# Patient Record
Sex: FEMALE | Race: WHITE | Employment: OTHER | URBAN - METROPOLITAN AREA
[De-identification: names, ages, dates, MRNs, and addresses within clinical notes are randomized per-mention and may not be internally consistent; named-entity substitution may affect disease eponyms.]

---

## 2020-04-29 ENCOUNTER — HOSPITAL ENCOUNTER (OUTPATIENT)
Age: 85
Discharge: HOME | End: 2020-04-29
Payer: MEDICARE

## 2020-04-29 PROCEDURE — U0002 COVID-19 LAB TEST NON-CDC: HCPCS

## 2020-05-02 LAB — SARS-COV-2, NAA: DETECTED

## 2020-06-19 ENCOUNTER — HOSPITAL ENCOUNTER (EMERGENCY)
Age: 85
Discharge: HOME | End: 2020-06-20
Attending: EMERGENCY MEDICINE | Admitting: SURGERY
Payer: MEDICARE

## 2020-06-19 ENCOUNTER — APPOINTMENT (OUTPATIENT)
Dept: CT IMAGING | Age: 85
End: 2020-06-19
Payer: MEDICARE

## 2020-06-19 PROCEDURE — 83874 ASSAY OF MYOGLOBIN: CPT

## 2020-06-19 PROCEDURE — 84484 ASSAY OF TROPONIN QUANT: CPT

## 2020-06-19 PROCEDURE — 93005 ELECTROCARDIOGRAM TRACING: CPT

## 2020-06-19 PROCEDURE — 80048 BASIC METABOLIC PNL TOTAL CA: CPT

## 2020-06-19 PROCEDURE — 87086 URINE CULTURE/COLONY COUNT: CPT

## 2020-06-19 PROCEDURE — 36415 COLL VENOUS BLD VENIPUNCTURE: CPT

## 2020-06-19 PROCEDURE — 12011 RPR F/E/E/N/L/M 2.5 CM/<: CPT

## 2020-06-19 PROCEDURE — 87077 CULTURE AEROBIC IDENTIFY: CPT

## 2020-06-19 PROCEDURE — 81001 URINALYSIS AUTO W/SCOPE: CPT

## 2020-06-19 PROCEDURE — 99285 EMERGENCY DEPT VISIT HI MDM: CPT

## 2020-06-19 PROCEDURE — 70450 CT HEAD/BRAIN W/O DYE: CPT

## 2020-06-19 PROCEDURE — 87186 SC STD MICRODIL/AGAR DIL: CPT

## 2020-06-19 PROCEDURE — 85025 COMPLETE CBC W/AUTO DIFF WBC: CPT

## 2020-06-19 RX ORDER — SENNA PLUS 8.6 MG/1
1 TABLET ORAL DAILY
COMMUNITY

## 2020-06-19 RX ORDER — POLYETHYLENE GLYCOL 3350 17 G/17G
17 POWDER, FOR SOLUTION ORAL DAILY
COMMUNITY

## 2020-06-19 RX ORDER — ACETAMINOPHEN 325 MG/1
650 TABLET ORAL EVERY 6 HOURS PRN
COMMUNITY

## 2020-06-19 RX ORDER — BRIMONIDINE TARTRATE, TIMOLOL MALEATE 2; 5 MG/ML; MG/ML
1 SOLUTION/ DROPS OPHTHALMIC EVERY 12 HOURS
COMMUNITY

## 2020-06-19 RX ORDER — TRAZODONE HYDROCHLORIDE 100 MG/1
100 TABLET ORAL NIGHTLY
COMMUNITY

## 2020-06-19 RX ORDER — LISINOPRIL 20 MG/1
20 TABLET ORAL DAILY
COMMUNITY

## 2020-06-19 RX ORDER — AMLODIPINE BESYLATE 5 MG/1
5 TABLET ORAL DAILY
COMMUNITY

## 2020-06-19 RX ORDER — ALBUTEROL SULFATE 2.5 MG/3ML
2.5 SOLUTION RESPIRATORY (INHALATION) 3 TIMES DAILY
COMMUNITY

## 2020-06-19 RX ORDER — ALPRAZOLAM 0.25 MG/1
0.25 TABLET ORAL 3 TIMES DAILY PRN
COMMUNITY

## 2020-06-19 RX ORDER — KETOTIFEN FUMARATE 0.35 MG/ML
1 SOLUTION/ DROPS OPHTHALMIC 2 TIMES DAILY
COMMUNITY

## 2020-06-19 RX ORDER — TRIMETHOPRIM 100 MG/1
100 TABLET ORAL DAILY
COMMUNITY

## 2020-06-19 ASSESSMENT — PAIN SCALES - GENERAL: PAINLEVEL_OUTOF10: 3

## 2020-06-19 ASSESSMENT — PAIN DESCRIPTION - PAIN TYPE: TYPE: ACUTE PAIN

## 2020-06-19 ASSESSMENT — PAIN DESCRIPTION - LOCATION: LOCATION: FACE

## 2020-06-20 ENCOUNTER — APPOINTMENT (OUTPATIENT)
Dept: GENERAL RADIOLOGY | Age: 85
End: 2020-06-20
Payer: MEDICARE

## 2020-06-20 ENCOUNTER — APPOINTMENT (OUTPATIENT)
Dept: CT IMAGING | Age: 85
End: 2020-06-20
Payer: MEDICARE

## 2020-06-20 VITALS
BODY MASS INDEX: 19.88 KG/M2 | HEART RATE: 86 BPM | WEIGHT: 105.3 LBS | HEIGHT: 61 IN | OXYGEN SATURATION: 100 % | TEMPERATURE: 97.7 F | RESPIRATION RATE: 15 BRPM | SYSTOLIC BLOOD PRESSURE: 113 MMHG | DIASTOLIC BLOOD PRESSURE: 62 MMHG

## 2020-06-20 PROBLEM — I60.9 SAH (SUBARACHNOID HEMORRHAGE) (HCC): Status: ACTIVE | Noted: 2020-06-20

## 2020-06-20 LAB
-: ABNORMAL
ABO/RH: NORMAL
ABSOLUTE EOS #: 0.1 K/UL (ref 0–0.4)
ABSOLUTE IMMATURE GRANULOCYTE: ABNORMAL K/UL (ref 0–0.3)
ABSOLUTE LYMPH #: 2.4 K/UL (ref 1–4.8)
ABSOLUTE MONO #: 0.5 K/UL (ref 0.1–1.2)
ALLEN TEST: ABNORMAL
AMORPHOUS: ABNORMAL
ANION GAP SERPL CALCULATED.3IONS-SCNC: 10 MMOL/L (ref 9–17)
ANION GAP SERPL CALCULATED.3IONS-SCNC: 17 MMOL/L (ref 9–17)
ANTIBODY SCREEN: NEGATIVE
ARM BAND NUMBER: NORMAL
BACTERIA: ABNORMAL
BASOPHILS # BLD: 1 % (ref 0–2)
BASOPHILS ABSOLUTE: 0.1 K/UL (ref 0–0.2)
BILIRUBIN URINE: NEGATIVE
BLOOD BANK SPECIMEN: ABNORMAL
BUN BLDV-MCNC: 16 MG/DL (ref 8–23)
BUN BLDV-MCNC: 20 MG/DL (ref 8–23)
BUN/CREAT BLD: ABNORMAL (ref 9–20)
CALCIUM SERPL-MCNC: 9.3 MG/DL (ref 8.6–10.4)
CARBOXYHEMOGLOBIN: 0.4 % (ref 0–5)
CASTS UA: ABNORMAL /LPF
CHLORIDE BLD-SCNC: 106 MMOL/L (ref 98–107)
CHLORIDE BLD-SCNC: 95 MMOL/L (ref 98–107)
CHP ED QC CHECK: NORMAL
CHP ED QC CHECK: YES
CO2: 21 MMOL/L (ref 20–31)
CO2: 25 MMOL/L (ref 20–31)
COLOR: YELLOW
COMMENT UA: ABNORMAL
CREAT SERPL-MCNC: 1.07 MG/DL (ref 0.5–0.9)
CREAT SERPL-MCNC: 1.23 MG/DL (ref 0.5–0.9)
CRYSTALS, UA: ABNORMAL /HPF
DIFFERENTIAL TYPE: ABNORMAL
EOSINOPHILS RELATIVE PERCENT: 2 % (ref 1–4)
EPITHELIAL CELLS UA: ABNORMAL /HPF (ref 0–5)
ETHANOL PERCENT: <0.01 %
ETHANOL: <10 MG/DL
EXPIRATION DATE: NORMAL
FIO2: ABNORMAL
GFR AFRICAN AMERICAN: 49 ML/MIN
GFR AFRICAN AMERICAN: 58 ML/MIN
GFR NON-AFRICAN AMERICAN: 41 ML/MIN
GFR NON-AFRICAN AMERICAN: 48 ML/MIN
GFR SERPL CREATININE-BSD FRML MDRD: ABNORMAL ML/MIN/{1.73_M2}
GLUCOSE BLD-MCNC: 117 MG/DL (ref 70–99)
GLUCOSE BLD-MCNC: 119 MG/DL (ref 65–105)
GLUCOSE BLD-MCNC: 22 MG/DL
GLUCOSE BLD-MCNC: 22 MG/DL (ref 65–105)
GLUCOSE BLD-MCNC: 222 MG/DL
GLUCOSE BLD-MCNC: 222 MG/DL (ref 65–105)
GLUCOSE BLD-MCNC: 427 MG/DL (ref 70–99)
GLUCOSE URINE: NEGATIVE
HCG QUALITATIVE: NEGATIVE
HCO3 VENOUS: 22.8 MMOL/L (ref 24–30)
HCT VFR BLD CALC: 31.4 % (ref 36–46)
HCT VFR BLD CALC: 33.4 % (ref 36.3–47.1)
HEMOGLOBIN: 10.6 G/DL (ref 11.9–15.1)
HEMOGLOBIN: 10.7 G/DL (ref 12–16)
IMMATURE GRANULOCYTES: ABNORMAL %
INR BLD: 1
KETONES, URINE: NEGATIVE
LEUKOCYTE ESTERASE, URINE: ABNORMAL
LYMPHOCYTES # BLD: 44 % (ref 24–44)
MCH RBC QN AUTO: 31.2 PG (ref 25.2–33.5)
MCH RBC QN AUTO: 32.4 PG (ref 26–34)
MCHC RBC AUTO-ENTMCNC: 31.7 G/DL (ref 28.4–34.8)
MCHC RBC AUTO-ENTMCNC: 34.1 G/DL (ref 31–37)
MCV RBC AUTO: 95.2 FL (ref 80–100)
MCV RBC AUTO: 98.2 FL (ref 82.6–102.9)
METHEMOGLOBIN: ABNORMAL % (ref 0–1.5)
MODE: ABNORMAL
MONOCYTES # BLD: 9 % (ref 2–11)
MUCUS: ABNORMAL
MYOGLOBIN: 140 NG/ML (ref 25–58)
NEGATIVE BASE EXCESS, VEN: 1.4 MMOL/L (ref 0–2)
NITRITE, URINE: NEGATIVE
NOTIFICATION TIME: ABNORMAL
NOTIFICATION: ABNORMAL
NRBC AUTOMATED: 0 PER 100 WBC
NRBC AUTOMATED: ABNORMAL PER 100 WBC
O2 DEVICE/FLOW/%: ABNORMAL
O2 SAT, VEN: 84.5 % (ref 60–85)
OTHER OBSERVATIONS UA: ABNORMAL
OXYHEMOGLOBIN: ABNORMAL % (ref 95–98)
PARTIAL THROMBOPLASTIN TIME: 22 SEC (ref 20.5–30.5)
PATIENT TEMP: 37
PCO2, VEN, TEMP ADJ: ABNORMAL MMHG (ref 39–55)
PCO2, VEN: 38.7 (ref 39–55)
PDW BLD-RTO: 13.5 % (ref 11.8–14.4)
PDW BLD-RTO: 14.7 % (ref 12.5–15.4)
PEEP/CPAP: ABNORMAL
PH UA: 5.5 (ref 5–8)
PH VENOUS: 7.39 (ref 7.32–7.42)
PH, VEN, TEMP ADJ: ABNORMAL (ref 7.32–7.42)
PLATELET # BLD: 267 K/UL (ref 138–453)
PLATELET # BLD: 281 K/UL (ref 140–450)
PLATELET ESTIMATE: ABNORMAL
PMV BLD AUTO: 7.3 FL (ref 6–12)
PMV BLD AUTO: 9 FL (ref 8.1–13.5)
PO2, VEN, TEMP ADJ: ABNORMAL MMHG (ref 30–50)
PO2, VEN: 51.9 (ref 30–50)
POSITIVE BASE EXCESS, VEN: ABNORMAL MMOL/L (ref 0–2)
POTASSIUM SERPL-SCNC: 4 MMOL/L (ref 3.7–5.3)
POTASSIUM SERPL-SCNC: 4.7 MMOL/L (ref 3.7–5.3)
PROTEIN UA: NEGATIVE
PROTHROMBIN TIME: 10 SEC (ref 9–12)
PSV: ABNORMAL
PT. POSITION: ABNORMAL
RBC # BLD: 3.3 M/UL (ref 4–5.2)
RBC # BLD: 3.4 M/UL (ref 3.95–5.11)
RBC # BLD: ABNORMAL 10*6/UL
RBC UA: ABNORMAL /HPF (ref 0–2)
RENAL EPITHELIAL, UA: ABNORMAL /HPF
RESPIRATORY RATE: ABNORMAL
SAMPLE SITE: ABNORMAL
SARS-COV-2, PCR: ABNORMAL
SARS-COV-2, RAPID: DETECTED
SARS-COV-2: ABNORMAL
SEG NEUTROPHILS: 44 % (ref 36–66)
SEGMENTED NEUTROPHILS ABSOLUTE COUNT: 2.4 K/UL (ref 1.8–7.7)
SET RATE: ABNORMAL
SODIUM BLD-SCNC: 133 MMOL/L (ref 135–144)
SODIUM BLD-SCNC: 141 MMOL/L (ref 135–144)
SOURCE: ABNORMAL
SPECIFIC GRAVITY UA: 1.01 (ref 1–1.03)
TEXT FOR RESPIRATORY: ABNORMAL
TOTAL HB: ABNORMAL G/DL (ref 12–16)
TOTAL RATE: ABNORMAL
TRICHOMONAS: ABNORMAL
TROPONIN INTERP: ABNORMAL
TROPONIN T: ABNORMAL NG/ML
TROPONIN, HIGH SENSITIVITY: 53 NG/L (ref 0–14)
TURBIDITY: ABNORMAL
URINE HGB: ABNORMAL
UROBILINOGEN, URINE: NORMAL
VT: ABNORMAL
WBC # BLD: 10.5 K/UL (ref 3.5–11.3)
WBC # BLD: 5.4 K/UL (ref 3.5–11)
WBC # BLD: ABNORMAL 10*3/UL
WBC UA: ABNORMAL /HPF (ref 0–5)
YEAST: ABNORMAL

## 2020-06-20 PROCEDURE — 71045 X-RAY EXAM CHEST 1 VIEW: CPT

## 2020-06-20 PROCEDURE — 6370000000 HC RX 637 (ALT 250 FOR IP): Performed by: STUDENT IN AN ORGANIZED HEALTH CARE EDUCATION/TRAINING PROGRAM

## 2020-06-20 PROCEDURE — 72170 X-RAY EXAM OF PELVIS: CPT

## 2020-06-20 PROCEDURE — 73562 X-RAY EXAM OF KNEE 3: CPT

## 2020-06-20 PROCEDURE — 70450 CT HEAD/BRAIN W/O DYE: CPT

## 2020-06-20 PROCEDURE — 96375 TX/PRO/DX INJ NEW DRUG ADDON: CPT

## 2020-06-20 PROCEDURE — 73502 X-RAY EXAM HIP UNI 2-3 VIEWS: CPT

## 2020-06-20 PROCEDURE — 86901 BLOOD TYPING SEROLOGIC RH(D): CPT

## 2020-06-20 PROCEDURE — G0480 DRUG TEST DEF 1-7 CLASSES: HCPCS

## 2020-06-20 PROCEDURE — 2580000003 HC RX 258: Performed by: EMERGENCY MEDICINE

## 2020-06-20 PROCEDURE — 85610 PROTHROMBIN TIME: CPT

## 2020-06-20 PROCEDURE — 82947 ASSAY GLUCOSE BLOOD QUANT: CPT

## 2020-06-20 PROCEDURE — 72128 CT CHEST SPINE W/O DYE: CPT

## 2020-06-20 PROCEDURE — 80051 ELECTROLYTE PANEL: CPT

## 2020-06-20 PROCEDURE — 96365 THER/PROPH/DIAG IV INF INIT: CPT

## 2020-06-20 PROCEDURE — 96372 THER/PROPH/DIAG INJ SC/IM: CPT

## 2020-06-20 PROCEDURE — 70486 CT MAXILLOFACIAL W/O DYE: CPT

## 2020-06-20 PROCEDURE — 85027 COMPLETE CBC AUTOMATED: CPT

## 2020-06-20 PROCEDURE — 99222 1ST HOSP IP/OBS MODERATE 55: CPT | Performed by: NEUROLOGICAL SURGERY

## 2020-06-20 PROCEDURE — U0002 COVID-19 LAB TEST NON-CDC: HCPCS

## 2020-06-20 PROCEDURE — 6370000000 HC RX 637 (ALT 250 FOR IP): Performed by: EMERGENCY MEDICINE

## 2020-06-20 PROCEDURE — 72125 CT NECK SPINE W/O DYE: CPT

## 2020-06-20 PROCEDURE — 86900 BLOOD TYPING SEROLOGIC ABO: CPT

## 2020-06-20 PROCEDURE — 6360000002 HC RX W HCPCS: Performed by: EMERGENCY MEDICINE

## 2020-06-20 PROCEDURE — 82805 BLOOD GASES W/O2 SATURATION: CPT

## 2020-06-20 PROCEDURE — 84703 CHORIONIC GONADOTROPIN ASSAY: CPT

## 2020-06-20 PROCEDURE — 2580000003 HC RX 258

## 2020-06-20 PROCEDURE — 72131 CT LUMBAR SPINE W/O DYE: CPT

## 2020-06-20 PROCEDURE — 2580000003 HC RX 258: Performed by: STUDENT IN AN ORGANIZED HEALTH CARE EDUCATION/TRAINING PROGRAM

## 2020-06-20 PROCEDURE — 84520 ASSAY OF UREA NITROGEN: CPT

## 2020-06-20 PROCEDURE — 85730 THROMBOPLASTIN TIME PARTIAL: CPT

## 2020-06-20 PROCEDURE — 86850 RBC ANTIBODY SCREEN: CPT

## 2020-06-20 PROCEDURE — 74176 CT ABD & PELVIS W/O CONTRAST: CPT

## 2020-06-20 PROCEDURE — 82565 ASSAY OF CREATININE: CPT

## 2020-06-20 RX ORDER — TRAZODONE HYDROCHLORIDE 100 MG/1
100 TABLET ORAL NIGHTLY
Status: DISCONTINUED | OUTPATIENT
Start: 2020-06-20 | End: 2020-06-20 | Stop reason: HOSPADM

## 2020-06-20 RX ORDER — LORAZEPAM 2 MG/ML
0.5 INJECTION INTRAMUSCULAR ONCE
Status: COMPLETED | OUTPATIENT
Start: 2020-06-20 | End: 2020-06-20

## 2020-06-20 RX ORDER — METOPROLOL TARTRATE 50 MG/1
25 TABLET, FILM COATED ORAL 2 TIMES DAILY
Status: DISCONTINUED | OUTPATIENT
Start: 2020-06-20 | End: 2020-06-20 | Stop reason: HOSPADM

## 2020-06-20 RX ORDER — ONDANSETRON 2 MG/ML
4 INJECTION INTRAMUSCULAR; INTRAVENOUS EVERY 6 HOURS PRN
Status: DISCONTINUED | OUTPATIENT
Start: 2020-06-20 | End: 2020-06-20 | Stop reason: HOSPADM

## 2020-06-20 RX ORDER — ZIPRASIDONE MESYLATE 20 MG/ML
10 INJECTION, POWDER, LYOPHILIZED, FOR SOLUTION INTRAMUSCULAR ONCE
Status: COMPLETED | OUTPATIENT
Start: 2020-06-20 | End: 2020-06-20

## 2020-06-20 RX ORDER — ACETAMINOPHEN 325 MG/1
650 TABLET ORAL EVERY 8 HOURS SCHEDULED
Status: DISCONTINUED | OUTPATIENT
Start: 2020-06-20 | End: 2020-06-20 | Stop reason: HOSPADM

## 2020-06-20 RX ORDER — LORAZEPAM 2 MG/ML
0.5 INJECTION INTRAMUSCULAR ONCE
Status: DISCONTINUED | OUTPATIENT
Start: 2020-06-20 | End: 2020-06-20

## 2020-06-20 RX ORDER — PROMETHAZINE HYDROCHLORIDE 25 MG/1
12.5 TABLET ORAL EVERY 6 HOURS PRN
Status: DISCONTINUED | OUTPATIENT
Start: 2020-06-20 | End: 2020-06-20 | Stop reason: HOSPADM

## 2020-06-20 RX ORDER — ALBUTEROL SULFATE 90 UG/1
2 AEROSOL, METERED RESPIRATORY (INHALATION) EVERY 6 HOURS
Status: DISCONTINUED | OUTPATIENT
Start: 2020-06-20 | End: 2020-06-20 | Stop reason: HOSPADM

## 2020-06-20 RX ORDER — FENTANYL CITRATE 50 UG/ML
50 INJECTION, SOLUTION INTRAMUSCULAR; INTRAVENOUS ONCE
Status: DISCONTINUED | OUTPATIENT
Start: 2020-06-20 | End: 2020-06-20 | Stop reason: HOSPADM

## 2020-06-20 RX ORDER — SODIUM CHLORIDE 0.9 % (FLUSH) 0.9 %
10 SYRINGE (ML) INJECTION EVERY 12 HOURS SCHEDULED
Status: DISCONTINUED | OUTPATIENT
Start: 2020-06-20 | End: 2020-06-20 | Stop reason: HOSPADM

## 2020-06-20 RX ORDER — POLYETHYLENE GLYCOL 3350 17 G/17G
17 POWDER, FOR SOLUTION ORAL DAILY PRN
Status: DISCONTINUED | OUTPATIENT
Start: 2020-06-20 | End: 2020-06-20 | Stop reason: HOSPADM

## 2020-06-20 RX ORDER — DEXTROSE MONOHYDRATE 25 G/50ML
50 INJECTION, SOLUTION INTRAVENOUS ONCE
Status: COMPLETED | OUTPATIENT
Start: 2020-06-20 | End: 2020-06-20

## 2020-06-20 RX ORDER — ALBUTEROL SULFATE 2.5 MG/3ML
2.5 SOLUTION RESPIRATORY (INHALATION) 3 TIMES DAILY
Status: DISCONTINUED | OUTPATIENT
Start: 2020-06-20 | End: 2020-06-20

## 2020-06-20 RX ORDER — LISINOPRIL 10 MG/1
20 TABLET ORAL DAILY
Status: DISCONTINUED | OUTPATIENT
Start: 2020-06-20 | End: 2020-06-20 | Stop reason: HOSPADM

## 2020-06-20 RX ORDER — METHOCARBAMOL 500 MG/1
750 TABLET, FILM COATED ORAL 3 TIMES DAILY
Status: DISCONTINUED | OUTPATIENT
Start: 2020-06-20 | End: 2020-06-20 | Stop reason: HOSPADM

## 2020-06-20 RX ORDER — SODIUM CHLORIDE 0.9 % (FLUSH) 0.9 %
10 SYRINGE (ML) INJECTION PRN
Status: DISCONTINUED | OUTPATIENT
Start: 2020-06-20 | End: 2020-06-20 | Stop reason: HOSPADM

## 2020-06-20 RX ORDER — LORAZEPAM 2 MG/ML
0.25 INJECTION INTRAMUSCULAR ONCE
Status: COMPLETED | OUTPATIENT
Start: 2020-06-20 | End: 2020-06-20

## 2020-06-20 RX ORDER — SODIUM CHLORIDE 9 MG/ML
INJECTION, SOLUTION INTRAVENOUS CONTINUOUS
Status: DISCONTINUED | OUTPATIENT
Start: 2020-06-20 | End: 2020-06-20 | Stop reason: HOSPADM

## 2020-06-20 RX ORDER — AMLODIPINE BESYLATE 10 MG/1
5 TABLET ORAL DAILY
Status: DISCONTINUED | OUTPATIENT
Start: 2020-06-20 | End: 2020-06-20 | Stop reason: HOSPADM

## 2020-06-20 RX ORDER — LORAZEPAM 2 MG/ML
INJECTION INTRAMUSCULAR
Status: DISCONTINUED
Start: 2020-06-20 | End: 2020-06-20 | Stop reason: HOSPADM

## 2020-06-20 RX ORDER — DEXTROSE MONOHYDRATE 25 G/50ML
INJECTION, SOLUTION INTRAVENOUS
Status: COMPLETED
Start: 2020-06-20 | End: 2020-06-20

## 2020-06-20 RX ORDER — GINSENG 100 MG
CAPSULE ORAL ONCE
Status: COMPLETED | OUTPATIENT
Start: 2020-06-20 | End: 2020-06-20

## 2020-06-20 RX ADMIN — DEXTROSE MONOHYDRATE 50 G: 25 INJECTION, SOLUTION INTRAVENOUS at 12:35

## 2020-06-20 RX ADMIN — AMLODIPINE BESYLATE 5 MG: 10 TABLET ORAL at 09:12

## 2020-06-20 RX ADMIN — LORAZEPAM 0.25 MG: 2 INJECTION INTRAMUSCULAR; INTRAVENOUS at 17:51

## 2020-06-20 RX ADMIN — LORAZEPAM 0.5 MG: 2 INJECTION INTRAMUSCULAR at 18:00

## 2020-06-20 RX ADMIN — BACITRACIN: 500 OINTMENT TOPICAL at 01:46

## 2020-06-20 RX ADMIN — INSULIN LISPRO 10 UNITS: 100 INJECTION, SOLUTION INTRAVENOUS; SUBCUTANEOUS at 10:55

## 2020-06-20 RX ADMIN — SODIUM CHLORIDE: 9 INJECTION, SOLUTION INTRAVENOUS at 09:17

## 2020-06-20 RX ADMIN — METOPROLOL TARTRATE 25 MG: 50 TABLET, FILM COATED ORAL at 09:12

## 2020-06-20 RX ADMIN — ZIPRASIDONE MESYLATE 10 MG: 20 INJECTION, POWDER, LYOPHILIZED, FOR SOLUTION INTRAMUSCULAR at 18:22

## 2020-06-20 RX ADMIN — LISINOPRIL 20 MG: 10 TABLET ORAL at 09:13

## 2020-06-20 RX ADMIN — METHOCARBAMOL TABLETS 750 MG: 500 TABLET, COATED ORAL at 10:54

## 2020-06-20 RX ADMIN — SERTRALINE 50 MG: 50 TABLET, FILM COATED ORAL at 09:12

## 2020-06-20 RX ADMIN — CEFTRIAXONE SODIUM 1 G: 1 INJECTION, POWDER, FOR SOLUTION INTRAMUSCULAR; INTRAVENOUS at 01:14

## 2020-06-20 RX ADMIN — WATER 1.2 ML: 1 INJECTION INTRAMUSCULAR; INTRAVENOUS; SUBCUTANEOUS at 18:23

## 2020-06-20 RX ADMIN — ACETAMINOPHEN 650 MG: 325 TABLET ORAL at 09:16

## 2020-06-20 ASSESSMENT — ENCOUNTER SYMPTOMS
EYE REDNESS: 0
CHEST TIGHTNESS: 0
ABDOMINAL PAIN: 0
EYE DISCHARGE: 0
FACIAL SWELLING: 0
SHORTNESS OF BREATH: 0
ABDOMINAL DISTENTION: 0

## 2020-06-20 ASSESSMENT — PAIN SCALES - GENERAL
PAINLEVEL_OUTOF10: 8
PAINLEVEL_OUTOF10: 5
PAINLEVEL_OUTOF10: 3

## 2020-06-20 ASSESSMENT — PAIN DESCRIPTION - PAIN TYPE: TYPE: ACUTE PAIN

## 2020-06-20 ASSESSMENT — PAIN DESCRIPTION - PROGRESSION: CLINICAL_PROGRESSION: GRADUALLY IMPROVING

## 2020-06-20 NOTE — ED NOTES
Report called to [facility], given to nurse Lisa Ruff RN via telephone     Dhiraj Goodwin RN  06/20/20 3721

## 2020-06-20 NOTE — CONSULTS
Department of Neurosurgery                                            Nurse Practitioner Consult Note      Reason for Consult:  fall  Requesting Physician:  Dr. Charlene Bazzi  Neurosurgeon:   [] Dr. Fabian Ortiz  [] Dr. Bernadette Puentes  [] Dr. Lisa Barton  [] Dr. Basim Son      History Obtained From:  patient, electronic medical record    CHIEF COMPLAINT:         fall    HISTORY OF PRESENT ILLNESS:       The patient is a 80 y.o. female who presents with fall at nursing home. States that she tripped and went down on her left knee and face. Currently c/o pain to face, left knee, low back. Denies any loss of consciousness. Not on AC/AP meds. Taken to [facility] ER. CT head revealed acute SAH. C-spine negative. Evaluated by trauma team on arrival.    PAST MEDICAL HISTORY :       Past Medical History:        Diagnosis Date    Alzheimer disease ([facility] [address]     Constipation     Depression     Glaucoma     Hypertension        Past Surgical History:    History reviewed. No pertinent surgical history. Social History:   Social History     Socioeconomic History    Marital status:       Spouse name: Not on file    Number of children: Not on file    Years of education: Not on file    Highest education level: Not on file   Occupational History    Not on file   Social Needs    Financial resource strain: Not on file    Food insecurity     Worry: Not on file     Inability: Not on file    Transportation needs     Medical: Not on file     Non-medical: Not on file   Tobacco Use    Smoking status: Not on file   Substance and Sexual Activity    Alcohol use: Not on file    Drug use: Not on file    Sexual activity: Not on file   Lifestyle    Physical activity     Days per week: Not on file     Minutes per session: Not on file    Stress: Not on file   Relationships    Social connections     Talks on phone: Not on file     Gets together: Not on file     Attends Hindu service: Not on file     Active member of club or organization: Not on intact. The mandibular condyles are normally situated. Degenerate changes bilateral TMJs. Greatest on the left. The nasal bones and maxillary nasal processes are intact. ORBITS: The globes appear intact. The extraocular muscles, optic nerve sheath complexes and lacrimal glands appear unremarkable. No retrobulbar hematoma or mass is seen. The orbital walls and rims are intact. SINUSES/MASTOIDS: The paranasal sinuses and mastoid air cells are well aerated. No acute fracture is seen. SOFT TISSUES: No appreciable facial soft tissue swelling is seen. Mild-to-moderate right frontal subarachnoid hemorrhage. Moderate chronic small ischemic disease. No acute traumatic injury of the facial bones. Critical results were called by Dr. Belgica Shipley to [facility] on 6/20/2020 at 00:53. Ct Cervical Spine Wo Contrast    Result Date: 6/20/2020  EXAMINATION: CT OF THE CERVICAL SPINE WITHOUT CONTRAST 6/19/2020 11:58 pm TECHNIQUE: CT of the cervical spine was performed without the administration of intravenous contrast. Multiplanar reformatted images are provided for review. Dose modulation, iterative reconstruction, and/or weight based adjustment of the mA/kV was utilized to reduce the radiation dose to as low as reasonably achievable. COMPARISON: None. HISTORY: ORDERING SYSTEM PROVIDED HISTORY: trauma TECHNOLOGIST PROVIDED HISTORY: trauma Reason for Exam: fall Acuity: Acute Type of Exam: Initial FINDINGS: BONES/ALIGNMENT: There is no acute fracture or traumatic malalignment. DEGENERATIVE CHANGES: Moderate multilevel degenerate changes most pronounced C4 through C5. SOFT TISSUES: There is no prevertebral soft tissue swelling. Minimal apical scarring versus nodularity     No acute fracture or traumatic malalignment of the cervical spine.      Ct Thoracic Spine Wo Contrast    Result Date: 6/20/2020  EXAMINATION: CT OF THE THORACIC SPINE WITHOUT CONTRAST; CT OF THE LUMBAR SPINE WITHOUT CONTRAST  6/20/2020 2:56 am:

## 2020-06-20 NOTE — FLOWSHEET NOTE
asked to call patient's son: Pham Donnelly .  spoke to Neftali Quintero. Stated he was aware his mother had been transferred to [place]. Fortino Quintero given contact information. Neftali Quintero expressed gratitude. Chaplains will remain available for spiritual and emotional support and may be paged for a specific request and or visit at any time.

## 2020-06-20 NOTE — ED NOTES
Called [facility] to set up transport. Dispatch states crew is en route to facility.  Will be here in 30 minutes or less     Olaf Bai RN  06/20/20 3278

## 2020-06-20 NOTE — ED NOTES
Patient resting on cart, nondistressed  GCS=15  Denies complaints at this time    Will continue to monitor  VSS  Continues to wait for room assignment     Call light within reach     Amanda Hector, [facility]  06/20/20 9160

## 2020-06-20 NOTE — H&P
______________________________________________________    [] Other protective devices used / worn ___________________________    HISTORY:     Edison Nuñez is a 80 y.o. female that presented to the Emergency Department following mechanical fall from standing height. Patient states that she tripped over her slipper at nursing home. Adal Gabi forward hitting her head. No loss of consciousness. Not on any anticoagulation. Suffered left frontal hematoma. Transferred to [facility] emergency department where she was found to have acute subarachnoid hemorrhage. Also suffered laceration over her chin that was repaired at [facility]. CT scan of C-spine negative for acute fracture at [facility]. Transferred for further evaluation by neurosurgery team.    No complaints of pain upon arrival.    Loss of Consciousness [x]No   []Yes Duration(min)       [] Unknown     Total Fluids Given Prior To Arrival  cc    MEDICATIONS:   []  None     []  Information not available due to exam limitations documented above  Prior to Admission medications    Medication Sig Start Date End Date Taking?  Authorizing Provider   ketotifen (ALAWAY) 0.025 % ophthalmic solution 1 drop 2 times daily   Yes Historical Provider, MD   brimonidine-timolol (COMBIGAN) 0.2-0.5 % ophthalmic solution Place 1 drop into both eyes every 12 hours   Yes Historical Provider, MD   metoprolol tartrate (LOPRESSOR) 25 MG tablet Take 25 mg by mouth 2 times daily   Yes Historical Provider, MD   polyethyl glycol-propyl glycol 0.4-0.3 % (SYSTANE) 0.4-0.3 % ophthalmic solution Place 2 drops into both eyes every 4 hours While awake   Yes Historical Provider, MD   acetaminophen (TYLENOL) 325 MG tablet Take 650 mg by mouth every 6 hours as needed for Pain   Yes Historical Provider, MD   albuterol (PROVENTIL) (2.5 MG/3ML) 0.083% nebulizer solution Take 2.5 mg by nebulization three times daily   Yes Historical Provider, MD   ALPRAZolam (XANAX) 0.25 MG tablet fall Acuity: Acute Type of Exam: Initial FINDINGS: CT HEAD: BRAIN/VENTRICLES: Mild-to-moderate right frontal subarachnoid hemorrhage. Moderate patchy periventricular subcortical white matter hypoattenuation suggestive of chronic small vessel ischemic disease. .  The gray-white differentiation is maintained without evidence of an acute infarct. There is no evidence of hydrocephalus. ORBITS: The visualized portion of the orbits demonstrate no acute abnormality. SINUSES: Mild ethmoid sinus mucosal thickening. Mastoids are clear. SOFT TISSUES/SKULL:  No acute abnormality of the visualized skull or soft tissues. CT FACIAL BONES: FACIAL BONES: The maxilla, pterygoid plates and zygomatic arches are intact. The mandible is intact. The mandibular condyles are normally situated. Degenerate changes bilateral TMJs. Greatest on the left. The nasal bones and maxillary nasal processes are intact. ORBITS: The globes appear intact. The extraocular muscles, optic nerve sheath complexes and lacrimal glands appear unremarkable. No retrobulbar hematoma or mass is seen. The orbital walls and rims are intact. SINUSES/MASTOIDS: The paranasal sinuses and mastoid air cells are well aerated. No acute fracture is seen. SOFT TISSUES: No appreciable facial soft tissue swelling is seen. Mild-to-moderate right frontal subarachnoid hemorrhage. Moderate chronic small ischemic disease. No acute traumatic injury of the facial bones. Critical results were called by Dr. Santiago Lopez to [facility] on 6/20/2020 at 00:53.      Ct Facial Bones Wo Contrast    Result Date: 6/20/2020  EXAMINATION: CT OF THE HEAD WITHOUT CONTRAST; CT OF THE FACE WITHOUT CONTRAST  6/19/2020 11:57 pm TECHNIQUE: CT of the head was performed without the administration of intravenous contrast. Dose modulation, iterative reconstruction, and/or weight based adjustment of the mA/kV was utilized to reduce the radiation dose to as low as reasonably achievable.; CT of the acute traumatic injury of the facial bones. Critical results were called by Dr. Theresa Keith to [facility] on 6/20/2020 at 00:53. Ct Cervical Spine Wo Contrast    Result Date: 6/20/2020  EXAMINATION: CT OF THE CERVICAL SPINE WITHOUT CONTRAST 6/19/2020 11:58 pm TECHNIQUE: CT of the cervical spine was performed without the administration of intravenous contrast. Multiplanar reformatted images are provided for review. Dose modulation, iterative reconstruction, and/or weight based adjustment of the mA/kV was utilized to reduce the radiation dose to as low as reasonably achievable. COMPARISON: None. HISTORY: ORDERING SYSTEM PROVIDED HISTORY: trauma TECHNOLOGIST PROVIDED HISTORY: trauma Reason for Exam: fall Acuity: Acute Type of Exam: Initial FINDINGS: BONES/ALIGNMENT: There is no acute fracture or traumatic malalignment. DEGENERATIVE CHANGES: Moderate multilevel degenerate changes most pronounced C4 through C5. SOFT TISSUES: There is no prevertebral soft tissue swelling. Minimal apical scarring versus nodularity     No acute fracture or traumatic malalignment of the cervical spine. Xr Chest Portable    Result Date: 6/20/2020  EXAMINATION: ONE XRAY VIEW OF THE CHEST 6/19/2020 11:56 pm COMPARISON: None. HISTORY: ORDERING SYSTEM PROVIDED HISTORY: trauma TECHNOLOGIST PROVIDED HISTORY: trauma Reason for Exam: fall Acuity: Acute Type of Exam: Initial FINDINGS: Chronic pulmonary changes. No consolidation, effusion, or pneumothorax. The heart is not enlarged. Degenerative changes of the skeleton. Chronic changes without focal airspace consolidation.        LABS    Labs Reviewed   CBC WITH AUTO DIFFERENTIAL - Abnormal; Notable for the following components:       Result Value    RBC 3.30 (*)     Hemoglobin 10.7 (*)     Hematocrit 31.4 (*)     All other components within normal limits   BASIC METABOLIC PANEL - Abnormal; Notable for the following components:    Glucose 117 (*)     CREATININE 1.23 (*)     GFR Non- 41 (*)     GFR  49 (*)     All other components within normal limits   TROP/MYOGLOBIN - Abnormal; Notable for the following components:    Troponin, High Sensitivity 53 (*)     Myoglobin 140 (*)     All other components within normal limits   URINE RT REFLEX TO CULTURE - Abnormal; Notable for the following components:    Turbidity UA SLIGHTLY CLOUDY (*)     Urine Hgb SMALL (*)     Leukocyte Esterase, Urine SMALL (*)     All other components within normal limits   MICROSCOPIC URINALYSIS - Abnormal; Notable for the following components:    Bacteria, UA MANY (*)     Other Observations UA Culture ordered based on defined criteria.  (*)     All other components within normal limits   CULTURE, URINE         Saud Morrow DO  6/20/20, 2:53 AM

## 2020-06-20 NOTE — ED PROVIDER NOTES
[address] [facility] ED  eMERGENCY dEPARTMENT eNCOUnter      Pt Name: Yoanna Gonzalez  MRN: [id number]  [place] 5/14/1926  Date of evaluation: 6/19/2020  Provider: Misty Olmedo       CHIEF COMPLAINT       Chief Complaint   Patient presents with    Fall     Pt fell at nursing home just prior to arrival. Pt staying at [facility] unit. Pt does not remember the fall or why she fell. She is unsure of what she hit her head or her face on     Facial Laceration     Pt has 2 cm laceration noted to the chin. Moderate amount of bleeding noted. Clean dressing placed over wound    Knee Pain     left knee         HISTORY OF PRESENT ILLNESS  (Location/Symptom, Timing/Onset, Context/Setting, Quality, Duration, Modifying Factors, Severity.)   Yoanna Gonzalez is a 80 y.o. female who presents to the emergency department patient fell while walking out of her bathroom. It was unknown if it was witnessed or not. This could be possible syncope. The patient denies any pain other than some pain in her left knee. She doesn't remember falling. She is now alert. She does stay at a unit for memory loss. Nursing Notes werereviewed. REVIEW OF SYSTEMS    (2-9 systems for level 4, 10 or more for level 5)     Review of Systems   Constitutional: Negative for chills and fatigue. HENT: Negative for facial swelling. Eyes: Negative for discharge and redness. Respiratory: Negative for chest tightness and shortness of breath. Cardiovascular: Negative for chest pain and palpitations. Gastrointestinal: Negative for abdominal distention and abdominal pain. Genitourinary: Negative for dysuria and hematuria. Musculoskeletal: Negative for gait problem, joint swelling and neck pain. Skin: Positive for wound. Negative for pallor and rash. Trauma, bruising   Neurological: Negative for speech difficulty and headaches. All other systems reviewed and are negative.      Except as noted above the remainder of the review of systems was reviewed and negative. PAST MEDICAL HISTORY   History reviewed. No pertinent past medical history. SURGICAL HISTORY     History reviewed. No pertinent surgical history. CURRENT MEDICATIONS       Previous Medications    ACETAMINOPHEN (TYLENOL) 325 MG TABLET    Take 650 mg by mouth every 6 hours as needed for Pain    ALBUTEROL (PROVENTIL) (2.5 MG/3ML) 0.083% NEBULIZER SOLUTION    Take 2.5 mg by nebulization three times daily    ALPRAZOLAM (XANAX) 0.25 MG TABLET    Take 0.25 mg by mouth 3 times daily as needed for Sleep. AMLODIPINE (NORVASC) 5 MG TABLET    Take 5 mg by mouth daily    BRIMONIDINE-TIMOLOL (COMBIGAN) 0.2-0.5 % OPHTHALMIC SOLUTION    Place 1 drop into both eyes every 12 hours    DOCUSATE (COLACE) 50 MG/5ML LIQUID    Take 50 mg by mouth daily    KETOTIFEN (ALAWAY) 0.025 % OPHTHALMIC SOLUTION    1 drop 2 times daily    LISINOPRIL (PRINIVIL;ZESTRIL) 20 MG TABLET    Take 20 mg by mouth daily    METOPROLOL TARTRATE (LOPRESSOR) 25 MG TABLET    Take 25 mg by mouth 2 times daily    POLYETHYL GLYCOL-PROPYL GLYCOL 0.4-0.3 % (SYSTANE) 0.4-0.3 % OPHTHALMIC SOLUTION    Place 2 drops into both eyes every 4 hours While awake    POLYETHYLENE GLYCOL (GLYCOLAX) 17 GM/SCOOP POWDER    Take 17 g by mouth daily    SENNA (SENOKOT) 8.6 MG TABLET    Take 1 tablet by mouth daily    SERTRALINE (ZOLOFT) 50 MG TABLET    Take 50 mg by mouth daily    TRAVOPROST, BENZALKONIUM, (TRAVATAN) 0.004 % OPHTHALMIC SOLUTION    1 drop nightly    TRAZODONE (DESYREL) 100 MG TABLET    Take 100 mg by mouth nightly    TRIMETHOPRIM (TRIMPEX) 100 MG TABLET    Take 100 mg by mouth daily       ALLERGIES     Sulfa antibiotics    FAMILY HISTORY     History reviewed. No pertinent family history. SOCIAL HISTORY       Social History     Socioeconomic History    Marital status:       Spouse name: None    Number of children: None    Years of education: None    Highest education level: None Occupational History    None   Social Needs    Financial resource strain: None    Food insecurity     Worry: None     Inability: None    Transportation needs     Medical: None     Non-medical: None   Tobacco Use    Smoking status: None   Substance and Sexual Activity    Alcohol use: None    Drug use: None    Sexual activity: None   Lifestyle    Physical activity     Days per week: None     Minutes per session: None    Stress: None   Relationships    Social connections     Talks on phone: None     Gets together: None     Attends Baptism service: None     Active member of club or organization: None     Attends meetings of clubs or organizations: None     Relationship status: None    Intimate partner violence     Fear of current or ex partner: None     Emotionally abused: None     Physically abused: None     Forced sexual activity: None   Other Topics Concern    None   Social History Narrative    None         PHYSICAL EXAM    (up to 7 for level 4, 8 or more for level 5)      ED Triage Vitals [06/19/20 2330]   BP Temp Temp Source Pulse Resp SpO2 Height Weight   (!) 194/101 97.7 °F (36.5 °C) Oral 94 16 99 % -- 105 lb 4.8 oz (47.8 kg)       Physical Exam  Vitals signs and nursing note reviewed. HENT:      Head: Normocephalic. Laceration present. Nose: Signs of injury and nasal tenderness present. Right Nostril: No septal hematoma. Left Nostril: No septal hematoma. Eyes:      Conjunctiva/sclera: Conjunctivae normal.   Neck:      Musculoskeletal: Normal range of motion and neck supple. Cardiovascular:      Rate and Rhythm: Normal rate and regular rhythm. Pulmonary:      Effort: Pulmonary effort is normal.      Breath sounds: Normal breath sounds. Abdominal:      General: There is no distension. Palpations: Abdomen is soft. Tenderness: There is no abdominal tenderness. Musculoskeletal: Normal range of motion. Left knee: She exhibits swelling.  She exhibits no disease. No acute traumatic injury of the facial bones. Critical results were called by Dr. Yury Vila to [facility] on   6/20/2020 at 00:53. ED BEDSIDE ULTRASOUND:   Performed by ED Physician - none    LABS:  Labs Reviewed   CBC WITH AUTO DIFFERENTIAL - Abnormal; Notable for the following components:       Result Value    RBC 3.30 (*)     Hemoglobin 10.7 (*)     Hematocrit 31.4 (*)     All other components within normal limits   BASIC METABOLIC PANEL - Abnormal; Notable for the following components:    Glucose 117 (*)     CREATININE 1.23 (*)     GFR Non- 41 (*)     GFR  49 (*)     All other components within normal limits   TROP/MYOGLOBIN - Abnormal; Notable for the following components:    Troponin, High Sensitivity 53 (*)     Myoglobin 140 (*)     All other components within normal limits   URINE RT REFLEX TO CULTURE - Abnormal; Notable for the following components:    Turbidity UA SLIGHTLY CLOUDY (*)     Urine Hgb SMALL (*)     Leukocyte Esterase, Urine SMALL (*)     All other components within normal limits   MICROSCOPIC URINALYSIS - Abnormal; Notable for the following components:    Bacteria, UA MANY (*)     Other Observations UA Culture ordered based on defined criteria. (*)     All other components within normal limits   CULTURE, URINE       All other labs were within normal range or not returned as of this dictation.     EMERGENCY DEPARTMENT COURSE and DIFFERENTIALDIAGNOSIS/MDM:   Vitals:    Vitals:    06/19/20 2330 06/20/20 0047 06/20/20 0122 06/20/20 0139   BP: (!) 194/101 (!) 161/112 (!) 162/72 (!) 170/74   Pulse: 94 82 94 95   Resp: 16 14 19 14   Temp: 97.7 °F (36.5 °C)      TempSrc: Oral      SpO2: 99% 95% 97% 98%   Weight: 47.8 kg (105 lb 4.8 oz)          Lac Repair  Date/Time: 6/20/2020 1:38 AM  Performed by: Danial Mejia DO  Authorized by: Danial Mejia DO     Consent:     Consent obtained:  Verbal    Consent given by: Patient  Anesthesia (see MAR for exact dosages): Anesthesia method:  Local infiltration    Local anesthetic:  Lidocaine 1% w/o epi  Laceration details:     Location:  Face    Face location:  Chin    Length (cm):  2  Repair type:     Repair type:  Simple  Exploration:     Wound exploration: wound explored through full range of motion      Contaminated: no    Treatment:     Area cleansed with:  Saline    Amount of cleaning:  Standard    Irrigation solution:  Sterile saline    Irrigation method:  Pressure wash    Visualized foreign bodies/material removed: no    Skin repair:     Repair method:  Sutures    Suture size:  4-0    Suture material:  Prolene    Suture technique:  Simple interrupted  Approximation:     Approximation:  Close  Post-procedure details:     Dressing:  Antibiotic ointment    Patient tolerance of procedure: Tolerated well, no immediate complications          RE-EVALUATION:    Pt does have a tramatic SAH. I spoke with Dr Christina Chapa for transfer to [facility]    Patient is positive for urinary tract infection. I will give her dose or Rocephin in the ER. The patient is not on any anticoagulants. She does have a elevated troponin however she has subarachnoid hemorrhage will not give aspirin. Pt is doing well. Awaiting transport. CONSULTS:  None    PROCEDURES:  None    FINAL IMPRESSION      1. Fall, initial encounter    2. SAH (subarachnoid hemorrhage) ([facility])    3. Chin laceration, initial encounter    4. Contusion of face, initial encounter          DISPOSITION/PLAN   DISPOSITION Decision To Admit 06/20/2020 01:38:15 AM      CONDITION ON DISPOSITION:  Stable     PATIENT REFERRED TO:  No follow-up provider specified. DISCHARGE MEDICATIONS:  [unfilled]    (Please note that portions of thisnote were completed with a voice recognition program.  Efforts were made to edit the dictations but occasionally words are mis-transcribed.)    Maxwell Villafana D.O., F.A.C.E.P.   Attending

## 2020-06-20 NOTE — ED NOTES
Spoke with Sandrine Fisher about patient returning to [place]. Per SW, the facility did not know the patient was covid positive, patient tested positive back in April. Pt may have to go to another facility due to being covid positive. The patient will stay in ED until she can be fitted for orthotic. [facility] transportation arranged for after 1900.      Jessica Wyatt RN  06/20/20 0695

## 2020-06-20 NOTE — ED NOTES
[facility].  Dr. Nola Mauricio on phone with ED attending at [id number] [place] Nissa RN  06/20/20 3104

## 2020-06-20 NOTE — ED NOTES
Dr. Daly Pluck in room for suturing.  6 sutures placed to chin, pt tolerates without any difficulty     Chandler Coronado RN  06/20/20 5905

## 2020-06-20 NOTE — ED PROVIDER NOTES
Day[facility]  ED  Emergency Department Encounter  EmergencyMedicine Resident     This patient was seen during the COVID-19 crisis. There were limited resources and those resources we did have had to be conserved for the sickest of patients. Pt Bong Castillo  MRN: [id number]  Birthdate 5/14/1926  Date of evaluation: 6/20/20  PCP:  Bola Batista MD    CHIEF COMPLAINT       Chief Complaint   Patient presents with    Fall     Pt fell at nursing home just prior to arrival. Pt staying at [facility] unit. Pt does not remember the fall or why she fell. She is unsure of what she hit her head or her face on     Facial Laceration     Pt has 2 cm laceration noted to the chin. Moderate amount of bleeding noted. Clean dressing placed over wound    Knee Pain     left knee       HISTORY OF PRESENT ILLNESS  (Location/Symptom, Timing/Onset, Context/Setting, Quality, Duration, Modifying Factors, Severity.)      Ronda Chen is a 80 y.o. female who presents with fall, patient was found to have left frontal subarachnoid. Patient transferred here. Patient does have DNR CC paperwork. Trauma team evaluated added on CT chest abdomen pelvis thoracic and lumbar as well as repeat CT head. Patient does have a history of positive COVID reported in April. Will discuss with trauma team regarding admission to [address] [place], [place] [address]ie [facility] unit as she has not had a negative test since then versus admission to them    [facility] 60 / SURGICAL / SOCIAL / FAMILY HISTORY      has a past medical history of Alzheimer disease ([facility] [address] Constipation, Depression, Glaucoma, and Hypertension. has no past surgical history on file. Social History     Socioeconomic History    Marital status:       Spouse name: Not on file    Number of children: Not on file    Years of education: Not on file    Highest education level: Not on file   Occupational History    Not on file   Social Needs    Financial Reason for Exam: fall Acuity: Acute Type of Exam: Initial FINDINGS: Prepatellar swelling. Joint effusion. No definitive fracture. Degenerative changes. No acute osseous abnormality but osteopenia somewhat limits evaluation. If concern is high for fracture, consider CT. Ct Head Wo Contrast    Result Date: 6/20/2020  EXAMINATION: CT OF THE HEAD WITHOUT CONTRAST  6/20/2020 2:56 am TECHNIQUE: CT of the head was performed without the administration of intravenous contrast. Dose modulation, iterative reconstruction, and/or weight based adjustment of the mA/kV was utilized to reduce the radiation dose to as low as reasonably achievable. COMPARISON: None. HISTORY: ORDERING SYSTEM PROVIDED HISTORY: fall, head contusion TECHNOLOGIST PROVIDED HISTORY: fall, head contusion Reason for Exam: fall Acuity: Acute Type of Exam: Initial FINDINGS: BRAIN/VENTRICLES: Right frontal scattered acute subarachnoid hemorrhage is present. No abnormal extra-axial fluid collection. The gray-white differentiation is maintained without evidence of an acute infarct. There is no evidence of hydrocephalus. Moderate diffuse decrease in cerebral volume is noted with corresponding prominence of the sulci and ventricles. Ill-defined hypoattenuation is noted within cerebral white matter consistent with moderate microvascular ischemic change. ORBITS: The visualized portion of the orbits demonstrate no acute abnormality. SINUSES: The visualized paranasal sinuses and mastoid air cells demonstrate no acute abnormality. SOFT TISSUES/SKULL:  No acute abnormality of the visualized skull or soft tissues. 1. Stable right frontal scattered acute subarachnoid hemorrhage. 2. Moderate cerebral white matter chronic microvascular ischemic disease. 3. Moderate diffuse cerebral atrophy.      Ct Head Wo Contrast    Result Date: 6/20/2020  EXAMINATION: CT OF THE HEAD WITHOUT CONTRAST; CT OF THE FACE WITHOUT CONTRAST  6/19/2020 11:57 pm TECHNIQUE: CT of the visualized skull or soft tissues. CT FACIAL BONES: FACIAL BONES: The maxilla, pterygoid plates and zygomatic arches are intact. The mandible is intact. The mandibular condyles are normally situated. Degenerate changes bilateral TMJs. Greatest on the left. The nasal bones and maxillary nasal processes are intact. ORBITS: The globes appear intact. The extraocular muscles, optic nerve sheath complexes and lacrimal glands appear unremarkable. No retrobulbar hematoma or mass is seen. The orbital walls and rims are intact. SINUSES/MASTOIDS: The paranasal sinuses and mastoid air cells are well aerated. No acute fracture is seen. SOFT TISSUES: No appreciable facial soft tissue swelling is seen. Mild-to-moderate right frontal subarachnoid hemorrhage. Moderate chronic small ischemic disease. No acute traumatic injury of the facial bones. Critical results were called by Dr. Jose C Chen to [facility] on 6/20/2020 at 00:53. Ct Cervical Spine Wo Contrast    Result Date: 6/20/2020  EXAMINATION: CT OF THE CERVICAL SPINE WITHOUT CONTRAST 6/19/2020 11:58 pm TECHNIQUE: CT of the cervical spine was performed without the administration of intravenous contrast. Multiplanar reformatted images are provided for review. Dose modulation, iterative reconstruction, and/or weight based adjustment of the mA/kV was utilized to reduce the radiation dose to as low as reasonably achievable. COMPARISON: None. HISTORY: ORDERING SYSTEM PROVIDED HISTORY: trauma TECHNOLOGIST PROVIDED HISTORY: trauma Reason for Exam: fall Acuity: Acute Type of Exam: Initial FINDINGS: BONES/ALIGNMENT: There is no acute fracture or traumatic malalignment. DEGENERATIVE CHANGES: Moderate multilevel degenerate changes most pronounced C4 through C5. SOFT TISSUES: There is no prevertebral soft tissue swelling. Minimal apical scarring versus nodularity     No acute fracture or traumatic malalignment of the cervical spine.      Ct Thoracic Spine Wo negative. Neurosurgery working on getting patient a brace    [KW]      ED Course User Index  [KW] Moises Arellano DO     Patient Signed out to Dr. Brian Sunshine awaiting a bed on the floor. PROCEDURES:  None    CONSULTS:  IP CONSULT TO NEUROSURGERY    CRITICAL CARE:  None    FINAL IMPRESSION      1. Fall, initial encounter    2. SAH (subarachnoid hemorrhage) (HCC)    3. Chin laceration, initial encounter    4.  Contusion of face, initial encounter          DISPOSITION / PLAN     DISPOSITION Admitted    PATIENT REFERRED TO:  Jerad Funes MD  [facility] [address] [place] [id number] [phone]            DISCHARGE MEDICATIONS:  New Prescriptions    No medications on file       Moises Arellano DO  Emergency Medicine Resident    (Please note that portions of this note were completed with a voicerecognition program.  Efforts were made to edit the dictations but occasionally words are mis-transcribed.)       Moises Arellano DO  06/20/20 5836

## 2020-06-20 NOTE — ED NOTES
[facility] to Lorenzo Carr, [facility] [place]  06/20/20 1226

## 2020-06-20 NOTE — ED PROVIDER NOTES
[facility] ED     Emergency Department     Faculty Attestation        I performed a history and physical examination of the patient and discussed management with the resident. I reviewed the residents note and agree with the documented findings and plan of care. Any areas of disagreement are noted on the chart. I was personally present for the key portions of any procedures. I have documented in the chart those procedures where I was not present during the key portions. I have reviewed the emergency nurses triage note. I agree with the chief complaint, past medical history, past surgical history, allergies, medications, social and family history as documented unless otherwise noted below. For mid-level providers such as nurse practitioners as well as physicians assistants:    I have personally seen and evaluated the patient. I find the patient's history and physical exam are consistent with NP/PA documentation. I agree with the care provided, treatment rendered, disposition, & follow-up plan. Additional findings are as noted. Vital Signs: BP (!) 185/72   Pulse 103   Temp 97.7 °F (36.5 °C) (Oral)   Resp 15   Wt 105 lb 4.8 oz (47.8 kg)   SpO2 96%   PCP:  Gloria Mathias MD    Pertinent Comments:   Patient transferred from outlying facility with mechanical fall CT shows subarachnoid hemorrhage. She is awake talking maintaining her airway.   Trauma at bedside patient's arrival        Critical Care  None          Kevin Peres MD  Attending Emergency Medicine Physician              Frantz Taylor MD  06/20/20 0798

## 2020-06-20 NOTE — ED PROVIDER NOTES
Faculty Sign-Out Attestation  Handoff taken on the following patient from prior Attending Physician: Felicita Machuca    I was available and discussed any additional care issues that arose and coordinated the management plans with the resident(s) caring for the patient during my duty period. Any areas of disagreement with residents documentation of care or procedures are noted on the chart. I was personally present for the key portions of any/all procedures during my duty period. I have documented in the chart those procedures where I was not present during the diez portions. Fall, was to be admitted, but not to be discharged with TLSO brace to be fitted at facility. Cleared by trauma and NS for discharge    Devin Bourgeois MD  Attending Physician     Devin Bourgeois MD  06/20/20 4912    Shortly before transport arrived patient became agitated. History of dementia history of sundowning, do see that she has Xanax as needed. Did give 0.25 and then 0.5 mg of Ativan IM. Patient was still combative requiring multiple staff members. Given this did not feel was appropriate to transport wanted to observe and again discussed with POA by phone. Given other injuries patient was given a small amount of fentanyl for pain control. At this time patient is resting comfortably, after resident spoke with power of , do not feel that additional work-up is warranted at this time and will arrange transport back to care facility.      Devin Bourgeois MD  06/20/20 0199

## 2020-06-20 NOTE — DISCHARGE SUMMARY
deformity  Neck: collared, no cervical tenderness  Back: no abrasion, step offs, or thoraco-lumbar tenderness  Pulmonary/Chest: CTAB, good air entry bilaterally  Cardiovascular: normal rate, regular rhythm  Abdomen: soft, non-tender, non-distended   Pelvic:pelvis stable, normal external genitalia, no perineal bruising or swelling  Extremities: tenderness over right hip, no deformity. Abrasion left knee with mild tenderness. no cyanosis, edema or gross deformity  Neurologic: moving all extremities, 5/5 strength throughout     LABS:     Recent Labs     06/19/20  2354 06/20/20  0249   WBC 5.4 10.5   HGB 10.7* 10.6*   HCT 31.4* 33.4*    267    133*   K 4.7 4.0    95*   CO2 25 21   BUN 20 16   CREATININE 1.23* 1.07*       DIAGNOSTIC TESTS:    Xr Pelvis (1-2 Views)    Result Date: 6/20/2020  EXAMINATION: ONE XRAY VIEW OF THE PELVIS 6/19/2020 11:57 pm COMPARISON: None. HISTORY: ORDERING SYSTEM PROVIDED HISTORY: trauma TECHNOLOGIST PROVIDED HISTORY: trauma Reason for Exam: fall Acuity: Acute Type of Exam: Initial FINDINGS: Demineralized osseous structures. The patient is rotated. Abnormal appearance of the right inferior pubic ramus and superior pubic ramus. No comparison exams are available. This may reflect sequela of acute or remote trauma. Degenerative changes of bilateral hips. Abnormal appearance of the right hemipelvis of uncertain acuity. Consider CT to exclude acute fracture. Xr Hip Right (2-3 Views)    Result Date: 6/20/2020  EXAMINATION: TWO XRAY VIEWS OF THE RIGHT HIP 6/20/2020 3:30 am COMPARISON: None. HISTORY: ORDERING SYSTEM PROVIDED HISTORY: pain, fall TECHNOLOGIST PROVIDED HISTORY: pain, fall Reason for Exam: trauma/ fall/ pain Acuity: Acute Type of Exam: Initial FINDINGS: Mild bilateral hip joint space narrowing is seen with mild osteophytosis present. No evidence of acute fracture, dislocation or subluxation is identified.  Inferior and superior right pubic ramus remote fracture trauma is evident. Bone mineralization is within normal limits. Sacroiliac joints are unremarkable. Surrounding soft tissues are normal in appearance. 1. No evidence of acute right hip trauma. 2. Inferior and superior right pubic ramus remote fracture. 3. Mild bilateral hip joint degenerative osteoarthritis. Xr Knee Left (3 Views)    Result Date: 6/20/2020  EXAMINATION: THREE XRAY VIEWS OF THE LEFT KNEE 6/20/2020 3:30 am COMPARISON: None. HISTORY: ORDERING SYSTEM PROVIDED HISTORY: trauma, pain TECHNOLOGIST PROVIDED HISTORY: trauma, pain Reason for Exam: trauma/ fall/ pain Acuity: Acute Type of Exam: Initial FINDINGS: Demineralized osseous structures. Degenerative changes of the knee. Mild prepatellar swelling. A radiodensity associated with the suprapatellar soft tissues of uncertain origin. Please correlate with any penetrating trauma. No acute osseous abnormality. Xr Knee Left (3 Views)    Result Date: 6/20/2020  EXAMINATION: THREE XRAY VIEWS OF THE LEFT KNEE 6/20/2020 12:49 am COMPARISON: None. HISTORY: ORDERING SYSTEM PROVIDED HISTORY: Pain TECHNOLOGIST PROVIDED HISTORY: Pain Reason for Exam: fall Acuity: Acute Type of Exam: Initial FINDINGS: Prepatellar swelling. Joint effusion. No definitive fracture. Degenerative changes. No acute osseous abnormality but osteopenia somewhat limits evaluation. If concern is high for fracture, consider CT. Ct Head Wo Contrast    Result Date: 6/20/2020  EXAMINATION: CT OF THE HEAD WITHOUT CONTRAST  6/20/2020 2:56 am TECHNIQUE: CT of the head was performed without the administration of intravenous contrast. Dose modulation, iterative reconstruction, and/or weight based adjustment of the mA/kV was utilized to reduce the radiation dose to as low as reasonably achievable. COMPARISON: None.  HISTORY: ORDERING SYSTEM PROVIDED HISTORY: fall, head contusion TECHNOLOGIST PROVIDED HISTORY: fall, head contusion Reason for Exam: fall Acuity: Acute Type of Exam: Initial FINDINGS: BRAIN/VENTRICLES: Right frontal scattered acute subarachnoid hemorrhage is present. No abnormal extra-axial fluid collection. The gray-white differentiation is maintained without evidence of an acute infarct. There is no evidence of hydrocephalus. Moderate diffuse decrease in cerebral volume is noted with corresponding prominence of the sulci and ventricles. Ill-defined hypoattenuation is noted within cerebral white matter consistent with moderate microvascular ischemic change. ORBITS: The visualized portion of the orbits demonstrate no acute abnormality. SINUSES: The visualized paranasal sinuses and mastoid air cells demonstrate no acute abnormality. SOFT TISSUES/SKULL:  No acute abnormality of the visualized skull or soft tissues. 1. Stable right frontal scattered acute subarachnoid hemorrhage. 2. Moderate cerebral white matter chronic microvascular ischemic disease. 3. Moderate diffuse cerebral atrophy. Ct Head Wo Contrast    Result Date: 6/20/2020  EXAMINATION: CT OF THE HEAD WITHOUT CONTRAST; CT OF THE FACE WITHOUT CONTRAST  6/19/2020 11:57 pm TECHNIQUE: CT of the head was performed without the administration of intravenous contrast. Dose modulation, iterative reconstruction, and/or weight based adjustment of the mA/kV was utilized to reduce the radiation dose to as low as reasonably achievable.; CT of the face was performed without the administration of intravenous contrast. Multiplanar reformatted images are provided for review. Dose modulation, iterative reconstruction, and/or weight based adjustment of the mA/kV was utilized to reduce the radiation dose to as low as reasonably achievable.  COMPARISON: 02/04/2008 HISTORY: ORDERING SYSTEM PROVIDED HISTORY: trauma TECHNOLOGIST PROVIDED HISTORY: trauma Reason for Exam: fall Acuity: Acute Type of Exam: Initial; ORDERING SYSTEM PROVIDED HISTORY: Trauma TECHNOLOGIST PROVIDED HISTORY: Trauma Reason for Exam: fall Acuity: Acute Type of Exam: Initial FINDINGS: CT HEAD: BRAIN/VENTRICLES: Mild-to-moderate right frontal subarachnoid hemorrhage. Moderate patchy periventricular subcortical white matter hypoattenuation suggestive of chronic small vessel ischemic disease. .  The gray-white differentiation is maintained without evidence of an acute infarct. There is no evidence of hydrocephalus. ORBITS: The visualized portion of the orbits demonstrate no acute abnormality. SINUSES: Mild ethmoid sinus mucosal thickening. Mastoids are clear. SOFT TISSUES/SKULL:  No acute abnormality of the visualized skull or soft tissues. CT FACIAL BONES: FACIAL BONES: The maxilla, pterygoid plates and zygomatic arches are intact. The mandible is intact. The mandibular condyles are normally situated. Degenerate changes bilateral TMJs. Greatest on the left. The nasal bones and maxillary nasal processes are intact. ORBITS: The globes appear intact. The extraocular muscles, optic nerve sheath complexes and lacrimal glands appear unremarkable. No retrobulbar hematoma or mass is seen. The orbital walls and rims are intact. SINUSES/MASTOIDS: The paranasal sinuses and mastoid air cells are well aerated. No acute fracture is seen. SOFT TISSUES: No appreciable facial soft tissue swelling is seen. Mild-to-moderate right frontal subarachnoid hemorrhage. Moderate chronic small ischemic disease. No acute traumatic injury of the facial bones. Critical results were called by Dr. Maribel Kamara to [facility] on 6/20/2020 at 00:53.      Ct Facial Bones Wo Contrast    Result Date: 6/20/2020  EXAMINATION: CT OF THE HEAD WITHOUT CONTRAST; CT OF THE FACE WITHOUT CONTRAST  6/19/2020 11:57 pm TECHNIQUE: CT of the head was performed without the administration of intravenous contrast. Dose modulation, iterative reconstruction, and/or weight based adjustment of the mA/kV was utilized to reduce the radiation dose to as low as reasonably achievable.; CT of the face was traumatic injury of the facial bones. Critical results were called by Dr. Marii De La Rosa to [facility] on 6/20/2020 at 00:53. Ct Cervical Spine Wo Contrast    Result Date: 6/20/2020  EXAMINATION: CT OF THE CERVICAL SPINE WITHOUT CONTRAST 6/19/2020 11:58 pm TECHNIQUE: CT of the cervical spine was performed without the administration of intravenous contrast. Multiplanar reformatted images are provided for review. Dose modulation, iterative reconstruction, and/or weight based adjustment of the mA/kV was utilized to reduce the radiation dose to as low as reasonably achievable. COMPARISON: None. HISTORY: ORDERING SYSTEM PROVIDED HISTORY: trauma TECHNOLOGIST PROVIDED HISTORY: trauma Reason for Exam: fall Acuity: Acute Type of Exam: Initial FINDINGS: BONES/ALIGNMENT: There is no acute fracture or traumatic malalignment. DEGENERATIVE CHANGES: Moderate multilevel degenerate changes most pronounced C4 through C5. SOFT TISSUES: There is no prevertebral soft tissue swelling. Minimal apical scarring versus nodularity     No acute fracture or traumatic malalignment of the cervical spine. Ct Thoracic Spine Wo Contrast    Result Date: 6/20/2020  EXAMINATION: CT OF THE THORACIC SPINE WITHOUT CONTRAST; CT OF THE LUMBAR SPINE WITHOUT CONTRAST  6/20/2020 2:56 am: TECHNIQUE: CT of the thoracic spine was performed without the administration of intravenous contrast. Multiplanar reformatted images are provided for review. Dose modulation, iterative reconstruction, and/or weight based adjustment of the mA/kV was utilized to reduce the radiation dose to as low as reasonably achievable.; CT of the lumbar spine was performed without the administration of intravenous contrast. Multiplanar reformatted images are provided for review. Dose modulation, iterative reconstruction, and/or weight based adjustment of the mA/kV was utilized to reduce the radiation dose to as low as reasonably achievable. COMPARISON: None.  HISTORY: ORDERING reconstruction, and/or weight based adjustment of the mA/kV was utilized to reduce the radiation dose to as low as reasonably achievable. COMPARISON: Chest portable June 20, 2020 at 0057 hours HISTORY: ORDERING SYSTEM PROVIDED HISTORY: fall, pain TECHNOLOGIST PROVIDED HISTORY: fall, pain Reason for Exam: fall Acuity: Acute Type of Exam: Initial FINDINGS: Chest: Mediastinum: The heart is normal in size and configuration. No evidence of pericardial effusion is seen. No evidence of mediastinal or hilar lymphadenopathy or mass lesion is identified. Lungs/pleura: The lungs are well aerated without evidence of consolidation. The pleural surfaces are unremarkable without evidence of pleural thickening or pleural effusion identified. Soft Tissues/Bones: The bones, skeletal muscle bundles, fascial planes and subcutaneous soft tissues are unremarkable in appearance. Abdomen/Pelvis: Organs: GI/Bowel: Numerous diverticular seen involving the sigmoid colon without evidence of adjacent inflammatory change within the mesenteric fat. The stomach is unremarkable without wall thickening or distention. Bowel loops are otherwise unremarkable in appearance without evidence of obstruction, distension or mucosal thickening. Pelvis: Peritoneum/Retroperitoneum: Bones/Soft Tissues: Right-sided spigelian hernia is seen at the level of the right iliac crest with defect within the abdominal wall measuring up to 15 mm in transverse dimension. Inferior right pubic ramus remote healed fracture is present. L4/L5 and L5/S1 severe bilateral facet degenerative hypertrophy is present. Mild depression of the superior endplate at L5 is seen with cortical discontinuity consistent with acute compression fracture which results in approximately 25% height loss. The bones, skeletal muscle bundles, fascial planes and subcutaneous soft tissues are otherwise unremarkable in appearance.      1. L5 vertebral body acute compression fracture with concavity of

## 2020-06-20 NOTE — ED NOTES
ER Resident speaking with patients son via telephone at this time  Patient to stay in ER until further details are available    Pt remains stable.  nondistressed       Yvonne Julien RN  06/20/20 3002

## 2020-06-20 NOTE — ED PROVIDER NOTES
[address] ED  Emergency Department  Emergency Medicine Resident Sign-out     Care of Laura Jama was assumed from Dr. Brandon Francois and is being seen for Fall (Pt fell at nursing home just prior to arrival. Pt staying at [facility] unit. Pt does not remember the fall or why she fell. She is unsure of what she hit her head or her face on ); Facial Laceration (Pt has 2 cm laceration noted to the chin. Moderate amount of bleeding noted. Clean dressing placed over wound); and Knee Pain (left knee)  . The patient's initial evaluation and plan have been discussed with the prior provider who initially evaluated the patient.      EMERGENCY DEPARTMENT COURSE / MEDICAL DECISION MAKING:       MEDICATIONS GIVEN:  Orders Placed This Encounter   Medications    cefTRIAXone (ROCEPHIN) 1 g IVPB in 50 mL D5W minibag    bacitracin ointment    DISCONTD: albuterol (PROVENTIL) nebulizer solution 2.5 mg    amLODIPine (NORVASC) tablet 5 mg    lisinopril (PRINIVIL;ZESTRIL) tablet 20 mg    metoprolol tartrate (LOPRESSOR) tablet 25 mg    sertraline (ZOLOFT) tablet 50 mg    traZODone (DESYREL) tablet 100 mg    methocarbamol (ROBAXIN) tablet 750 mg    sodium chloride flush 0.9 % injection 10 mL    sodium chloride flush 0.9 % injection 10 mL    acetaminophen (TYLENOL) tablet 650 mg    OR Linked Order Group     promethazine (PHENERGAN) tablet 12.5 mg     ondansetron (ZOFRAN) injection 4 mg    polyethylene glycol (GLYCOLAX) packet 17 g    0.9 % sodium chloride infusion    albuterol sulfate  (90 Base) MCG/ACT inhaler 2 puff    insulin lispro (HUMALOG) injection vial 10 Units    dextrose 50 % solution     NATALIE TOURE: cabinet override    dextrose 50 % IV solution       LABS / RADIOLOGY:     Labs Reviewed   CBC WITH AUTO DIFFERENTIAL - Abnormal; Notable for the following components:       Result Value    RBC 3.30 (*)     Hemoglobin 10.7 (*)     Hematocrit 31.4 (*)     All other maxillary nasal processes are intact. ORBITS: The globes appear intact. The extraocular muscles, optic nerve sheath complexes and lacrimal glands appear unremarkable. No retrobulbar hematoma or mass is seen. The orbital walls and rims are intact. SINUSES/MASTOIDS: The paranasal sinuses and mastoid air cells are well aerated. No acute fracture is seen. SOFT TISSUES: No appreciable facial soft tissue swelling is seen. Mild-to-moderate right frontal subarachnoid hemorrhage. Moderate chronic small ischemic disease. No acute traumatic injury of the facial bones. Critical results were called by Dr. Brent Mchugh to [facility] on 6/20/2020 at 00:53. Ct Facial Bones Wo Contrast    Result Date: 6/20/2020  EXAMINATION: CT OF THE HEAD WITHOUT CONTRAST; CT OF THE FACE WITHOUT CONTRAST  6/19/2020 11:57 pm TECHNIQUE: CT of the head was performed without the administration of intravenous contrast. Dose modulation, iterative reconstruction, and/or weight based adjustment of the mA/kV was utilized to reduce the radiation dose to as low as reasonably achievable.; CT of the face was performed without the administration of intravenous contrast. Multiplanar reformatted images are provided for review. Dose modulation, iterative reconstruction, and/or weight based adjustment of the mA/kV was utilized to reduce the radiation dose to as low as reasonably achievable. COMPARISON: 02/04/2008 HISTORY: ORDERING SYSTEM PROVIDED HISTORY: trauma TECHNOLOGIST PROVIDED HISTORY: trauma Reason for Exam: fall Acuity: Acute Type of Exam: Initial; ORDERING SYSTEM PROVIDED HISTORY: Trauma TECHNOLOGIST PROVIDED HISTORY: Trauma Reason for Exam: fall Acuity: Acute Type of Exam: Initial FINDINGS: CT HEAD: BRAIN/VENTRICLES: Mild-to-moderate right frontal subarachnoid hemorrhage. Moderate patchy periventricular subcortical white matter hypoattenuation suggestive of chronic small vessel ischemic disease. .  The gray-white differentiation is spine. SOFT TISSUES: No paraspinal mass is seen. 1. L5 vertebral body acute compression fracture with concavity of the superior endplate resulting approximately 25% height loss. 2. Remote vertebral body compression fractures at levels T7 and L1, as discussed above. Xr Chest Portable    Result Date: 6/20/2020  EXAMINATION: ONE XRAY VIEW OF THE CHEST 6/19/2020 11:56 pm COMPARISON: None. HISTORY: ORDERING SYSTEM PROVIDED HISTORY: trauma TECHNOLOGIST PROVIDED HISTORY: trauma Reason for Exam: fall Acuity: Acute Type of Exam: Initial FINDINGS: Chronic pulmonary changes. No consolidation, effusion, or pneumothorax. The heart is not enlarged. Degenerative changes of the skeleton. Chronic changes without focal airspace consolidation. Ct Chest Abdomen Pelvis Wo Contrast    Result Date: 6/20/2020  EXAMINATION: CT OF THE CHEST, ABDOMEN, AND PELVIS WITHOUT CONTRAST 6/20/2020 2:55 am TECHNIQUE: CT of the chest, abdomen and pelvis was performed without the administration of intravenous contrast. Multiplanar reformatted images are provided for review. Dose modulation, iterative reconstruction, and/or weight based adjustment of the mA/kV was utilized to reduce the radiation dose to as low as reasonably achievable. COMPARISON: Chest portable June 20, 2020 at 0057 hours HISTORY: ORDERING SYSTEM PROVIDED HISTORY: fall, pain TECHNOLOGIST PROVIDED HISTORY: fall, pain Reason for Exam: fall Acuity: Acute Type of Exam: Initial FINDINGS: Chest: Mediastinum: The heart is normal in size and configuration. No evidence of pericardial effusion is seen. No evidence of mediastinal or hilar lymphadenopathy or mass lesion is identified. Lungs/pleura: The lungs are well aerated without evidence of consolidation. The pleural surfaces are unremarkable without evidence of pleural thickening or pleural effusion identified. Soft Tissues/Bones:  The bones, skeletal muscle bundles, fascial planes and subcutaneous soft tissues are

## 2020-06-20 NOTE — PROGRESS NOTES
right pubic ramus remote fracture. 3. Mild bilateral hip joint degenerative osteoarthritis. CT THORACIC SPINE WO CONTRAST   Final Result   1. L5 vertebral body acute compression fracture with concavity of the   superior endplate resulting approximately 25% height loss. 2. Remote vertebral body compression fractures at levels T7 and L1, as   discussed above. CT LUMBAR SPINE WO CONTRAST   Final Result   1. L5 vertebral body acute compression fracture with concavity of the   superior endplate resulting approximately 25% height loss. 2. Remote vertebral body compression fractures at levels T7 and L1, as   discussed above. CT CHEST ABDOMEN PELVIS WO CONTRAST   Final Result   1. L5 vertebral body acute compression fracture with concavity of the   superior endplate resulting approximately 25% height loss. 2. Otherwise, no further evidence of acute trauma involving the chest,   abdomen or pelvis. 3. Inferior right pubic ramus healed remote fracture. 4. Sigmoid colon diverticulosis without evidence of diverticulitis. 5. Right-sided spigelian hernia without evidence of bowel involvement. 6. L4/L5 and L5/S1 severe bilateral facet degenerative arthrosis. 7. Unremarkable noncontrast CT chest examination. CT HEAD WO CONTRAST   Final Result   1. Stable right frontal scattered acute subarachnoid hemorrhage. 2. Moderate cerebral white matter chronic microvascular ischemic disease. 3. Moderate diffuse cerebral atrophy. XR CHEST PORTABLE   Final Result   Chronic changes without focal airspace consolidation. XR PELVIS (1-2 VIEWS)   Final Result   Abnormal appearance of the right hemipelvis of uncertain acuity. Consider CT   to exclude acute fracture. XR KNEE LEFT (3 VIEWS)   Final Result   No acute osseous abnormality but osteopenia somewhat limits evaluation. If   concern is high for fracture, consider CT.          CT Head WO Contrast   Final Result Mild-to-moderate right frontal subarachnoid hemorrhage. Moderate chronic small ischemic disease. No acute traumatic injury of the facial bones. Critical results were called by Dr. Pramod Muhammad to [facility] on   6/20/2020 at 00:53. CT Cervical Spine WO Contrast   Final Result   No acute fracture or traumatic malalignment of the cervical spine. CT Facial Bones WO Contrast   Final Result   Mild-to-moderate right frontal subarachnoid hemorrhage. Moderate chronic small ischemic disease. No acute traumatic injury of the facial bones. Critical results were called by Dr. Pramod Muhammad to [facility] on   6/20/2020 at 00:53. PHYSICAL EXAM:   GCS:  4 - Opens eyes on own   6 - Follows simple motor commands  5 - Alert and oriented    Pupil size:  Deffered d/t COVID   Pupil reaction: Deffered d/t COVID   Wiggles fingers: Left Yes Right Yes  Hand grasp:  Deffered d/t COVID   Wiggles toes: Left Yes    Right Yes  Plantar flexion: Deffered d/t COVID     Spine:     Deffered d/t COVID, Pt states she did get to sit on edge of bed    Musculoskeletal    Deffered d/t COVID       CONSULTS: NS    PROCEDURES:     INJURIES:        Patient Active Problem List   Diagnosis    SAH (subarachnoid hemorrhage) ([facility])       Assessment/Plan:     L knee Xray reviewed; no acute fracture, Prepatellar swelling  No further concerns  Await neurosurgery attending exam and recommendations.

## 2020-06-20 NOTE — PROGRESS NOTES
Speech Language Pathology  Missed Treatment Note  Date: 20  Patient Name: Lesia Devries       Room:   MRN: [id number]   Account #: [de-identified]    : 1926  (80 y.o.)  Gender: female        Following the Recommendations of the Center for Disease Control (CDC) and the [place] [address] for Coronavirus Disease that:       Only essential personnel should enter the room of a patient of known or suspected Coronavirus. The number of staff who enter the room should be minimized.   It is recommended that activities be \"bundled\" to limit number of times the room is entered.   Facilities should care for these patients with dedicated health care personnel (STAFF) to minimize risk of transmission and exposure to other patients and STAFF. Should patient fail a nursing swallow screen and need a formal swallow evaluation completed by Speech Therapy, ST recommendations are as follows:     Due to excessive coughing as a symptom of this disease, inability to rule out silent aspiration at bedside and prolonged intubation, ST would be unable to make diet recommendation based on the result of bedside swallow evaluation. The patient would need a videoflouroscopic swallow evaluation to make diet recommendations about aspiration precautions. Due to restrictions on patient movement within the facility to restrict disease transmission to staff and others in the hospital, this patient would be unable leave their room to have videoflouroscopic swallow assessment. ST would assess patient's swallow when they cleared as negative for Coronavirus Isolation and are able to travel to radiology to have a swallow study completed.          Mita Brito M.S. [facility]

## 2020-06-20 NOTE — ED NOTES
[facility] present for transport  Patient stable  Nondistressed       [facility]oraP[facility]  06/20/20 0088

## 2020-06-20 NOTE — ED NOTES
Pt placed in droplet precautions. Pts brief changed, pt skin cleansed, barrier wipes used. Pt had liquid BM on bed peterson.        Chen Ji RN  06/20/20 [id number]

## 2020-06-20 NOTE — ED PROVIDER NOTES
[facility] ED  Emergency Department  Faculty Sign-Out Addendum     Care of Stephanie Nelson was assumed from previous attending and is being seen for Fall (Pt fell at nursing home just prior to arrival. Pt staying at [facility] unit. Pt does not remember the fall or why she fell. She is unsure of what she hit her head or her face on ); Facial Laceration (Pt has 2 cm laceration noted to the chin. Moderate amount of bleeding noted. Clean dressing placed over wound); and Knee Pain (left knee)  . The patient's initial evaluation and plan have been discussed with the prior provider who initially evaluated the patient. EMERGENCY DEPARTMENT COURSE / MEDICAL DECISION MAKING:       MEDICATIONS GIVEN:  Orders Placed This Encounter   Medications    cefTRIAXone (ROCEPHIN) 1 g IVPB in 50 mL D5W minibag    bacitracin ointment       LABS / RADIOLOGY:     Labs Reviewed   CBC WITH AUTO DIFFERENTIAL - Abnormal; Notable for the following components:       Result Value    RBC 3.30 (*)     Hemoglobin 10.7 (*)     Hematocrit 31.4 (*)     All other components within normal limits   BASIC METABOLIC PANEL - Abnormal; Notable for the following components:    Glucose 117 (*)     CREATININE 1.23 (*)     GFR Non- 41 (*)     GFR  49 (*)     All other components within normal limits   TROP/MYOGLOBIN - Abnormal; Notable for the following components:    Troponin, High Sensitivity 53 (*)     Myoglobin 140 (*)     All other components within normal limits   URINE RT REFLEX TO CULTURE - Abnormal; Notable for the following components:    Turbidity UA SLIGHTLY CLOUDY (*)     Urine Hgb SMALL (*)     Leukocyte Esterase, Urine SMALL (*)     All other components within normal limits   MICROSCOPIC URINALYSIS - Abnormal; Notable for the following components:    Bacteria, UA MANY (*)     Other Observations UA Culture ordered based on defined criteria.  (*)     All other components osteoarthritis. Xr Knee Left (3 Views)    Result Date: 6/20/2020  EXAMINATION: THREE XRAY VIEWS OF THE LEFT KNEE 6/20/2020 3:30 am COMPARISON: None. HISTORY: ORDERING SYSTEM PROVIDED HISTORY: trauma, pain TECHNOLOGIST PROVIDED HISTORY: trauma, pain Reason for Exam: trauma/ fall/ pain Acuity: Acute Type of Exam: Initial FINDINGS: Demineralized osseous structures. Degenerative changes of the knee. Mild prepatellar swelling. A radiodensity associated with the suprapatellar soft tissues of uncertain origin. Please correlate with any penetrating trauma. No acute osseous abnormality. Xr Knee Left (3 Views)    Result Date: 6/20/2020  EXAMINATION: THREE XRAY VIEWS OF THE LEFT KNEE 6/20/2020 12:49 am COMPARISON: None. HISTORY: ORDERING SYSTEM PROVIDED HISTORY: Pain TECHNOLOGIST PROVIDED HISTORY: Pain Reason for Exam: fall Acuity: Acute Type of Exam: Initial FINDINGS: Prepatellar swelling. Joint effusion. No definitive fracture. Degenerative changes. No acute osseous abnormality but osteopenia somewhat limits evaluation. If concern is high for fracture, consider CT. Ct Head Wo Contrast    Result Date: 6/20/2020  EXAMINATION: CT OF THE HEAD WITHOUT CONTRAST  6/20/2020 2:56 am TECHNIQUE: CT of the head was performed without the administration of intravenous contrast. Dose modulation, iterative reconstruction, and/or weight based adjustment of the mA/kV was utilized to reduce the radiation dose to as low as reasonably achievable. COMPARISON: None. HISTORY: ORDERING SYSTEM PROVIDED HISTORY: fall, head contusion TECHNOLOGIST PROVIDED HISTORY: fall, head contusion Reason for Exam: fall Acuity: Acute Type of Exam: Initial FINDINGS: BRAIN/VENTRICLES: Right frontal scattered acute subarachnoid hemorrhage is present. No abnormal extra-axial fluid collection. The gray-white differentiation is maintained without evidence of an acute infarct. There is no evidence of hydrocephalus.  Moderate diffuse decrease of an acute infarct. There is no evidence of hydrocephalus. ORBITS: The visualized portion of the orbits demonstrate no acute abnormality. SINUSES: Mild ethmoid sinus mucosal thickening. Mastoids are clear. SOFT TISSUES/SKULL:  No acute abnormality of the visualized skull or soft tissues. CT FACIAL BONES: FACIAL BONES: The maxilla, pterygoid plates and zygomatic arches are intact. The mandible is intact. The mandibular condyles are normally situated. Degenerate changes bilateral TMJs. Greatest on the left. The nasal bones and maxillary nasal processes are intact. ORBITS: The globes appear intact. The extraocular muscles, optic nerve sheath complexes and lacrimal glands appear unremarkable. No retrobulbar hematoma or mass is seen. The orbital walls and rims are intact. SINUSES/MASTOIDS: The paranasal sinuses and mastoid air cells are well aerated. No acute fracture is seen. SOFT TISSUES: No appreciable facial soft tissue swelling is seen. Mild-to-moderate right frontal subarachnoid hemorrhage. Moderate chronic small ischemic disease. No acute traumatic injury of the facial bones. Critical results were called by Dr. Jose C Chen to [facility] on 6/20/2020 at 00:53. Ct Facial Bones Wo Contrast    Result Date: 6/20/2020  EXAMINATION: CT OF THE HEAD WITHOUT CONTRAST; CT OF THE FACE WITHOUT CONTRAST  6/19/2020 11:57 pm TECHNIQUE: CT of the head was performed without the administration of intravenous contrast. Dose modulation, iterative reconstruction, and/or weight based adjustment of the mA/kV was utilized to reduce the radiation dose to as low as reasonably achievable.; CT of the face was performed without the administration of intravenous contrast. Multiplanar reformatted images are provided for review. Dose modulation, iterative reconstruction, and/or weight based adjustment of the mA/kV was utilized to reduce the radiation dose to as low as reasonably achievable.  COMPARISON: 02/04/2008 HISTORY: is normal alignment of the spine. T7 vertebral body increased can cavity of the superior endplate is noted with approximately 30% height loss noted. L1 vertebral body 90% height loss is identified. Increased can cavity of the superior L5 endplate is seen with cortical discontinuity visualized consistent with acute compression fracture resulting approximately 25% height loss. No osseous destructive lesion is seen. DEGENERATIVE CHANGES: No gross spinal canal stenosis or bony neural foraminal narrowing of the thoracic spine. SOFT TISSUES: No paraspinal mass is seen. 1. L5 vertebral body acute compression fracture with concavity of the superior endplate resulting approximately 25% height loss. 2. Remote vertebral body compression fractures at levels T7 and L1, as discussed above. Ct Lumbar Spine Wo Contrast    Result Date: 6/20/2020  EXAMINATION: CT OF THE THORACIC SPINE WITHOUT CONTRAST; CT OF THE LUMBAR SPINE WITHOUT CONTRAST  6/20/2020 2:56 am: TECHNIQUE: CT of the thoracic spine was performed without the administration of intravenous contrast. Multiplanar reformatted images are provided for review. Dose modulation, iterative reconstruction, and/or weight based adjustment of the mA/kV was utilized to reduce the radiation dose to as low as reasonably achievable.; CT of the lumbar spine was performed without the administration of intravenous contrast. Multiplanar reformatted images are provided for review. Dose modulation, iterative reconstruction, and/or weight based adjustment of the mA/kV was utilized to reduce the radiation dose to as low as reasonably achievable. COMPARISON: None.  HISTORY: ORDERING SYSTEM PROVIDED HISTORY: trauma, fall TECHNOLOGIST PROVIDED HISTORY: trauma, fall Reason for Exam: fall Acuity: Acute Type of Exam: Initial; ORDERING SYSTEM PROVIDED HISTORY: TRAUMA TECHNOLOGIST PROVIDED HISTORY: fall, trauma Reason for Exam: fall Acuity: Acute Type of Exam: Initial FINDINGS: BONES/ALIGNMENT: There is normal alignment of the spine. T7 vertebral body increased can cavity of the superior endplate is noted with approximately 30% height loss noted. L1 vertebral body 90% height loss is identified. Increased can cavity of the superior L5 endplate is seen with cortical discontinuity visualized consistent with acute compression fracture resulting approximately 25% height loss. No osseous destructive lesion is seen. DEGENERATIVE CHANGES: No gross spinal canal stenosis or bony neural foraminal narrowing of the thoracic spine. SOFT TISSUES: No paraspinal mass is seen. 1. L5 vertebral body acute compression fracture with concavity of the superior endplate resulting approximately 25% height loss. 2. Remote vertebral body compression fractures at levels T7 and L1, as discussed above. Xr Chest Portable    Result Date: 6/20/2020  EXAMINATION: ONE XRAY VIEW OF THE CHEST 6/19/2020 11:56 pm COMPARISON: None. HISTORY: ORDERING SYSTEM PROVIDED HISTORY: trauma TECHNOLOGIST PROVIDED HISTORY: trauma Reason for Exam: fall Acuity: Acute Type of Exam: Initial FINDINGS: Chronic pulmonary changes. No consolidation, effusion, or pneumothorax. The heart is not enlarged. Degenerative changes of the skeleton. Chronic changes without focal airspace consolidation. Ct Chest Abdomen Pelvis Wo Contrast    Result Date: 6/20/2020  EXAMINATION: CT OF THE CHEST, ABDOMEN, AND PELVIS WITHOUT CONTRAST 6/20/2020 2:55 am TECHNIQUE: CT of the chest, abdomen and pelvis was performed without the administration of intravenous contrast. Multiplanar reformatted images are provided for review. Dose modulation, iterative reconstruction, and/or weight based adjustment of the mA/kV was utilized to reduce the radiation dose to as low as reasonably achievable.  COMPARISON: Chest portable June 20, 2020 at 0057 hours HISTORY: ORDERING SYSTEM PROVIDED HISTORY: fall, pain TECHNOLOGIST PROVIDED HISTORY: fall, pain Reason for Exam: fall Acuity: Acute Type of Exam: Initial FINDINGS: Chest: Mediastinum: The heart is normal in size and configuration. No evidence of pericardial effusion is seen. No evidence of mediastinal or hilar lymphadenopathy or mass lesion is identified. Lungs/pleura: The lungs are well aerated without evidence of consolidation. The pleural surfaces are unremarkable without evidence of pleural thickening or pleural effusion identified. Soft Tissues/Bones: The bones, skeletal muscle bundles, fascial planes and subcutaneous soft tissues are unremarkable in appearance. Abdomen/Pelvis: Organs: GI/Bowel: Numerous diverticular seen involving the sigmoid colon without evidence of adjacent inflammatory change within the mesenteric fat. The stomach is unremarkable without wall thickening or distention. Bowel loops are otherwise unremarkable in appearance without evidence of obstruction, distension or mucosal thickening. Pelvis: Peritoneum/Retroperitoneum: Bones/Soft Tissues: Right-sided spigelian hernia is seen at the level of the right iliac crest with defect within the abdominal wall measuring up to 15 mm in transverse dimension. Inferior right pubic ramus remote healed fracture is present. L4/L5 and L5/S1 severe bilateral facet degenerative hypertrophy is present. Mild depression of the superior endplate at L5 is seen with cortical discontinuity consistent with acute compression fracture which results in approximately 25% height loss. The bones, skeletal muscle bundles, fascial planes and subcutaneous soft tissues are otherwise unremarkable in appearance. 1. L5 vertebral body acute compression fracture with concavity of the superior endplate resulting approximately 25% height loss. 2. Otherwise, no further evidence of acute trauma involving the chest, abdomen or pelvis. 3. Inferior right pubic ramus healed remote fracture. 4. Sigmoid colon diverticulosis without evidence of diverticulitis.  5. Right-sided spigelian hernia without evidence of bowel involvement. 6. L4/L5 and L5/S1 severe bilateral facet degenerative arthrosis. 7. Unremarkable noncontrast CT chest examination. RECENT VITALS:     Temp: 97.7 °F (36.5 °C),  Pulse: 103, Resp: 15, BP: (!) 185/72, SpO2: 96 %    This patient is a 80 y.o. Female with a fall. She is DNR CC at a [facility]. She has laceration of the chin. CT imaging shows subarachnoid hemorrhage. She is being admitted to palliative care by trauma service. OUTSTANDING TASKS / RECOMMENDATIONS:    1. Analgesics  2. Monitor  3. Await admission      Tereza Saab. Cj Tidwell MD, Mervin Mcclure  Attending Emergency Physician  [address] ED       Leah Foley MD  06/20/20 [id number]    EKG Interpretation #2    Interpreted by me    Rhythm: normal sinus   Rate: normal  Axis: normal  Ectopy: none  Conduction: normal  ST Segments: depression laterally, minimal elevation V2  T Waves: inversion V2  Q Waves: none    Clinical Impression: nonspecific ST changes, consider ischemia    Patient reevaluated by the trauma team.  She may or may not be willing to have a back brace for her lower spine compression fracture. Awaiting neurosurgical input. Plan now may be to discharge return to the ECF with wound care analgesics and symptom relief.        Leah Foley MD  06/20/20 [id number]

## 2020-06-21 LAB
CULTURE: ABNORMAL
Lab: ABNORMAL
SPECIMEN DESCRIPTION: ABNORMAL

## 2020-06-24 LAB
EKG ATRIAL RATE: 87 BPM
EKG ATRIAL RATE: 97 BPM
EKG P AXIS: 70 DEGREES
EKG P AXIS: 76 DEGREES
EKG P-R INTERVAL: 128 MS
EKG P-R INTERVAL: 156 MS
EKG Q-T INTERVAL: 342 MS
EKG Q-T INTERVAL: 346 MS
EKG QRS DURATION: 72 MS
EKG QRS DURATION: 72 MS
EKG QTC CALCULATION (BAZETT): 411 MS
EKG QTC CALCULATION (BAZETT): 439 MS
EKG R AXIS: 57 DEGREES
EKG R AXIS: 58 DEGREES
EKG T AXIS: 55 DEGREES
EKG T AXIS: 58 DEGREES
EKG VENTRICULAR RATE: 87 BPM
EKG VENTRICULAR RATE: 97 BPM

## 2020-07-07 ENCOUNTER — HOSPITAL ENCOUNTER (OUTPATIENT)
Dept: CT IMAGING | Age: 85
Discharge: HOME | DRG: 025 | End: 2020-07-09
Payer: MEDICARE

## 2020-07-07 PROCEDURE — 70450 CT HEAD/BRAIN W/O DYE: CPT

## 2020-07-08 ENCOUNTER — TELEPHONE (OUTPATIENT)
Dept: NEUROSURGERY | Age: 85
End: 2020-07-08

## 2020-07-08 NOTE — TELEPHONE ENCOUNTER
Attempted to contact pt - number was disconnected. Lm with her son Amy Zavala to call us please asap and also attempted to contact Peg[facility] - number also disconnected. From Dr Emiliano Conti-  This patient has significant change in CT head and need to come see me ASAP, this Thursday ok, but if she is have neurological symptoms, needs to come to ED.

## 2020-07-08 NOTE — TELEPHONE ENCOUNTER
[facility] called to schedule an appt for [facility]. - she has not been retested and is last known to be positive for COVID. Her nurse Vega Dupont states she is more confused lately - I did inform her about the CT and to take her to the ED if needed. Please advise on scheduling pt to be seen.

## 2020-07-09 ENCOUNTER — HOSPITAL ENCOUNTER (INPATIENT)
Age: 85
LOS: 7 days | Discharge: HOSPICE | DRG: 025 | End: 2020-07-16
Attending: EMERGENCY MEDICINE | Admitting: FAMILY MEDICINE
Payer: MEDICARE

## 2020-07-09 ENCOUNTER — APPOINTMENT (OUTPATIENT)
Dept: CT IMAGING | Age: 85
DRG: 025 | End: 2020-07-09
Payer: MEDICARE

## 2020-07-09 ENCOUNTER — APPOINTMENT (OUTPATIENT)
Dept: GENERAL RADIOLOGY | Age: 85
DRG: 025 | End: 2020-07-09
Payer: MEDICARE

## 2020-07-09 PROBLEM — S06.5XAA SUBDURAL HEMATOMA: Status: ACTIVE | Noted: 2020-07-09

## 2020-07-09 PROBLEM — I60.9 SUBARACHNOID HEMORRHAGE (HCC): Status: ACTIVE | Noted: 2020-07-09

## 2020-07-09 PROBLEM — S09.90XA CHI (CLOSED HEAD INJURY), INITIAL ENCOUNTER: Status: ACTIVE | Noted: 2020-07-09

## 2020-07-09 PROBLEM — H40.9 GLAUCOMA: Status: ACTIVE | Noted: 2020-07-09

## 2020-07-09 PROBLEM — I10 ESSENTIAL HYPERTENSION: Status: ACTIVE | Noted: 2020-07-09

## 2020-07-09 PROBLEM — F02.80 ALZHEIMER DISEASE (HCC): Status: ACTIVE | Noted: 2020-07-09

## 2020-07-09 PROBLEM — D53.9 MACROCYTIC ANEMIA: Status: ACTIVE | Noted: 2020-07-09

## 2020-07-09 PROBLEM — I16.0 HYPERTENSIVE URGENCY: Status: ACTIVE | Noted: 2020-07-09

## 2020-07-09 PROBLEM — G30.9 ALZHEIMER DISEASE (HCC): Status: ACTIVE | Noted: 2020-07-09

## 2020-07-09 LAB
-: NORMAL
ABSOLUTE EOS #: 0.06 K/UL (ref 0–0.44)
ABSOLUTE IMMATURE GRANULOCYTE: <0.03 K/UL (ref 0–0.3)
ABSOLUTE LYMPH #: 2.29 K/UL (ref 1.1–3.7)
ABSOLUTE MONO #: 0.4 K/UL (ref 0.1–1.2)
AMORPHOUS: NORMAL
ANION GAP SERPL CALCULATED.3IONS-SCNC: 11 MMOL/L (ref 9–17)
BACTERIA: NORMAL
BASOPHILS # BLD: 1 % (ref 0–2)
BASOPHILS ABSOLUTE: 0.04 K/UL (ref 0–0.2)
BILIRUBIN URINE: NEGATIVE
BUN BLDV-MCNC: 10 MG/DL (ref 8–23)
BUN/CREAT BLD: NORMAL (ref 9–20)
CALCIUM SERPL-MCNC: 8.8 MG/DL (ref 8.6–10.4)
CASTS UA: NORMAL /LPF (ref 0–8)
CHLORIDE BLD-SCNC: 106 MMOL/L (ref 98–107)
CO2: 23 MMOL/L (ref 20–31)
COLOR: YELLOW
COMMENT UA: ABNORMAL
CREAT SERPL-MCNC: 0.74 MG/DL (ref 0.5–0.9)
CRYSTALS, UA: NORMAL /HPF
DIFFERENTIAL TYPE: ABNORMAL
EOSINOPHILS RELATIVE PERCENT: 1 % (ref 1–4)
EPITHELIAL CELLS UA: NORMAL /HPF (ref 0–5)
GFR AFRICAN AMERICAN: NORMAL ML/MIN
GFR NON-AFRICAN AMERICAN: NORMAL ML/MIN
GFR SERPL CREATININE-BSD FRML MDRD: NORMAL ML/MIN/{1.73_M2}
GFR SERPL CREATININE-BSD FRML MDRD: NORMAL ML/MIN/{1.73_M2}
GLUCOSE BLD-MCNC: 97 MG/DL (ref 70–99)
GLUCOSE URINE: NEGATIVE
HCT VFR BLD CALC: 32.1 % (ref 36.3–47.1)
HEMOGLOBIN: 10.1 G/DL (ref 11.9–15.1)
IMMATURE GRANULOCYTES: 0 %
INR BLD: 1
KETONES, URINE: NEGATIVE
LEUKOCYTE ESTERASE, URINE: ABNORMAL
LYMPHOCYTES # BLD: 44 % (ref 24–43)
MCH RBC QN AUTO: 31.9 PG (ref 25.2–33.5)
MCHC RBC AUTO-ENTMCNC: 31.5 G/DL (ref 28.4–34.8)
MCV RBC AUTO: 101.3 FL (ref 82.6–102.9)
MONOCYTES # BLD: 8 % (ref 3–12)
MUCUS: NORMAL
NITRITE, URINE: NEGATIVE
NRBC AUTOMATED: 0 PER 100 WBC
OTHER OBSERVATIONS UA: NORMAL
PDW BLD-RTO: 14.4 % (ref 11.8–14.4)
PH UA: 5 (ref 5–8)
PLATELET # BLD: 296 K/UL (ref 138–453)
PLATELET ESTIMATE: ABNORMAL
PMV BLD AUTO: 10.1 FL (ref 8.1–13.5)
POTASSIUM SERPL-SCNC: 4.2 MMOL/L (ref 3.7–5.3)
PROTEIN UA: NEGATIVE
PROTHROMBIN TIME: 10.6 SEC (ref 9–12)
RBC # BLD: 3.17 M/UL (ref 3.95–5.11)
RBC # BLD: ABNORMAL 10*6/UL
RBC UA: NORMAL /HPF (ref 0–4)
RENAL EPITHELIAL, UA: NORMAL /HPF
SARS-COV-2, PCR: NORMAL
SARS-COV-2, RAPID: NOT DETECTED
SARS-COV-2: NORMAL
SEG NEUTROPHILS: 46 % (ref 36–65)
SEGMENTED NEUTROPHILS ABSOLUTE COUNT: 2.37 K/UL (ref 1.5–8.1)
SODIUM BLD-SCNC: 140 MMOL/L (ref 135–144)
SOURCE: NORMAL
SPECIFIC GRAVITY UA: 1.02 (ref 1–1.03)
TRICHOMONAS: NORMAL
TURBIDITY: CLEAR
URINE HGB: NEGATIVE
UROBILINOGEN, URINE: NORMAL
WBC # BLD: 5.2 K/UL (ref 3.5–11.3)
WBC # BLD: ABNORMAL 10*3/UL
WBC UA: NORMAL /HPF (ref 0–5)
YEAST: NORMAL

## 2020-07-09 PROCEDURE — 1200000000 HC SEMI PRIVATE

## 2020-07-09 PROCEDURE — 99285 EMERGENCY DEPT VISIT HI MDM: CPT

## 2020-07-09 PROCEDURE — 99223 1ST HOSP IP/OBS HIGH 75: CPT | Performed by: INTERNAL MEDICINE

## 2020-07-09 PROCEDURE — U0002 COVID-19 LAB TEST NON-CDC: HCPCS

## 2020-07-09 PROCEDURE — 71045 X-RAY EXAM CHEST 1 VIEW: CPT

## 2020-07-09 PROCEDURE — 70450 CT HEAD/BRAIN W/O DYE: CPT

## 2020-07-09 PROCEDURE — 2580000003 HC RX 258: Performed by: STUDENT IN AN ORGANIZED HEALTH CARE EDUCATION/TRAINING PROGRAM

## 2020-07-09 PROCEDURE — 73562 X-RAY EXAM OF KNEE 3: CPT

## 2020-07-09 PROCEDURE — 73060 X-RAY EXAM OF HUMERUS: CPT

## 2020-07-09 PROCEDURE — 85610 PROTHROMBIN TIME: CPT

## 2020-07-09 PROCEDURE — 85025 COMPLETE CBC W/AUTO DIFF WBC: CPT

## 2020-07-09 PROCEDURE — 81001 URINALYSIS AUTO W/SCOPE: CPT

## 2020-07-09 PROCEDURE — 80048 BASIC METABOLIC PNL TOTAL CA: CPT

## 2020-07-09 RX ORDER — PROMETHAZINE HYDROCHLORIDE 25 MG/1
12.5 TABLET ORAL EVERY 6 HOURS PRN
Status: DISCONTINUED | OUTPATIENT
Start: 2020-07-09 | End: 2020-07-16 | Stop reason: HOSPADM

## 2020-07-09 RX ORDER — LISINOPRIL 20 MG/1
20 TABLET ORAL DAILY
Status: DISCONTINUED | OUTPATIENT
Start: 2020-07-10 | End: 2020-07-15

## 2020-07-09 RX ORDER — SODIUM CHLORIDE 9 MG/ML
10 INJECTION INTRAVENOUS DAILY
Status: DISCONTINUED | OUTPATIENT
Start: 2020-07-10 | End: 2020-07-16 | Stop reason: HOSPADM

## 2020-07-09 RX ORDER — MAGNESIUM SULFATE 1 G/100ML
1 INJECTION INTRAVENOUS PRN
Status: DISCONTINUED | OUTPATIENT
Start: 2020-07-09 | End: 2020-07-16 | Stop reason: HOSPADM

## 2020-07-09 RX ORDER — AMLODIPINE BESYLATE 5 MG/1
5 TABLET ORAL DAILY
Status: DISCONTINUED | OUTPATIENT
Start: 2020-07-10 | End: 2020-07-11

## 2020-07-09 RX ORDER — LATANOPROST 50 UG/ML
1 SOLUTION/ DROPS OPHTHALMIC NIGHTLY
Status: DISCONTINUED | OUTPATIENT
Start: 2020-07-09 | End: 2020-07-16 | Stop reason: HOSPADM

## 2020-07-09 RX ORDER — KETOTIFEN FUMARATE 0.35 MG/ML
1 SOLUTION/ DROPS OPHTHALMIC 2 TIMES DAILY
Status: ON HOLD | COMMUNITY
End: 2020-07-16 | Stop reason: HOSPADM

## 2020-07-09 RX ORDER — ONDANSETRON 2 MG/ML
4 INJECTION INTRAMUSCULAR; INTRAVENOUS EVERY 6 HOURS PRN
Status: DISCONTINUED | OUTPATIENT
Start: 2020-07-09 | End: 2020-07-16 | Stop reason: HOSPADM

## 2020-07-09 RX ORDER — SODIUM CHLORIDE 0.9 % (FLUSH) 0.9 %
10 SYRINGE (ML) INJECTION EVERY 12 HOURS SCHEDULED
Status: DISCONTINUED | OUTPATIENT
Start: 2020-07-09 | End: 2020-07-16 | Stop reason: HOSPADM

## 2020-07-09 RX ORDER — ACETAMINOPHEN 325 MG/1
650 TABLET ORAL EVERY 6 HOURS PRN
Status: DISCONTINUED | OUTPATIENT
Start: 2020-07-09 | End: 2020-07-16 | Stop reason: HOSPADM

## 2020-07-09 RX ORDER — POTASSIUM CHLORIDE 7.45 MG/ML
10 INJECTION INTRAVENOUS PRN
Status: DISCONTINUED | OUTPATIENT
Start: 2020-07-09 | End: 2020-07-16 | Stop reason: HOSPADM

## 2020-07-09 RX ORDER — KETOTIFEN FUMARATE 0.35 MG/ML
1 SOLUTION/ DROPS OPHTHALMIC 2 TIMES DAILY
Status: DISCONTINUED | OUTPATIENT
Start: 2020-07-09 | End: 2020-07-16 | Stop reason: HOSPADM

## 2020-07-09 RX ORDER — TRAZODONE HYDROCHLORIDE 100 MG/1
100 TABLET ORAL NIGHTLY
Status: ON HOLD | COMMUNITY
End: 2020-07-16 | Stop reason: HOSPADM

## 2020-07-09 RX ORDER — HYDRALAZINE HYDROCHLORIDE 20 MG/ML
10 INJECTION INTRAMUSCULAR; INTRAVENOUS EVERY 4 HOURS PRN
Status: DISCONTINUED | OUTPATIENT
Start: 2020-07-09 | End: 2020-07-10

## 2020-07-09 RX ORDER — TRIMETHOPRIM 100 MG/1
100 TABLET ORAL 2 TIMES DAILY
Status: DISCONTINUED | OUTPATIENT
Start: 2020-07-09 | End: 2020-07-14

## 2020-07-09 RX ORDER — PANTOPRAZOLE SODIUM 40 MG/10ML
40 INJECTION, POWDER, LYOPHILIZED, FOR SOLUTION INTRAVENOUS DAILY
Status: DISCONTINUED | OUTPATIENT
Start: 2020-07-10 | End: 2020-07-16 | Stop reason: HOSPADM

## 2020-07-09 RX ORDER — POLYETHYLENE GLYCOL 3350 17 G/17G
17 POWDER, FOR SOLUTION ORAL DAILY
Status: ON HOLD | COMMUNITY
End: 2020-07-16 | Stop reason: HOSPADM

## 2020-07-09 RX ORDER — POLYETHYLENE GLYCOL 3350 17 G/17G
17 POWDER, FOR SOLUTION ORAL DAILY PRN
Status: DISCONTINUED | OUTPATIENT
Start: 2020-07-09 | End: 2020-07-16 | Stop reason: HOSPADM

## 2020-07-09 RX ORDER — SODIUM CHLORIDE 0.9 % (FLUSH) 0.9 %
10 SYRINGE (ML) INJECTION PRN
Status: DISCONTINUED | OUTPATIENT
Start: 2020-07-09 | End: 2020-07-16 | Stop reason: HOSPADM

## 2020-07-09 RX ORDER — LISINOPRIL 20 MG/1
20 TABLET ORAL DAILY
COMMUNITY

## 2020-07-09 RX ORDER — ACETAMINOPHEN 650 MG/1
650 SUPPOSITORY RECTAL EVERY 6 HOURS PRN
Status: DISCONTINUED | OUTPATIENT
Start: 2020-07-09 | End: 2020-07-16 | Stop reason: HOSPADM

## 2020-07-09 RX ORDER — SODIUM CHLORIDE, SODIUM LACTATE, POTASSIUM CHLORIDE, CALCIUM CHLORIDE 600; 310; 30; 20 MG/100ML; MG/100ML; MG/100ML; MG/100ML
INJECTION, SOLUTION INTRAVENOUS CONTINUOUS
Status: DISCONTINUED | OUTPATIENT
Start: 2020-07-09 | End: 2020-07-10

## 2020-07-09 RX ORDER — DOCUSATE SODIUM 100 MG/1
100 CAPSULE, LIQUID FILLED ORAL 2 TIMES DAILY
Status: DISCONTINUED | OUTPATIENT
Start: 2020-07-09 | End: 2020-07-16 | Stop reason: HOSPADM

## 2020-07-09 RX ORDER — POTASSIUM CHLORIDE 20 MEQ/1
40 TABLET, EXTENDED RELEASE ORAL PRN
Status: DISCONTINUED | OUTPATIENT
Start: 2020-07-09 | End: 2020-07-16 | Stop reason: HOSPADM

## 2020-07-09 RX ORDER — AMLODIPINE BESYLATE 5 MG/1
5 TABLET ORAL DAILY
Status: ON HOLD | COMMUNITY
End: 2020-07-16 | Stop reason: HOSPADM

## 2020-07-09 RX ORDER — TRIMETHOPRIM 100 MG/1
100 TABLET ORAL 2 TIMES DAILY
Status: ON HOLD | COMMUNITY
End: 2020-07-16 | Stop reason: HOSPADM

## 2020-07-09 RX ADMIN — SODIUM CHLORIDE, POTASSIUM CHLORIDE, SODIUM LACTATE AND CALCIUM CHLORIDE: 600; 310; 30; 20 INJECTION, SOLUTION INTRAVENOUS at 22:13

## 2020-07-09 NOTE — ED PROVIDER NOTES
[facility]  ED  Emergency Department Encounter  EmergencyMedicine Resident     Pt Name:Bd Trauma Spring Concepcion  MRN: [id number]  [place] 1/1/1880  Date of evaluation: 7/9/20  PCP:  Melvina Walsh MD    CHIEF COMPLAINT       No chief complaint on file. HISTORY OF PRESENT ILLNESS  (Location/Symptom, Timing/Onset, Context/Setting, Quality, Duration, Modifying Factors, Severity.)      Bd Trauma Padilla is a 80 y.o. female sent from nursing facility, past history of dementia, further medical history not available due to trauma alias. Unable to get further medical information due to lack of call back from referring facility, [facility] after multiple calls. Patient is a fall 10 days ago, history of subarachnoid, repeat CT head showed resolution of subarachnoid with development of right subdural fluid collection per EMS report, outside images not available to this physician. Patient is reportedly a DNR CC    Patient is complaining of no pain at the moment states that she had a mechanical fall had mild left-sided headache, does not subjectively feel confused nauseous altered or take any blood thinners. PAST MEDICAL / SURGICAL / SOCIAL / FAMILY HISTORY      has no past medical history on file. has no past surgical history on file. Social History     Socioeconomic History    Marital status:       Spouse name: Not on file    Number of children: Not on file    Years of education: Not on file    Highest education level: Not on file   Occupational History    Not on file   Social Needs    Financial resource strain: Not on file    Food insecurity     Worry: Not on file     Inability: Not on file    Transportation needs     Medical: Not on file     Non-medical: Not on file   Tobacco Use    Smoking status: Not on file   Substance and Sexual Activity    Alcohol use: Not on file    Drug use: Not on file    Sexual activity: Not on file   Lifestyle    Physical activity     Days per week: Not on file     Minutes per session: Not on file    Stress: Not on file   Relationships    Social connections     Talks on phone: Not on file     Gets together: Not on file     Attends Faith service: Not on file     Active member of club or organization: Not on file     Attends meetings of clubs or organizations: Not on file     Relationship status: Not on file    Intimate partner violence     Fear of current or ex partner: Not on file     Emotionally abused: Not on file     Physically abused: Not on file     Forced sexual activity: Not on file   Other Topics Concern    Not on file   Social History Narrative    Not on file       No family history on file. Allergies:  Patient has no allergy information on record. Home Medications:  Prior to Admission medications    Not on File       REVIEW OF SYSTEMS    (2-9 systems for level 4, 10 or more for level 5)      Review of Systems   Unable to perform ROS: Dementia   Denies nausea vomiting severe headache, confusion, focal neuro deficit weakness, numbness  Denies shortness of breath cough  Denies chest pain  Denies extremity pain  Mild headache      PHYSICAL EXAM   (up to 7 for level 4, 8 or more for level 5)      INITIAL VITALS:   BP (!) 174/88   Pulse 103   Resp 17   SpO2 97%     Physical Exam  Constitutional:       General: She is not in acute distress. Appearance: She is well-developed. She is not diaphoretic. HENT:      Head: Normocephalic. Comments: Old appearing bruise left frontal scalp     Right Ear: External ear normal.      Left Ear: External ear normal.   Eyes:      General:         Right eye: No discharge. Left eye: No discharge. Conjunctiva/sclera: Conjunctivae normal.      Pupils: Pupils are equal, round, and reactive to light. Neck:      Musculoskeletal: Normal range of motion. Cardiovascular:      Rate and Rhythm: Normal rate and regular rhythm.    Pulmonary:      Effort: No respiratory distress. Breath sounds: No stridor. Musculoskeletal:      Comments: No abrasions, lacerations, deformity   Skin:     General: Skin is warm and dry. Neurological:      Mental Status: She is alert.       Comments: Alert to person, place date not completely the situation, somewhat hard of hearing, moving all extremities 5 out of 5 strength, pupils equal reactive no gaze deficit       DIFFERENTIAL  DIAGNOSIS     PLAN (LABS / IMAGING / EKG):  Orders Placed This Encounter   Procedures    CT Head WO Contrast    XR HUMERUS LEFT (MIN 2 VIEWS)    XR KNEE LEFT (3 VIEWS)    XR CHEST PORTABLE    CBC WITH AUTO DIFFERENTIAL    Protime-INR    Basic Metabolic Panel    Urinalysis    Microscopic Urinalysis    COVID-19    COVID-19    CBC    Protime-INR    Comprehensive Metabolic Panel w/ Reflex to MG    Inpatient consult to Neurosurgery    Inpatient consult to Neurocritical care    Inpatient consult to Hospitalist    Inpatient consult to Internal Medicine    PATIENT STATUS (FROM ED OR OR/PROCEDURAL) Inpatient       MEDICATIONS ORDERED:  Orders Placed This Encounter   Medications    OR Linked Order Group     potassium chloride (KLOR-CON M) extended release tablet 40 mEq     potassium bicarb-citric acid (EFFER-K) effervescent tablet 40 mEq     potassium chloride 10 mEq/100 mL IVPB (Peripheral Line)    magnesium sulfate 1 g in dextrose 5% 100 mL IVPB    OR Linked Order Group     acetaminophen (TYLENOL) tablet 650 mg     acetaminophen (TYLENOL) suppository 650 mg    polyethylene glycol (GLYCOLAX) packet 17 g    OR Linked Order Group     promethazine (PHENERGAN) tablet 12.5 mg     ondansetron (ZOFRAN) injection 4 mg       DIAGNOSTIC RESULTS / EMERGENCY DEPARTMENT COURSE / MDM     LABS:  Results for orders placed or performed during the hospital encounter of 07/09/20   CBC WITH AUTO DIFFERENTIAL   Result Value Ref Range    WBC 5.2 3.5 - 11.3 k/uL    RBC 3.17 (L) 3.95 - 5.11 m/uL Hemoglobin 10.1 (L) 11.9 - 15.1 g/dL    Hematocrit 32.1 (L) 36.3 - 47.1 %    .3 82.6 - 102.9 fL    MCH 31.9 25.2 - 33.5 pg    MCHC 31.5 28.4 - 34.8 g/dL    RDW 14.4 11.8 - 14.4 %    Platelets 695 665 - 220 k/uL    MPV 10.1 8.1 - 13.5 fL    NRBC Automated 0.0 0.0 per 100 WBC    Differential Type NOT REPORTED     Seg Neutrophils 46 36 - 65 %    Lymphocytes 44 (H) 24 - 43 %    Monocytes 8 3 - 12 %    Eosinophils % 1 1 - 4 %    Basophils 1 0 - 2 %    Immature Granulocytes 0 0 %    Segs Absolute 2.37 1.50 - 8.10 k/uL    Absolute Lymph # 2.29 1.10 - 3.70 k/uL    Absolute Mono # 0.40 0.10 - 1.20 k/uL    Absolute Eos # 0.06 0.00 - 0.44 k/uL    Basophils Absolute 0.04 0.00 - 0.20 k/uL    Absolute Immature Granulocyte <0.03 0.00 - 0.30 k/uL    WBC Morphology NOT REPORTED     RBC Morphology NOT REPORTED     Platelet Estimate NOT REPORTED    Protime-INR   Result Value Ref Range    Protime 10.6 9.0 - 12.0 sec    INR 1.0    Basic Metabolic Panel   Result Value Ref Range    Glucose 97 70 - 99 mg/dL    BUN 10 8 - 23 mg/dL    CREATININE 0.74 0.50 - 0.90 mg/dL    Bun/Cre Ratio NOT REPORTED 9 - 20    Calcium 8.8 8.6 - 10.4 mg/dL    Sodium 140 135 - 144 mmol/L    Potassium 4.2 3.7 - 5.3 mmol/L    Chloride 106 98 - 107 mmol/L    CO2 23 20 - 31 mmol/L    Anion Gap 11 9 - 17 mmol/L    GFR Non- NOT REPORTED >60 mL/min    GFR  NOT REPORTED >60 mL/min    GFR Comment NOT REPORTED     GFR Staging NOT REPORTED    Urinalysis   Result Value Ref Range    Color, UA YELLOW YELLOW    Turbidity UA CLEAR CLEAR    Glucose, Ur NEGATIVE NEGATIVE    Bilirubin Urine NEGATIVE NEGATIVE    Ketones, Urine NEGATIVE NEGATIVE    Specific Gravity, UA 1.017 1.005 - 1.030    Urine Hgb NEGATIVE NEGATIVE    pH, UA 5.0 5.0 - 8.0    Protein, UA NEGATIVE NEGATIVE    Urobilinogen, Urine Normal Normal    Nitrite, Urine NEGATIVE NEGATIVE    Leukocyte Esterase, Urine SMALL (A) NEGATIVE    Urinalysis Comments NOT REPORTED    Microscopic Urinalysis   Result Value Ref Range    -          WBC, UA 5 TO 10 0 - 5 /HPF    RBC, UA 0 TO 2 0 - 4 /HPF    Casts UA  0 - 8 /LPF     0 TO 2 HYALINE Reference range defined for non-centrifuged specimen. Crystals, UA NOT REPORTED None /HPF    Epithelial Cells UA 0 TO 2 0 - 5 /HPF    Renal Epithelial, UA NOT REPORTED 0 /HPF    Bacteria, UA NOT REPORTED None    Mucus, UA NOT REPORTED None    Trichomonas, UA NOT REPORTED None    Amorphous, UA NOT REPORTED None    Other Observations UA NOT REPORTED NOT REQ. Yeast, UA NOT REPORTED None   COVID-19   Result Value Ref Range    SARS-CoV-2          SARS-CoV-2, Rapid Not Detected Not Detected    Source . NASOPHARYNGEAL SWAB     SARS-CoV-2, PCR               RADIOLOGY:  XR HUMERUS LEFT (MIN 2 VIEWS)   Final Result   No acute osseous abnormality of the left humerus. No acute osseous abnormality of the left knee. XR KNEE LEFT (3 VIEWS)   Final Result   No acute osseous abnormality of the left humerus. No acute osseous abnormality of the left knee. XR CHEST PORTABLE   Final Result   No acute cardiopulmonary disease. CT Head WO Contrast   Final Result   1. Right-sided subdural fluid collection of 15 mm with some attenuation of   the right lateral ventricle and leftward midline shift of 2.4 mm.      2.  Mild right frontotemporal subarachnoid hemorrhage. 3.  Senescent changes including chronic microvascular change. Critical results were called by Dr. Brooklyn Melendrez MD to [address] [place] [address] on   7/9/2020 at 10:34.            EMERGENCY DEPARTMENT COURSE:  80-year-old woman with history of dementia, fall 10 days ago with interval evolution of subarachnoid, subdural found with small amount of left midline shift, patient is mentating well compared to reported baseline, no obvious focal neuro deficit not nauseous, no vomiting pupils equal and reactive, no obvious blood thinners, trauma and neurosurgery consulted,    Patient had coronavirus in April and a positive test 2 weeks ago a rapid test was done as neurosurgery wanted to offer patient bur holes    Patient admitted to [facility] after consulting multiple other services for admission regardless of negative cover test, neurosurgery to manage, trauma to follow-up and sign off    PROCEDURES:  None    CONSULTS:  IP CONSULT TO NEUROSURGERY  IP CONSULT TO NEUROCRITICAL CARE  IP CONSULT TO HOSPITALIST  IP CONSULT TO INTERNAL MEDICINE  IP CONSULT TO INFECTIOUS DISEASES    CRITICAL CARE:  None    FINAL IMPRESSION      1. SDH (subdural hematoma) ([facility] [address]          DISPOSITION / PLAN     DISPOSITION Admitted 07/09/2020 04:22:47 PM      PATIENT REFERRED TO:  Robert Araya, [address] [place] [address]  [phone]            DISCHARGE MEDICATIONS:  New Prescriptions    No medications on file       Anali You MD  Emergency Medicine Resident    (Please note that portions of thisnote were completed with a voice recognition program.  Efforts were made to edit the dictations but occasionally words are mis-transcribed.)       Anali You MD  Resident  07/09/20 7009

## 2020-07-09 NOTE — H&P
TRAUMA HISTORY AND PHYSICAL EXAMINATION  (V 2.0)    PATIENT NAME: Junior Trauma Padilla  YOB: 1880  MEDICAL RECORD NO. [id number]   DATE: 7/9/2020  PRIMARY CARE PHYSICIAN: No primary care provider on file. ACTIVATION   []Trauma Alert     [x] Trauma Priority     []Trauma Consult. IMPRESSION:     There is no problem list on file for this patient. · Acute on chronic SAH, [facility]    MEDICAL DECISION MAKING AND PLAN:     1. SAH: acute on chronic   -repeat CT head wo contrast  2. COVID +: keep in isolation  3. Pain: multi modal as needed  4. Bowel regimen  5. DVT ppx: hold, pending NS  6. Consults: NS, pending recs  7. I&Os: monitor  8. FU labs  9. Diet: NPO      CONSULT SERVICES    [x] Neurosurgery     [] Orthopedic Surgery    [] Cardiothoracic     [] Facial Trauma    [] Plastic Surgery (Burn)    [] Pediatric Surgery     [] Internal Medicine    [] Pulmonary Medicine    [] Other:        HISTORY:     SOURCE OF INFORMATION  Patient information was obtained from EMS, EMR, Pt  History/Exam limitations: Pt in isolation    INJURY SUMMARY    Acute on chronic SAH, R>L, with L 6mm shift as depicted on CT 7/7/2020    GENERAL DATA  Age 80 y.o.  female   Patient presented to the Emergency Department by ambulance  Injury Date: 7/9/2020   Approximate Injury Time: 2 weeks ago      Transport mode:   [x]Ambulance      [] Helicopter     []Car       [] Other  Referring Hospital: [address] [place] [address],   Specific Details of Location (e.g., bedroom, kitchen, garage): Fall SH 2 wks  Type of Residence (if occurred in home setting) (e.g., apartment, mobile home, single family home): [address]  Fall [place]    HISTORY:   Pt fell two weeks ago. FU CT 7/6 showed SAH. Pt reportedly not acting like self at nursing facility and was brought in today after results of CT returned showing SAH. Positive COVID 2 weeks ago, and in April 2020.      Loss of Consciousness [x]No   []Yes Duration(min)    Total Fluids Given Prior To Arrival unknown cc    MEDICATIONS:   []  None     []  Information not available due to exam limitations documented above  Prior to Admission medications    Not on File       ALLERGIES:   []  None    []   Information not available due to exam limitations documented above   Patient has no allergy information on record. PAST MEDICAL HISTORY: []  None   []   Information not available due to exam limitations documented above    has no past medical history on file. has no past surgical history on file. FAMILY HISTORY   []   Information not available due to exam limitations documented above    family history is not on file. SOCIAL HISTORY  []   Information not available due to exam limitations documented above     has no history on file for tobacco.   has no history on file for alcohol.   has no history on file for drug. PERTINENT SYSTEMIC REVIEW:  []   Information not available due to exam limitations documented above  Negative except for those in HPI      PHYSICAL EXAMINATION:   [address] TO ARRIVAL     []No        []Yes      Variable  Score   Variable  Score  Eye opening [x]Spontaneous 4 Verbal  []Oriented  5     []To voice  3   []Confused  4    []To pain  2   []Inapp words  3    []None  1   []Incomp words 2       []None  1   Motor   [x]Obeys  6    []Localizes pain 5    []Withdraws(pain) 4    []Flexion(pain) 3  []Extension(pain) 2    []None  1     GCS Total = 15    PHYSICAL EXAMINATION  VITAL SIGNS: There were no vitals filed for this visit. General Appearance: elderly female in NAD  Skin: old bruises on left shin  Head: NCAT,old  bruise on left temporal region  Eyes: PERRLA, open spontaneously  ENT: non traumatic  Pulmonary/Chest: non-labored breathing,   Cardiovascular: well perfused, radial and DP +1 b/l  Abdomen: soft, non-tender, non distended  Extremities: Old bruises on left shin.   Musculoskeletal: no gross deformities  Neurologic: baseline decline in MS, A&Ox3, moves all extremities independently    FOCUSED ABDOMINAL SONOGRAM FOR TRAUMA (FAST): none        RADIOLOGY  PLAIN FILMS  Ordered Findings  []CHEST []Normal   [] Preliminary findings  []PELVIS  []Normal   [] Preliminary findings  EXTREMITIES      []RUE []Normal   _____________________    []LUE  []Normal   _____________________    []RLE []Normal   _____________________    []LLE  []Normal   _____________________  []OTHER []Normal   _____________________    CT SCANS  Ordered  [x]HEAD  []Normal   [] Preliminary findings     []CHEST  []Normal   [] Preliminary findings  []ABD/PELVIS[]Normal  [] Preliminary findings[]FACE []Normal   [] Preliminary findings:   []C-SPINE  []Normal   [] Preliminary findings  []T-SPINE  []Normal   [] Preliminary findings  []L-SPINE  []Normal   [] Preliminary findings    []ANGIOGRAPHY  []Normal     [] Preliminary findings  []OTHER          []Normal     [] Preliminary findings:     LABS  Labs Reviewed - No data to display    Radiology:    Xr Humerus Left (min 2 Views)    Result Date: 7/9/2020  No acute osseous abnormality of the left humerus. No acute osseous abnormality of the left knee. Xr Knee Left (3 Views)    Result Date: 7/9/2020  No acute osseous abnormality of the left humerus. No acute osseous abnormality of the left knee. Ct Head Wo Contrast    Result Date: 7/9/2020  1. Right-sided subdural fluid collection of 15 mm with some attenuation of the right lateral ventricle and leftward midline shift of 2.4 mm. 2.  Mild right frontotemporal subarachnoid hemorrhage. 3.  Senescent changes including chronic microvascular change. Critical results were called by Dr. Rose Campuzano MD to [address] on 7/9/2020 at 10:34. Xr Chest Portable    Result Date: 7/9/2020  No acute cardiopulmonary disease.            Kari Cartagena DO  7/9/20, 9:36 AM             Trauma Attending Attestation      I have reviewed the above GCS note(s) and confirmed the key elements of the medical history and physical exam. I have seen and examined the pt. I have discussed the findings, established the care plan and recommendations with Resident, GCS RN, bedside nurse.         Abraham Cruz,   7/14/2020  4:41 PM

## 2020-07-09 NOTE — FLOWSHEET NOTE
07/09/20 1632   Encounter Summary   Services provided to: Patient   Support System Children   Place of [address]   Contact Alevism No   Continue Visiting   (7/9/20)   Complexity of Encounter Low   Length of Encounter 15 minutes   Spiritual Assessment Completed Yes   Routine   Type Follow up   Assessment Calm; Approachable;Coping   Intervention Active listening;Explored feelings, thoughts, concerns;Nurtured hope;Prayer;Sustaining presence/ Ministry of presence; Discussed belief system/Quaker practices/bryant   Outcome Acceptance;Comfort;Expressed gratitude

## 2020-07-09 NOTE — FLOWSHEET NOTE
[facility] [address]     Emergency/Trauma Note    PATIENT NAME: Joycelyn Kong    Shift date: 07/09/2020  Shift day: Thursday   Shift # 1    Room # 19/19   Name: Joycelyn Kong            Age: 80 y.o. Gender: female          Samaritan: No Protestant on file   Place of [place]: Unknown    Trauma/Incident type: Adult Trauma Priority  Admit Date & Time: 7/9/2020  9:28 AM  TRAUMA NAME: Junior Trauma Padilla    PATIENT/EVENT DESCRIPTION:  Bd Trauma [place] is a 80 y.o. female who arrived ED as adult trauma priority. Patient was a transfer from [facility], [facility]. Per report, patient took a fall. Patient to be admitted to 19/19. SPIRITUAL ASSESSMENT/INTERVENTION:  No spiritual assessment was carried because  did not speak to patient. Family was not present at the time.  called patient's nurse at [facility] and she said she notified family.  maintained presence and offered support to staff. PATIENT BELONGINGS:  This  did not handle patient's belongings. ANY BELONGINGS OF SIGNIFICANT VALUE NOTED:  Unknown    REGISTRATION STAFF NOTIFIED? Yes    WHAT IS YOUR SPIRITUAL CARE PLAN FOR THIS PATIENT?:  Follow up visits recommended for spiritual and emotional support. Electronically signed by Fr. Jim Anne on 7/9/2020 at 10:07 AM.  Juanjo Trevino  [phone]       07/09/20 1006   Encounter Summary   Services provided to: Patient   Support System Family members   Continue Visiting   (07/09/2020)   Complexity of Encounter Moderate   Length of Encounter 45 minutes   Routine   Type Initial   Crisis   Type Trauma

## 2020-07-09 NOTE — PROGRESS NOTES
normal   Right foot absent absent normal   Left foot absent absent normal           CONSULTS: Neuro critical care for bur hole decompression of right sided hygroma     PROCEDURES: none    INJURIES:        There is no problem list on file for this patient. Assessment/Plan:     1. Xray areas of discomfort   1. Xray Left humerus   2. Xray Left knee   3. Chest Xray        No further surgery intervention indicated or planned at this time. We will sign off at this time. Please reconsult/call if additional questions, concerns, or issues develop requiring further surgery input. Thank you for this interesting consult.     Electronically signed by Raquel Vivas MD on 7/9/2020 at 1:20 PM

## 2020-07-09 NOTE — TELEPHONE ENCOUNTER
Spoke to nurse at [facility]- informed her that Dr Harley Damico was concerned about CT results and would like her to be seen in the ED.  Informed him to contact ED before taking her in so that they would be aware she is COVID positive

## 2020-07-09 NOTE — ED PROVIDER NOTES
[facility] [address] [facility]     Emergency Department     Faculty Attestation    I performed a history and physical examination of the patient and discussed management with the resident. I reviewed the residents note and agree with the documented findings and plan of care. Any areas of disagreement are noted on the chart. I was personally present for the key portions of any procedures. I have documented in the chart those procedures where I was not present during the key portions. I have reviewed the emergency nurses triage note. I agree with the chief complaint, past medical history, past surgical history, allergies, medications, social and family history as documented unless otherwise noted below. For Physician Assistant/ Nurse Practitioner cases/documentation I have personally evaluated this patient and have completed at least one if not all key elements of the E/M (history, physical exam, and MDM). Additional findings are as noted. This patient was evaluated in the Emergency Department for symptoms described in the history of present illness. He/she was evaluated in the context of the global COVID-19 pandemic, which necessitated consideration that the patient might be at risk for infection with the SARS-CoV-2 virus that causes COVID-19. Institutional protocols and algorithms that pertain to the evaluation of patients at risk for COVID-19 are in a state of rapid change based on information released by regulatory bodies including the CDC and federal and state organizations. These policies and algorithms were followed during the patient's care in the ED. Trauma priority, reported fall at care facility outpatient CT showed subarachnoid and subdural as per report. Patient also reported to be COVID positive both testing positive in April and \"2 to 3 weeks ago\" per transport team.  No fever Per transport team.  On arrival awake alert GCS 15. Normal primary survey. Trauma team at bedside.     PPE: Goggles/Faceshield, N95 mask, gloves        Critical Care     none    Prabhjot Gonsalves MD, Ishan Doty  Attending Emergency  Physician             Prabhjot Gonsalves MD  07/09/20 0702

## 2020-07-09 NOTE — ED NOTES
Home meds updated per [facility] records. Pt talking on phone in room, denies any needs at this time. Will continue to monitor.       Allison Ch RN  07/09/20 3203

## 2020-07-09 NOTE — CONSULTS
Department of Neurosurgery                                            Nurse Practitioner Consult Note      Reason for Consult:  Subdural hematoma  Requesting Physician:  Aparna/Lizbeth  Neurosurgeon:   [] Dr. Charlene Martines  [x] Dr. Tawana Muhammad  [] Dr. Mariusz Garcia  [] Dr. Patricia Chadwick      History Obtained From:  patient, electronic medical record    CHIEF COMPLAINT:         Fall    HISTORY OF PRESENT ILLNESS:       The patient is a 80 y.o. female who presents with Subdural hematoma versus hygroma. Patient's actual chart is under MRN [id number]. From chart review: on 6/20, patient was brought to the hospital after a fall. At that time she was found to have right-sided scattered subarachnoid hemorrhage. She was admitted to the trauma team and had a repeat scan that did not show any worsening of the scan. Patient was then discharged back to her facility. Repeat CT scan on 7/7 showed new right subdural hygroma and new left anterior subdural hematoma chronic. Neurosurgery Dr. Tawana Muhammad was notified of this by radiology. He spoke with the nursing staff at the facility who told him that the patient has been somnolent, occasionally agitated and trying to leave the facility. Neurosurgery attending recommended that the patient be brought to the emergency department for further evaluation and treatment. Patient complains of headache at this time. Patient denies chest pain, shortness of breath, abdominal pain, nausea, vomiting, fever, chills, dizziness, lightheadedness, vision changes, dysuria, hematuria, changes in bowel movements. PAST MEDICAL HISTORY :       Past Medical History:    No past medical history on file. Past Surgical History:    No past surgical history on file. Social History:   Social History     Socioeconomic History    Marital status:       Spouse name: Not on file    Number of children: Not on file    Years of education: Not on file    Highest education level: Not on file   Occupational History    Not on file   Social Needs    Financial resource strain: Not on file    Food insecurity     Worry: Not on file     Inability: Not on file    Transportation needs     Medical: Not on file     Non-medical: Not on file   Tobacco Use    Smoking status: Not on file   Substance and Sexual Activity    Alcohol use: Not on file    Drug use: Not on file    Sexual activity: Not on file   Lifestyle    Physical activity     Days per week: Not on file     Minutes per session: Not on file    Stress: Not on file   Relationships    Social connections     Talks on phone: Not on file     Gets together: Not on file     Attends Congregation service: Not on file     Active member of club or organization: Not on file     Attends meetings of clubs or organizations: Not on file     Relationship status: Not on file    Intimate partner violence     Fear of current or ex partner: Not on file     Emotionally abused: Not on file     Physically abused: Not on file     Forced sexual activity: Not on file   Other Topics Concern    Not on file   Social History Narrative    Not on file       Family History:   No family history on file. Allergies:  Patient has no allergy information on record. Home Medications:  Prior to Admission medications    Not on File       Current Medications:   No current facility-administered medications for this encounter. REVIEW OF SYSTEMS:       CONSTITUTIONAL: negative for fatigue and malaise   EYES: negative for double vision and photophobia    HEENT: negative for tinnitus and sore throat   RESPIRATORY: negative for cough, shortness of breath   CARDIOVASCULAR: negative for chest pain, palpitations   GASTROINTESTINAL: negative for nausea, vomiting   GENITOURINARY: negative for incontinence   MUSCULOSKELETAL: negative for neck or back pain   NEUROLOGICAL: negative for seizures. Positive for headache. PSYCHIATRIC: negative for agitated     Review of systems otherwise negative.     PHYSICAL EXAM:       There were no vitals taken for this visit. See trauma charting for vitals    CONSTITUTIONAL: no apparent distress, appears stated age   HEAD: normocephalic, atraumatic   ENT: moist mucous membranes, uvula midline   NECK: supple, symmetric, no midline tenderness to palpation   BACK: without midline tenderness, step-offs or deformities   LUNGS: clear to auscultation bilaterally   CARDIOVASCULAR: regular rate and rhythm   ABDOMEN: Soft, non-tender, non-distended with normal active bowel sounds   NEUROLOGIC:  EYE OPENING     Spontaneous - 4 [x]       To voice - 3 []       To pain - 2 []       None - 1 []    VERBAL RESPONSE     Appropriate, oriented - 5 [x]       Dazed or confused - 4 []       Syllables, expletives - 3 []       Grunts - 2 []       None - 1 []    MOTOR RESPONSE     Spontaneous, command - 6 [x]       Localizes pain - 5 []       Withdraws pain - 4 []       Abnormal flexion - 3 []       Abnormal extension - 2 []       None - 1 []            Total GCS: 15    Mental Status: Awake, alert, oriented x4.                Cranial Nerves:    cranial nerves II-XII are grossly intact    Motor Exam:    Drift:  absent  Tone:  normal    Motor exam is symmetrical 5 out of 5 all extremities bilaterally   SKIN: no rash       LABS AND IMAGING:     CBC with Differential:    Lab Results   Component Value Date    WBC 5.2 07/09/2020    RBC 3.17 07/09/2020    HGB 10.1 07/09/2020    HCT 32.1 07/09/2020     07/09/2020    .3 07/09/2020    MCH 31.9 07/09/2020    MCHC 31.5 07/09/2020    RDW 14.4 07/09/2020    LYMPHOPCT 44 07/09/2020    MONOPCT 8 07/09/2020    BASOPCT 1 07/09/2020    MONOSABS 0.40 07/09/2020    LYMPHSABS 2.29 07/09/2020    EOSABS 0.06 07/09/2020    BASOSABS 0.04 07/09/2020    DIFFTYPE NOT REPORTED 07/09/2020     BMP:    Lab Results   Component Value Date     07/09/2020    K 4.2 07/09/2020     07/09/2020    CO2 23 07/09/2020    BUN 10 07/09/2020    CREATININE 0.74 07/09/2020 CALCIUM 8.8 07/09/2020    GFRAA NOT REPORTED 07/09/2020    LABGLOM NOT REPORTED 07/09/2020    GLUCOSE 97 07/09/2020       Radiology Review:    CT Head WO Contrast   Preliminary Result   1. Right-sided subdural fluid collection of 15 mm with some attenuation of   the right lateral ventricle and leftward midline shift of 2.4 mm.      2.  Mild right frontotemporal subarachnoid hemorrhage. 3.  Senescent changes including chronic microvascular change. Critical results were called by Dr. Rose Campuzano MD to [address] on   7/9/2020 at 10:34. XR HUMERUS LEFT (MIN 2 VIEWS)    (Results Pending)   XR KNEE LEFT (3 VIEWS)    (Results Pending)   XR CHEST PORTABLE    (Results Pending)           ASSESSMENT AND PLAN:     There is no problem list on file for this patient. A/P:  This is a 89y/o female with Right sided subdural hematoma, likely subacute to chronic, with localized right SAH. Patient care will be discussed with attending, will reevaluate patient along with attending.      - Possible leyla hole procedure to treat headache and reported somnolence; would perform under MAC and local anesthetic   - Discussed procedure with son, who is agreeable to the procedure; Dr. Radha Horan will also discuss with patient's son  - CTLS recommendations: clear  - HOB: 30 degrees   - Neuro checks q2 hours  - Hold all antiplatelets and anticoagulants  - We recommend SBP < 140   - Determine the lower limit of SBP clinically based on mentation  - Admit to internal medicine, COVID unit  - COVID test.      Additional recommendations may follow    Please contact neurosurgery with any changes in patients neurologic status. Thank you for your consult.        Tobi Rodriguez MD  7/9/2020  12:52 PM

## 2020-07-09 NOTE — ED NOTES
Pt found wondering in hallway. Pt moved to room 15 for closer observation.      Yoanna Coto RN  07/09/20 0457

## 2020-07-10 ENCOUNTER — ANESTHESIA (OUTPATIENT)
Dept: OPERATING ROOM | Age: 85
DRG: 025 | End: 2020-07-10
Payer: MEDICARE

## 2020-07-10 ENCOUNTER — ANESTHESIA EVENT (OUTPATIENT)
Dept: OPERATING ROOM | Age: 85
DRG: 025 | End: 2020-07-10
Payer: MEDICARE

## 2020-07-10 ENCOUNTER — APPOINTMENT (OUTPATIENT)
Dept: CT IMAGING | Age: 85
DRG: 025 | End: 2020-07-10
Payer: MEDICARE

## 2020-07-10 VITALS — TEMPERATURE: 97 F | OXYGEN SATURATION: 100 % | SYSTOLIC BLOOD PRESSURE: 95 MMHG | DIASTOLIC BLOOD PRESSURE: 68 MMHG

## 2020-07-10 LAB
ALBUMIN SERPL-MCNC: 3.6 G/DL (ref 3.5–5.2)
ALBUMIN/GLOBULIN RATIO: 1.6 (ref 1–2.5)
ALP BLD-CCNC: 68 U/L (ref 35–104)
ALT SERPL-CCNC: 7 U/L (ref 5–33)
ANION GAP SERPL CALCULATED.3IONS-SCNC: 18 MMOL/L (ref 9–17)
AST SERPL-CCNC: 18 U/L
BILIRUB SERPL-MCNC: 0.37 MG/DL (ref 0.3–1.2)
BUN BLDV-MCNC: 11 MG/DL (ref 8–23)
BUN/CREAT BLD: ABNORMAL (ref 9–20)
CALCIUM SERPL-MCNC: 8.8 MG/DL (ref 8.6–10.4)
CHLORIDE BLD-SCNC: 105 MMOL/L (ref 98–107)
CO2: 19 MMOL/L (ref 20–31)
CREAT SERPL-MCNC: 0.68 MG/DL (ref 0.5–0.9)
EKG ATRIAL RATE: 105 BPM
EKG ATRIAL RATE: 90 BPM
EKG P AXIS: 79 DEGREES
EKG P AXIS: 82 DEGREES
EKG P-R INTERVAL: 148 MS
EKG P-R INTERVAL: 154 MS
EKG Q-T INTERVAL: 334 MS
EKG Q-T INTERVAL: 364 MS
EKG QRS DURATION: 64 MS
EKG QRS DURATION: 68 MS
EKG QTC CALCULATION (BAZETT): 441 MS
EKG QTC CALCULATION (BAZETT): 445 MS
EKG R AXIS: 58 DEGREES
EKG R AXIS: 77 DEGREES
EKG T AXIS: 44 DEGREES
EKG T AXIS: 54 DEGREES
EKG VENTRICULAR RATE: 105 BPM
EKG VENTRICULAR RATE: 90 BPM
FOLATE: 5.7 NG/ML
GFR AFRICAN AMERICAN: >60 ML/MIN
GFR NON-AFRICAN AMERICAN: >60 ML/MIN
GFR SERPL CREATININE-BSD FRML MDRD: ABNORMAL ML/MIN/{1.73_M2}
GFR SERPL CREATININE-BSD FRML MDRD: ABNORMAL ML/MIN/{1.73_M2}
GLUCOSE BLD-MCNC: 105 MG/DL (ref 70–99)
HCT VFR BLD CALC: 33 % (ref 36.3–47.1)
HEMOGLOBIN: 10.5 G/DL (ref 11.9–15.1)
INR BLD: 1
MCH RBC QN AUTO: 31.6 PG (ref 25.2–33.5)
MCHC RBC AUTO-ENTMCNC: 31.8 G/DL (ref 28.4–34.8)
MCV RBC AUTO: 99.4 FL (ref 82.6–102.9)
NRBC AUTOMATED: 0 PER 100 WBC
PARTIAL THROMBOPLASTIN TIME: 23.7 SEC (ref 20.5–30.5)
PDW BLD-RTO: 14.1 % (ref 11.8–14.4)
PLATELET # BLD: 295 K/UL (ref 138–453)
PMV BLD AUTO: 8.8 FL (ref 8.1–13.5)
POTASSIUM SERPL-SCNC: 4.4 MMOL/L (ref 3.7–5.3)
PROTHROMBIN TIME: 10.7 SEC (ref 9–12)
RBC # BLD: 3.32 M/UL (ref 3.95–5.11)
SODIUM BLD-SCNC: 142 MMOL/L (ref 135–144)
TOTAL PROTEIN: 5.9 G/DL (ref 6.4–8.3)
TSH SERPL DL<=0.05 MIU/L-ACNC: 2.03 MIU/L (ref 0.3–5)
VITAMIN B-12: 465 PG/ML (ref 232–1245)
WBC # BLD: 6 K/UL (ref 3.5–11.3)

## 2020-07-10 PROCEDURE — 2580000003 HC RX 258: Performed by: INTERNAL MEDICINE

## 2020-07-10 PROCEDURE — 85027 COMPLETE CBC AUTOMATED: CPT

## 2020-07-10 PROCEDURE — 2000000003 HC NEURO ICU R&B

## 2020-07-10 PROCEDURE — 7100000001 HC PACU RECOVERY - ADDTL 15 MIN: Performed by: NEUROLOGICAL SURGERY

## 2020-07-10 PROCEDURE — 2500000003 HC RX 250 WO HCPCS: Performed by: NEUROLOGICAL SURGERY

## 2020-07-10 PROCEDURE — 6370000000 HC RX 637 (ALT 250 FOR IP): Performed by: REGISTERED NURSE

## 2020-07-10 PROCEDURE — 6370000000 HC RX 637 (ALT 250 FOR IP): Performed by: INTERNAL MEDICINE

## 2020-07-10 PROCEDURE — 6360000002 HC RX W HCPCS: Performed by: REGISTERED NURSE

## 2020-07-10 PROCEDURE — 80053 COMPREHEN METABOLIC PANEL: CPT

## 2020-07-10 PROCEDURE — 2580000003 HC RX 258: Performed by: NURSE ANESTHETIST, CERTIFIED REGISTERED

## 2020-07-10 PROCEDURE — 93005 ELECTROCARDIOGRAM TRACING: CPT | Performed by: STUDENT IN AN ORGANIZED HEALTH CARE EDUCATION/TRAINING PROGRAM

## 2020-07-10 PROCEDURE — 3600000004 HC SURGERY LEVEL 4 BASE: Performed by: NEUROLOGICAL SURGERY

## 2020-07-10 PROCEDURE — 3700000000 HC ANESTHESIA ATTENDED CARE: Performed by: NEUROLOGICAL SURGERY

## 2020-07-10 PROCEDURE — 3600000014 HC SURGERY LEVEL 4 ADDTL 15MIN: Performed by: NEUROLOGICAL SURGERY

## 2020-07-10 PROCEDURE — 7100000000 HC PACU RECOVERY - FIRST 15 MIN: Performed by: NEUROLOGICAL SURGERY

## 2020-07-10 PROCEDURE — 6370000000 HC RX 637 (ALT 250 FOR IP): Performed by: NEUROLOGICAL SURGERY

## 2020-07-10 PROCEDURE — 61154 BURR HOLE W/EVAC&/DRG HMTMA: CPT | Performed by: NEUROLOGICAL SURGERY

## 2020-07-10 PROCEDURE — 6360000002 HC RX W HCPCS: Performed by: NEUROLOGICAL SURGERY

## 2020-07-10 PROCEDURE — 2580000003 HC RX 258: Performed by: ANESTHESIOLOGY

## 2020-07-10 PROCEDURE — 009430Z DRAINAGE OF INTRACRANIAL SUBDURAL SPACE WITH DRAINAGE DEVICE, PERCUTANEOUS APPROACH: ICD-10-PCS | Performed by: NEUROLOGICAL SURGERY

## 2020-07-10 PROCEDURE — 70450 CT HEAD/BRAIN W/O DYE: CPT

## 2020-07-10 PROCEDURE — C1729 CATH, DRAINAGE: HCPCS | Performed by: NEUROLOGICAL SURGERY

## 2020-07-10 PROCEDURE — 3700000001 HC ADD 15 MINUTES (ANESTHESIA): Performed by: NEUROLOGICAL SURGERY

## 2020-07-10 PROCEDURE — 2500000003 HC RX 250 WO HCPCS: Performed by: NURSE ANESTHETIST, CERTIFIED REGISTERED

## 2020-07-10 PROCEDURE — 2709999900 HC NON-CHARGEABLE SUPPLY: Performed by: NEUROLOGICAL SURGERY

## 2020-07-10 PROCEDURE — 2720000010 HC SURG SUPPLY STERILE: Performed by: NEUROLOGICAL SURGERY

## 2020-07-10 PROCEDURE — 99232 SBSQ HOSP IP/OBS MODERATE 35: CPT | Performed by: INTERNAL MEDICINE

## 2020-07-10 PROCEDURE — 2580000003 HC RX 258: Performed by: CLINICAL NURSE SPECIALIST

## 2020-07-10 PROCEDURE — 85730 THROMBOPLASTIN TIME PARTIAL: CPT

## 2020-07-10 PROCEDURE — 2580000003 HC RX 258: Performed by: NEUROLOGICAL SURGERY

## 2020-07-10 PROCEDURE — 6360000002 HC RX W HCPCS: Performed by: NURSE ANESTHETIST, CERTIFIED REGISTERED

## 2020-07-10 PROCEDURE — 82607 VITAMIN B-12: CPT

## 2020-07-10 PROCEDURE — 99222 1ST HOSP IP/OBS MODERATE 55: CPT | Performed by: INTERNAL MEDICINE

## 2020-07-10 PROCEDURE — 84443 ASSAY THYROID STIM HORMONE: CPT

## 2020-07-10 PROCEDURE — 93005 ELECTROCARDIOGRAM TRACING: CPT | Performed by: INTERNAL MEDICINE

## 2020-07-10 PROCEDURE — C1713 ANCHOR/SCREW BN/BN,TIS/BN: HCPCS | Performed by: NEUROLOGICAL SURGERY

## 2020-07-10 PROCEDURE — 2500000003 HC RX 250 WO HCPCS: Performed by: INTERNAL MEDICINE

## 2020-07-10 PROCEDURE — 6370000000 HC RX 637 (ALT 250 FOR IP): Performed by: STUDENT IN AN ORGANIZED HEALTH CARE EDUCATION/TRAINING PROGRAM

## 2020-07-10 PROCEDURE — 85610 PROTHROMBIN TIME: CPT

## 2020-07-10 PROCEDURE — 82746 ASSAY OF FOLIC ACID SERUM: CPT

## 2020-07-10 DEVICE — BURR HOLE COVER PL. W.OPENING W/TAB,20MM
Type: IMPLANTABLE DEVICE | Site: CRANIAL | Status: FUNCTIONAL
Brand: UNIVERSAL NEURO 2

## 2020-07-10 DEVICE — BONE SCREWS, CROSS-PIN, SELF-DRILLING: Type: IMPLANTABLE DEVICE | Site: CRANIAL | Status: FUNCTIONAL

## 2020-07-10 DEVICE — NEURO SCREWS, CROSS-PIN, SELF-DRILLING: Type: IMPLANTABLE DEVICE | Site: CRANIAL | Status: FUNCTIONAL

## 2020-07-10 RX ORDER — SODIUM CHLORIDE, SODIUM LACTATE, POTASSIUM CHLORIDE, CALCIUM CHLORIDE 600; 310; 30; 20 MG/100ML; MG/100ML; MG/100ML; MG/100ML
INJECTION, SOLUTION INTRAVENOUS CONTINUOUS PRN
Status: DISCONTINUED | OUTPATIENT
Start: 2020-07-10 | End: 2020-07-10 | Stop reason: SDUPTHER

## 2020-07-10 RX ORDER — LABETALOL HYDROCHLORIDE 5 MG/ML
10 INJECTION, SOLUTION INTRAVENOUS ONCE
Status: COMPLETED | OUTPATIENT
Start: 2020-07-10 | End: 2020-07-10

## 2020-07-10 RX ORDER — LEVETIRACETAM 5 MG/ML
500 INJECTION INTRAVASCULAR ONCE
Status: COMPLETED | OUTPATIENT
Start: 2020-07-10 | End: 2020-07-10

## 2020-07-10 RX ORDER — LABETALOL HYDROCHLORIDE 5 MG/ML
10 INJECTION, SOLUTION INTRAVENOUS EVERY 4 HOURS PRN
Status: DISCONTINUED | OUTPATIENT
Start: 2020-07-10 | End: 2020-07-11

## 2020-07-10 RX ORDER — SODIUM CHLORIDE 9 MG/ML
INJECTION INTRAVENOUS PRN
Status: DISCONTINUED | OUTPATIENT
Start: 2020-07-10 | End: 2020-07-10 | Stop reason: SDUPTHER

## 2020-07-10 RX ORDER — HYDRALAZINE HYDROCHLORIDE 25 MG/1
25 TABLET, FILM COATED ORAL EVERY 8 HOURS SCHEDULED
Status: DISCONTINUED | OUTPATIENT
Start: 2020-07-10 | End: 2020-07-13

## 2020-07-10 RX ORDER — DEXMEDETOMIDINE HYDROCHLORIDE 4 UG/ML
INJECTION, SOLUTION INTRAVENOUS CONTINUOUS PRN
Status: DISCONTINUED | OUTPATIENT
Start: 2020-07-10 | End: 2020-07-10 | Stop reason: SDUPTHER

## 2020-07-10 RX ORDER — BUTALBITAL, ACETAMINOPHEN AND CAFFEINE 50; 325; 40 MG/1; MG/1; MG/1
1 TABLET ORAL ONCE
Status: COMPLETED | OUTPATIENT
Start: 2020-07-10 | End: 2020-07-10

## 2020-07-10 RX ORDER — BUPIVACAINE HYDROCHLORIDE AND EPINEPHRINE 5; 5 MG/ML; UG/ML
INJECTION, SOLUTION PERINEURAL PRN
Status: DISCONTINUED | OUTPATIENT
Start: 2020-07-10 | End: 2020-07-10 | Stop reason: HOSPADM

## 2020-07-10 RX ORDER — SODIUM CHLORIDE 0.9 % (FLUSH) 0.9 %
10 SYRINGE (ML) INJECTION EVERY 12 HOURS SCHEDULED
Status: DISCONTINUED | OUTPATIENT
Start: 2020-07-10 | End: 2020-07-16 | Stop reason: HOSPADM

## 2020-07-10 RX ORDER — LIDOCAINE HYDROCHLORIDE AND EPINEPHRINE 10; 10 MG/ML; UG/ML
INJECTION, SOLUTION INFILTRATION; PERINEURAL PRN
Status: DISCONTINUED | OUTPATIENT
Start: 2020-07-10 | End: 2020-07-10 | Stop reason: HOSPADM

## 2020-07-10 RX ORDER — SODIUM CHLORIDE 0.9 % (FLUSH) 0.9 %
10 SYRINGE (ML) INJECTION PRN
Status: DISCONTINUED | OUTPATIENT
Start: 2020-07-10 | End: 2020-07-16 | Stop reason: HOSPADM

## 2020-07-10 RX ORDER — PHENYLEPHRINE HYDROCHLORIDE 10 MG/ML
INJECTION INTRAVENOUS PRN
Status: DISCONTINUED | OUTPATIENT
Start: 2020-07-10 | End: 2020-07-10 | Stop reason: SDUPTHER

## 2020-07-10 RX ORDER — MAGNESIUM HYDROXIDE 1200 MG/15ML
LIQUID ORAL CONTINUOUS PRN
Status: COMPLETED | OUTPATIENT
Start: 2020-07-10 | End: 2020-07-10

## 2020-07-10 RX ORDER — LABETALOL HYDROCHLORIDE 5 MG/ML
10 INJECTION, SOLUTION INTRAVENOUS ONCE
Status: DISCONTINUED | OUTPATIENT
Start: 2020-07-10 | End: 2020-07-10

## 2020-07-10 RX ORDER — PROPOFOL 10 MG/ML
INJECTION, EMULSION INTRAVENOUS CONTINUOUS PRN
Status: DISCONTINUED | OUTPATIENT
Start: 2020-07-10 | End: 2020-07-10 | Stop reason: SDUPTHER

## 2020-07-10 RX ORDER — SODIUM CHLORIDE, SODIUM LACTATE, POTASSIUM CHLORIDE, CALCIUM CHLORIDE 600; 310; 30; 20 MG/100ML; MG/100ML; MG/100ML; MG/100ML
INJECTION, SOLUTION INTRAVENOUS CONTINUOUS
Status: DISCONTINUED | OUTPATIENT
Start: 2020-07-10 | End: 2020-07-10

## 2020-07-10 RX ORDER — FENTANYL CITRATE 50 UG/ML
INJECTION, SOLUTION INTRAMUSCULAR; INTRAVENOUS PRN
Status: DISCONTINUED | OUTPATIENT
Start: 2020-07-10 | End: 2020-07-10 | Stop reason: SDUPTHER

## 2020-07-10 RX ADMIN — TRIMETHOPRIM 100 MG: 100 TABLET ORAL at 08:26

## 2020-07-10 RX ADMIN — LATANOPROST 1 DROP: 50 SOLUTION OPHTHALMIC at 21:32

## 2020-07-10 RX ADMIN — SODIUM CHLORIDE, PRESERVATIVE FREE 10 ML: 5 INJECTION INTRAVENOUS at 01:42

## 2020-07-10 RX ADMIN — LISINOPRIL 20 MG: 20 TABLET ORAL at 08:26

## 2020-07-10 RX ADMIN — Medication 1 G: at 20:18

## 2020-07-10 RX ADMIN — ACETAMINOPHEN 650 MG: 325 TABLET ORAL at 17:58

## 2020-07-10 RX ADMIN — PHENYLEPHRINE HYDROCHLORIDE 100 MCG: 10 INJECTION INTRAVENOUS at 15:46

## 2020-07-10 RX ADMIN — LATANOPROST 1 DROP: 50 SOLUTION OPHTHALMIC at 03:14

## 2020-07-10 RX ADMIN — SODIUM CHLORIDE, POTASSIUM CHLORIDE, SODIUM LACTATE AND CALCIUM CHLORIDE: 600; 310; 30; 20 INJECTION, SOLUTION INTRAVENOUS at 14:49

## 2020-07-10 RX ADMIN — AMLODIPINE BESYLATE 5 MG: 5 TABLET ORAL at 08:26

## 2020-07-10 RX ADMIN — PHENYLEPHRINE HYDROCHLORIDE 100 MCG: 10 INJECTION INTRAVENOUS at 16:33

## 2020-07-10 RX ADMIN — HYDRALAZINE HYDROCHLORIDE 25 MG: 25 TABLET, FILM COATED ORAL at 13:11

## 2020-07-10 RX ADMIN — DEXMEDETOMIDINE HYDROCHLORIDE 0.5 MCG/KG/HR: 400 INJECTION INTRAVENOUS at 15:15

## 2020-07-10 RX ADMIN — HYDRALAZINE HYDROCHLORIDE 25 MG: 25 TABLET, FILM COATED ORAL at 20:04

## 2020-07-10 RX ADMIN — DOCUSATE SODIUM 100 MG: 100 CAPSULE, LIQUID FILLED ORAL at 20:04

## 2020-07-10 RX ADMIN — KETOTIFEN FUMARATE 1 DROP: 0.35 SOLUTION/ DROPS OPHTHALMIC at 03:13

## 2020-07-10 RX ADMIN — LEVETIRACETAM 500 MG: 5 INJECTION INTRAVENOUS at 16:13

## 2020-07-10 RX ADMIN — TRIMETHOPRIM 100 MG: 100 TABLET ORAL at 20:04

## 2020-07-10 RX ADMIN — PHENYLEPHRINE HYDROCHLORIDE 100 MCG: 10 INJECTION INTRAVENOUS at 16:09

## 2020-07-10 RX ADMIN — PHENYLEPHRINE HYDROCHLORIDE 100 MCG: 10 INJECTION INTRAVENOUS at 16:02

## 2020-07-10 RX ADMIN — DOCUSATE SODIUM 100 MG: 100 CAPSULE, LIQUID FILLED ORAL at 08:26

## 2020-07-10 RX ADMIN — BUTALBITAL, ACETAMINOPHEN AND CAFFEINE 1 TABLET: 50; 325; 40 TABLET ORAL at 21:00

## 2020-07-10 RX ADMIN — FENTANYL CITRATE 12.5 MCG: 50 INJECTION INTRAMUSCULAR; INTRAVENOUS at 15:03

## 2020-07-10 RX ADMIN — ACETAMINOPHEN 650 MG: 325 TABLET ORAL at 23:15

## 2020-07-10 RX ADMIN — SODIUM CHLORIDE, POTASSIUM CHLORIDE, SODIUM LACTATE AND CALCIUM CHLORIDE: 600; 310; 30; 20 INJECTION, SOLUTION INTRAVENOUS at 14:30

## 2020-07-10 RX ADMIN — PHENYLEPHRINE HYDROCHLORIDE 100 MCG: 10 INJECTION INTRAVENOUS at 15:40

## 2020-07-10 RX ADMIN — KETOTIFEN FUMARATE 1 DROP: 0.35 SOLUTION/ DROPS OPHTHALMIC at 21:30

## 2020-07-10 RX ADMIN — SODIUM CHLORIDE 10 ML: 9 INJECTION INTRAMUSCULAR; INTRAVENOUS; SUBCUTANEOUS at 15:40

## 2020-07-10 RX ADMIN — Medication 10 MG: at 01:43

## 2020-07-10 RX ADMIN — Medication 10 MG: at 04:34

## 2020-07-10 RX ADMIN — SODIUM CHLORIDE, PRESERVATIVE FREE 10 ML: 5 INJECTION INTRAVENOUS at 01:46

## 2020-07-10 RX ADMIN — KETOTIFEN FUMARATE 1 DROP: 0.35 SOLUTION/ DROPS OPHTHALMIC at 08:26

## 2020-07-10 RX ADMIN — PROPOFOL 75 MCG/KG/MIN: 10 INJECTION, EMULSION INTRAVENOUS at 15:34

## 2020-07-10 ASSESSMENT — PULMONARY FUNCTION TESTS
PIF_VALUE: 1
PIF_VALUE: 0
PIF_VALUE: 1
PIF_VALUE: 0
PIF_VALUE: 1
PIF_VALUE: 2
PIF_VALUE: 1
PIF_VALUE: 3
PIF_VALUE: 1
PIF_VALUE: 0
PIF_VALUE: 1
PIF_VALUE: 0
PIF_VALUE: 1
PIF_VALUE: 0
PIF_VALUE: 1

## 2020-07-10 ASSESSMENT — ENCOUNTER SYMPTOMS
COLOR CHANGE: 0
ABDOMINAL DISTENTION: 0
CONSTIPATION: 1
EYE ITCHING: 0
APNEA: 0

## 2020-07-10 ASSESSMENT — PATIENT HEALTH QUESTIONNAIRE - PHQ9: SUM OF ALL RESPONSES TO PHQ QUESTIONS 1-9: 2

## 2020-07-10 ASSESSMENT — PAIN SCALES - GENERAL
PAINLEVEL_OUTOF10: 6
PAINLEVEL_OUTOF10: 3
PAINLEVEL_OUTOF10: 0
PAINLEVEL_OUTOF10: 5

## 2020-07-10 ASSESSMENT — PAIN - FUNCTIONAL ASSESSMENT: PAIN_FUNCTIONAL_ASSESSMENT: 0-10

## 2020-07-10 NOTE — ED NOTES
Report received from [facility].  Pt resting on cot, RR even and unlabored, NAD, call light in reach     MANSOOR Roach  07/09/20 2012

## 2020-07-10 NOTE — H&P
last admission. Patient did test positive for COVID-19 in April 2020. Repeat testing on June 20 revealed another positive test.  She was found to be negative in our emergency department today. There is still concern for possible infection. Patient will be admitted to the [facility] unit for second repeat test.    Past Medical History:     Alzheimer's dementia  Glaucoma  Essential hypertension    Past Surgical History:     Past Surgical History:   Procedure Laterality Date    TONSILLECTOMY          Medications Prior to Admission:     Prior to Admission medications    Medication Sig Start Date End Date Taking? Authorizing Provider   amLODIPine (NORVASC) 5 MG tablet Take 5 mg by mouth daily   Yes Historical Provider, MD   lisinopril (PRINIVIL;ZESTRIL) 20 MG tablet Take 20 mg by mouth daily   Yes Historical Provider, MD   SENNA CO by Combination route   Yes Historical Provider, MD   travoprost, benzalkonium, (TRAVATAN) 0.004 % ophthalmic solution 1 drop nightly   Yes Historical Provider, MD   traZODone (DESYREL) 100 MG tablet Take 100 mg by mouth nightly   Yes Historical Provider, MD   trimethoprim (TRIMPEX) 100 MG tablet Take 100 mg by mouth 2 times daily   Yes Historical Provider, MD   ketotifen (ALAWAY) 0.025 % ophthalmic solution 1 drop 2 times daily   Yes Historical Provider, MD   polyethylene glycol (GLYCOLAX) 17 GM/SCOOP powder Take 17 g by mouth daily   Yes Historical Provider, MD        Allergies:     Bumetanide; Doxycycline; Levaquin [levofloxacin]; Megestrol; Motrin [ibuprofen]; Pcn [penicillins]; and Sulfa antibiotics    Social History:     Tobacco:    reports that she has never smoked. She does not have any smokeless tobacco history on file. Alcohol:      reports previous alcohol use. Drug Use:  has no history on file for drug.     Family History:     Family History   Problem Relation Age of Onset    Diabetes Mother        Review of Systems:     Positive and Negative as described in HPI.    CONSTITUTIONAL:  Reports poor appetite over past 2-3 days; negative for fevers, chills, sweats, fatigue, weight loss  HEENT:  negative for vision, hearing changes, runny nose, throat pain  RESPIRATORY:  negative for shortness of breath, cough, congestion, wheezing  CARDIOVASCULAR:  negative for chest pain, palpitations  GASTROINTESTINAL:  negative for nausea, vomiting, diarrhea, constipation, change in bowel habits, abdominal pain   GENITOURINARY:  negative for difficulty of urination, burning with urination, frequency   INTEGUMENT:  negative for rash, skin lesions, easy bruising   HEMATOLOGIC/LYMPHATIC:  negative for swelling/edema   ALLERGIC/IMMUNOLOGIC:  negative for urticaria , itching  ENDOCRINE:  negative increase in drinking, increase in urination, hot or cold intolerance  MUSCULOSKELETAL:  negative joint pains, muscle aches, swelling of joints  NEUROLOGICAL: reports headache; negative for dizziness, lightheadedness, numbness, pain, tingling extremities  BEHAVIOR/PSYCH:  negative for depression, anxiety    Physical Exam:   BP (!) 162/61   Pulse 104   Temp 98.4 °F (36.9 °C) (Oral)   Resp 19   Ht 5' 1\" (1.549 m)   Wt 97 lb 9.6 oz (44.3 kg)   SpO2 98%   BMI 18.44 kg/m²   Temp (24hrs), Av.4 °F (36.9 °C), Min:98.4 °F (36.9 °C), Max:98.4 °F (36.9 °C)    No results for input(s): POCGLU in the last 72 hours.   No intake or output data in the 24 hours ending 07/10/20 0322    General Appearance: alert, well appearing, and in no acute distress, elderly  female  Mental status: oriented to person, place, and time  Head: normocephalic, atraumatic  Eye: no icterus, redness, pupils equal and reactive, extraocular eye movements intact, conjunctiva clear  Ear: normal external ear, no discharge, hearing intact  Nose: no drainage noted  Mouth: mucous membranes moist  Neck: supple, no carotid bruits, thyroid not palpable  Lungs: Bilateral equal air entry, clear to ausculation, no wheezing, rales or rhonchi, normal effort  Cardiovascular: tachycardic, regular rhythm, no murmur, gallop, rub  Abdomen: Soft, nontender, nondistended, normal bowel sounds, no hepatomegaly or splenomegaly  Neurologic: There are no new focal motor or sensory deficits, normal muscle tone and bulk, no abnormal sensation, normal speech, cranial nerves II through XII grossly intact  Skin: No gross lesions, rashes, bruising or bleeding on exposed skin area  Extremities: peripheral pulses palpable, no pedal edema or calf pain with palpation  Psych: normal affect, somewhat confused at times    Investigations:      Laboratory Testing:  Recent Results (from the past 24 hour(s))   CBC WITH AUTO DIFFERENTIAL    Collection Time: 07/09/20 10:43 AM   Result Value Ref Range    WBC 5.2 3.5 - 11.3 k/uL    RBC 3.17 (L) 3.95 - 5.11 m/uL    Hemoglobin 10.1 (L) 11.9 - 15.1 g/dL    Hematocrit 32.1 (L) 36.3 - 47.1 %    .3 82.6 - 102.9 fL    MCH 31.9 25.2 - 33.5 pg    MCHC 31.5 28.4 - 34.8 g/dL    RDW 14.4 11.8 - 14.4 %    Platelets 302 677 - 524 k/uL    MPV 10.1 8.1 - 13.5 fL    NRBC Automated 0.0 0.0 per 100 WBC    Differential Type NOT REPORTED     Seg Neutrophils 46 36 - 65 %    Lymphocytes 44 (H) 24 - 43 %    Monocytes 8 3 - 12 %    Eosinophils % 1 1 - 4 %    Basophils 1 0 - 2 %    Immature Granulocytes 0 0 %    Segs Absolute 2.37 1.50 - 8.10 k/uL    Absolute Lymph # 2.29 1.10 - 3.70 k/uL    Absolute Mono # 0.40 0.10 - 1.20 k/uL    Absolute Eos # 0.06 0.00 - 0.44 k/uL    Basophils Absolute 0.04 0.00 - 0.20 k/uL    Absolute Immature Granulocyte <0.03 0.00 - 0.30 k/uL    WBC Morphology NOT REPORTED     RBC Morphology NOT REPORTED     Platelet Estimate NOT REPORTED    Protime-INR    Collection Time: 07/09/20 10:43 AM   Result Value Ref Range    Protime 10.6 9.0 - 12.0 sec    INR 1.0    Basic Metabolic Panel    Collection Time: 07/09/20 10:43 AM   Result Value Ref Range    Glucose 97 70 - 99 mg/dL    BUN 10 8 - 23 mg/dL    CREATININE 0.74 0.50 - 0.90 osseous abnormality of the left humerus. No acute osseous abnormality of the left knee. Ct Head Wo Contrast    Result Date: 7/9/2020  1. Right-sided subdural fluid collection of 15 mm with some attenuation of the right lateral ventricle and leftward midline shift of 2.4 mm. 2.  Mild right frontotemporal subarachnoid hemorrhage. 3.  Senescent changes including chronic microvascular change. Critical results were called by Dr. Ira Frances MD to L[facility] Best on 7/9/2020 at 10:34. Xr Chest Portable    Result Date: 7/9/2020  No acute cardiopulmonary disease. Assessment :      Hospital Problems           Last Modified POA    * (Principal) Subdural hematoma ([address] 7/9/2020 Yes    Subarachnoid hemorrhage ([address] 7/9/2020 Yes    Hypertensive urgency 7/9/2020 Yes    Glaucoma 7/9/2020 Yes    Alzheimer disease ([address] 7/9/2020 Yes    Essential hypertension 7/9/2020 Yes    Macrocytic anemia 7/9/2020 Yes    CHI (closed head injury), initial encounter 7/9/2020 Yes          Plan:      Patient status inpatient in the Progressive Unit/Step down    1. Admit as Step Down status in COVID unit to [facility]  Appreciate infectious disease input - transfer out of [facility] ICU  Appreciate neurosurgery input regarding timing of surgery and management of right frontotemporal SAH - planning for leyla hole procedure to relieve right-sided subdural hematoma  COVID-19 positive on April 29 and again on June 20th. Today, she was swabbed in the ED and tested negative. Reswab in AM  Utilize supplemental oxygenation via simple cannula to maintain SpO2 > 90% - patient currently on no supplemental O2  Hypertensive urgency / uncontrolled hypertension - patient on Norvasc and Lisinopril in outpatient setting. Continue home meds.  Labetalol PRN for SBP > 140  Goal SBP per neurosurgery is < 140  HOB elevated > 30 degrees; neurochecks q 2 h  Maintain NPO status until timing of surgery is determined or for possible emergent surgery if patient decompensates  Hold all antiplatelet and anticoagulation medications  SCDs for DVT ppx  Protonix IV for GI ppx in setting of brain bleed  Continue eye drops for glaucoma  Check Vitamin B12, folate, TSH, liver function studies for macrocytic anemia    Consultations:   IP CONSULT TO NEUROSURGERY  IP CONSULT TO NEUROCRITICAL CARE  IP CONSULT TO HOSPITALIST  IP CONSULT TO INTERNAL MEDICINE  IP CONSULT TO INFECTIOUS DISEASES    Patient is admitted as inpatient status because of co-morbidities listed above, severity of signs and symptoms as outlined, requirement for current medical therapies and most importantly because of direct risk to patient if care not provided in a hospital setting.     Lenny Bonds DO  7/10/2020  3:22 AM    Copy sent to Dr. Darlin Monroe MD

## 2020-07-10 NOTE — ED NOTES
Pt states she needs to have a bowel movement. Pt placed on bed pan. Pt states she is unable to use bedpan. Pt placed in a brief.   RR even and unlabored, NAD, call light in reach     Sue Leone RN  07/09/20 2013

## 2020-07-10 NOTE — CONSULTS
[facility] -   Infectious diseases evaluation  admission date 7/9/2020    reason for consultation:   Suspected COVID    Impression :   Current:  · Past covid - this time but tested negative  · Traumatic subdural hematoma  · Traumatic right frontotemporal subarachnoid bleed    Other:  ·   Discussion / summary of stay / plan of care   ·   Recommendations   · Remove COVID isolation  · Okay for bur hole    Infection Control Recommendations   · [place] Precautions    Antimicrobial Stewardship Recommendations   · Simplification of therapy  · Targeted therapy      Coordination ofOutpatient Care:   · Estimated Length of IV antimicrobials:  · Patient will need Midline / picc Catheter Insertion:   · Patient will need SNF:  · Patient will need outpatient wound care:     History of Present Illness:   Initial history: [address] is a 15 y.o.-year-old female 80years old lady who fell at home and was brought in because of a right-sided subdural hematoma and mild right frontotemporal subarachnoid hemorrhage. She did test positive for COVID quite a few weeks ago, she was brought in to be retested and came back negative. Her chest x-ray was negative as well for any acute infiltrates, her white count 5.2 and she had no fever  No cough or phlegm. Neurosurgery on board planning for a bur hole procedure, holding all antiplatelets and anticoagulation. Pt is Ox 3 and very appropriate  Bruises over the right arm and both legs  HA seems better    Interval changes  7/9/2020       Summary of relevant labs:  Labs:  W 5.2  Micro:    Imaging:      I have personally reviewed the past medical history, past surgical history, medications, social history, and family history, and I haveupdated the database accordingly. Past Medical History:   No past medical history on file. Past Surgical  History:   No past surgical history on file.     Medications:      sodium chloride flush  10 mL Intravenous 2 times per day    [START ON 7/10/2020] amLODIPine  5 mg Oral Daily    ketotifen  1 drop Both Eyes BID    [START ON 7/10/2020] lisinopril  20 mg Oral Daily    docusate sodium  100 mg Oral BID    trimethoprim  100 mg Oral BID    latanoprost  1 drop Both Eyes Nightly    sodium chloride flush  10 mL Intravenous 2 times per day    [START ON 7/10/2020] pantoprazole  40 mg Intravenous Daily    And    [START ON 7/10/2020] sodium chloride (PF)  10 mL Intravenous Daily       Social History:     Social History     Socioeconomic History    Marital status:      Spouse name: Not on file    Number of children: Not on file    Years of education: Not on file    Highest education level: Not on file   Occupational History    Not on file   Social Needs    Financial resource strain: Not on file    Food insecurity     Worry: Not on file     Inability: Not on file    Transportation needs     Medical: Not on file     Non-medical: Not on file   Tobacco Use    Smoking status: Not on file   Substance and Sexual Activity    Alcohol use: Not on file    Drug use: Not on file    Sexual activity: Not on file   Lifestyle    Physical activity     Days per week: Not on file     Minutes per session: Not on file    Stress: Not on file   Relationships    Social connections     Talks on phone: Not on file     Gets together: Not on file     Attends Buddhism service: Not on file     Active member of club or organization: Not on file     Attends meetings of clubs or organizations: Not on file     Relationship status: Not on file    Intimate partner violence     Fear of current or ex partner: Not on file     Emotionally abused: Not on file     Physically abused: Not on file     Forced sexual activity: Not on file   Other Topics Concern    Not on file   Social History Narrative    Not on file       Family History:   No family history on file.      Allergies:   Bumetanide; Doxycycline; Levaquin [levofloxacin]; Megestrol; Motrin [ibuprofen]; Pcn [penicillins]; and Sulfa antibiotics     Review of Systems:     Review of Systems   Constitutional: Negative for activity change and appetite change. HENT: Negative for congestion. Eyes: Negative for itching. Respiratory: Negative for apnea. Cardiovascular: Negative for chest pain. Gastrointestinal: Positive for constipation. Negative for abdominal distention. Endocrine: Negative for heat intolerance. Genitourinary: Negative for dysuria. Musculoskeletal: Negative for arthralgias. Skin: Negative for color change. Allergic/Immunologic: Negative for immunocompromised state. Neurological: Negative for dizziness. Hematological: Negative for adenopathy. Psychiatric/Behavioral: Negative for agitation. Physical Examination :     Patient Vitals for the past 8 hrs:   BP Pulse Resp SpO2 Height Weight   07/09/20 2330 -- -- -- -- 5' 1\" (1.549 m) 97 lb 9.6 oz (44.3 kg)   07/09/20 2106 (!) 162/68 104 -- 98 % -- --   07/09/20 1815 (!) 187/86 117 -- 98 % -- --   07/09/20 1804 (!) 164/94 -- -- -- -- --   07/09/20 1736 (!) 164/92 -- 19 98 % -- --   07/09/20 1619 -- 103 17 97 % -- --       Physical Exam  Constitutional:       General: She is not in acute distress. Appearance: Normal appearance. She is normal weight. She is not ill-appearing. HENT:      Head: Normocephalic and atraumatic. Nose: Nose normal.      Mouth/Throat:      Mouth: Mucous membranes are moist.      Pharynx: Oropharynx is clear. Eyes:      General: No scleral icterus. Conjunctiva/sclera: Conjunctivae normal.      Pupils: Pupils are equal, round, and reactive to light. Neck:      Musculoskeletal: Neck supple. No neck rigidity. Cardiovascular:      Rate and Rhythm: Normal rate and regular rhythm. Heart sounds: Normal heart sounds. Pulmonary:      Breath sounds: Normal breath sounds. Abdominal:      General: There is no distension. Palpations:  There is no mass. Tenderness: There is no abdominal tenderness. Genitourinary:     Comments: No fuchs  Musculoskeletal:         General: No swelling or deformity. Skin:     General: Skin is dry. Coloration: Skin is not jaundiced. Neurological:      General: No focal deficit present. Mental Status: She is alert and oriented to person, place, and time. Mental status is at baseline. Psychiatric:         Mood and Affect: Mood normal.         Thought Content: Thought content normal.           Medical Decision Making:   I have independently reviewed/ordered the following labs:    CBC with Differential:   Recent Labs     07/09/20  1043   WBC 5.2   HGB 10.1*   HCT 32.1*      LYMPHOPCT 44*   MONOPCT 8     BMP:  Recent Labs     07/09/20  1043      K 4.2      CO2 23   BUN 10   CREATININE 0.74     Hepatic Function Panel: No results for input(s): PROT, LABALBU, BILIDIR, IBILI, BILITOT, ALKPHOS, ALT, AST in the last 72 hours. No results for input(s): RPR in the last 72 hours. No results for input(s): HIV in the last 72 hours. No results for input(s): BC in the last 72 hours. Lab Results   Component Value Date    CREATININE 0.74 07/09/2020    GLUCOSE 97 07/09/2020       Detailed results: Thank you for allowing us to participate in the care of this patient. Please call with questions. This note is created with the assistance of a speech recognition program.  While intending to generate adocument that actually reflects the content of the visit, the document can still have some errors including those of syntax and sound a like substitutions which may escape proof reading. It such instances, actual meaningcan be extrapolated by contextual diversion.     Carlitos Yuan MD  Office: [phone]  Perfect serve / office [phone]

## 2020-07-10 NOTE — ED PROVIDER NOTES
[facility] ED  Emergency Department  Faculty Sign-Out Addendum     Care of Bd Trauma Kanwal Nicole was assumed from previous attending and is being seen for No chief complaint on file. .  The patient's initial evaluation and plan have been discussed with the prior provider who initially evaluated the patient. Attestation    I was available and discussed any additional care issues that arose and coordinated the management plans with the resident(s) caring for the patient during my duty period. Any areas of disagreement with residents documentation of care or procedures are noted on the chart. I was personally present for the key portions of any/all procedures during my duty period. I have documented in the chart those procedures where I was not present during the key portions.     EMERGENCY DEPARTMENT COURSE / MEDICAL DECISION MAKING:       MEDICATIONS GIVEN:  Orders Placed This Encounter   Medications    OR Linked Order Group     potassium chloride (KLOR-CON M) extended release tablet 40 mEq     potassium bicarb-citric acid (EFFER-K) effervescent tablet 40 mEq     potassium chloride 10 mEq/100 mL IVPB (Peripheral Line)    magnesium sulfate 1 g in dextrose 5% 100 mL IVPB    OR Linked Order Group     acetaminophen (TYLENOL) tablet 650 mg     acetaminophen (TYLENOL) suppository 650 mg    polyethylene glycol (GLYCOLAX) packet 17 g    OR Linked Order Group     promethazine (PHENERGAN) tablet 12.5 mg     ondansetron (ZOFRAN) injection 4 mg    lactated ringers infusion       LABS / RADIOLOGY:     Labs Reviewed   CBC WITH AUTO DIFFERENTIAL - Abnormal; Notable for the following components:       Result Value    RBC 3.17 (*)     Hemoglobin 10.1 (*)     Hematocrit 32.1 (*)     Lymphocytes 44 (*)     All other components within normal limits   URINALYSIS - Abnormal; Notable for the following components:    Leukocyte Esterase, Urine SMALL (*)     All other components within normal limits   PROTIME-INR   BASIC METABOLIC PANEL   MICROSCOPIC URINALYSIS   COVID-19   COVID-19   CBC   PROTIME-INR   COMPREHENSIVE METABOLIC PANEL W/ REFLEX TO MG FOR LOW K       Xr Humerus Left (min 2 Views)    Result Date: 7/9/2020  EXAMINATION: TWO XRAY VIEWS OF THE LEFT HUMERUS; THREE XRAY VIEWS OF THE LEFT KNEE 7/9/2020 2:39 pm COMPARISON: None. HISTORY: ORDERING SYSTEM PROVIDED HISTORY: pain on TERT exam TECHNOLOGIST PROVIDED HISTORY: pain on TERT exam Reason for Exam: fall 2 weeks ago/ pain Acuity: Acute Type of Exam: Initial; ORDERING SYSTEM PROVIDED HISTORY: Pain on TERT exam TECHNOLOGIST PROVIDED HISTORY: Pain on TERT exam Reason for Exam: fall 2 weeks ago/ pain Acuity: Acute Type of Exam: Initial FINDINGS: Left humerus, two views: AP and lateral views demonstrate no acute fracture or dislocation. The shoulder joint is intact as visualized. No focal soft tissue abnormality. Left knee, three views: No acute fracture or dislocation. Mild narrowing of the lateral compartment of the tibiofemoral joint. No significant osteoarthritic spurring. No joint effusion. Probable osteochondroma arising from the proximal tibial metaphysis laterally. No acute osseous abnormality of the left humerus. No acute osseous abnormality of the left knee. Xr Knee Left (3 Views)    Result Date: 7/9/2020  EXAMINATION: TWO XRAY VIEWS OF THE LEFT HUMERUS; THREE XRAY VIEWS OF THE LEFT KNEE 7/9/2020 2:39 pm COMPARISON: None. HISTORY: ORDERING SYSTEM PROVIDED HISTORY: pain on TERT exam TECHNOLOGIST PROVIDED HISTORY: pain on TERT exam Reason for Exam: fall 2 weeks ago/ pain Acuity: Acute Type of Exam: Initial; ORDERING SYSTEM PROVIDED HISTORY: Pain on TERT exam TECHNOLOGIST PROVIDED HISTORY: Pain on TERT exam Reason for Exam: fall 2 weeks ago/ pain Acuity: Acute Type of Exam: Initial FINDINGS: Left humerus, two views: AP and lateral views demonstrate no acute fracture or dislocation. The shoulder joint is intact as visualized. and leftward midline shift of 2.4 mm. 2.  Mild right frontotemporal subarachnoid hemorrhage. 3.  Senescent changes including chronic microvascular change. Critical results were called by Dr. Eyal Fabian MD to Lai Best on 7/9/2020 at 10:34. Xr Chest Portable    Result Date: 7/9/2020  EXAMINATION: ONE XRAY VIEW OF THE CHEST 7/9/2020 2:39 pm COMPARISON: None. HISTORY: ORDERING SYSTEM PROVIDED HISTORY: Pain on palpation of sternum on TERT exam TECHNOLOGIST PROVIDED HISTORY: Pain on palpation of sternum on TERT exam Reason for Exam: Fall 2 weeks ago/ sternal pain Acuity: Acute Type of Exam: Initial FINDINGS: No lines or tubes. Normal cardiomediastinal silhouette. The lungs are clear without focal consolidation or pleural effusion. No suspicious pulmonary nodules. No pulmonary edema. No pneumothorax. No acute osseous abnormality. No acute cardiopulmonary disease. RECENT VITALS:      ,  Pulse: 104, Resp: 19, BP: (!) 162/68, SpO2: 98 %    Bd Trauma Padilla is a 80 y.o. female who presents with complaint of fall recently at [facility]. Subarachnoid and subdural seen on out side facility CT. Trauma consulted neurosurgery consulted and is planning for operative management. Admission. Neurologically stable with no evidence of deficits. DNR CC.    OUTSTANDING TASKS / RECOMMENDATIONS:    1.  Disposition made by previous attending and nothing to do      Ty العلي MD, [facility]  Attending Emergency Physician  [address] ED       Michele Bales MD  07/09/20 6253

## 2020-07-10 NOTE — ED NOTES
Pt up to bathroom in wheelchair with RN assist, pt had small BM of brown formed stool, voided clear yellow urine, pt placed back into bed, cranberry juice given per request, continue to monitor     Jonas Whitman RN  07/09/20 4188

## 2020-07-10 NOTE — ANESTHESIA POSTPROCEDURE EVALUATION
Department of Anesthesiology  Postprocedure Note    Patient: Tia Pemberton  MRN: [id number]  YOB: 1926  Date of evaluation: 7/10/2020  Time:  6:27 PM     Procedure Summary     Date:  07/10/20 Room / Location:  [address]    Anesthesia Start:  7193 Anesthesia Stop:  8760    Procedure:  Igor Span (Right Head) Diagnosis:  (ADD-ON)    Surgeon:  Kristi Bertrand DO Responsible Provider:  Sruthi Mayers MD    Anesthesia Type:  MAC ASA Status:  3          Anesthesia Type: MAC    Joshua Phase I:      Joshua Phase II:      Last vitals: Reviewed and per EMR flowsheets.        Anesthesia Post Evaluation    Patient location during evaluation: PACU  Patient participation: complete - patient participated  Level of consciousness: awake  Pain score: 0  Airway patency: patent  Nausea & Vomiting: no vomiting and no nausea  Complications: no  Cardiovascular status: hemodynamically stable  Respiratory status: acceptable  Hydration status: stable

## 2020-07-10 NOTE — ANESTHESIA PRE PROCEDURE
potassium bicarb-citric acid (EFFER-K) effervescent tablet 40 mEq  40 mEq Oral PRN Saralee Primer, APRN - CNS        Or    potassium chloride 10 mEq/100 mL IVPB (Peripheral Line)  10 mEq Intravenous PRN Saralee Primer, APRN - CNS        magnesium sulfate 1 g in dextrose 5% 100 mL IVPB  1 g Intravenous PRN Saralee Primer, APRN - CNS        acetaminophen (TYLENOL) tablet 650 mg  650 mg Oral Q6H PRN Saralee Primer, APRN - CNS        Or    acetaminophen (TYLENOL) suppository 650 mg  650 mg Rectal Q6H PRN Saralee Primer, APRN - CNS        polyethylene glycol (GLYCOLAX) packet 17 g  17 g Oral Daily PRN Saralee Primer, APRN - CNS        promethazine (PHENERGAN) tablet 12.5 mg  12.5 mg Oral Q6H PRN Saralee Primer, APRN - CNS        Or    ondansetron (ZOFRAN) injection 4 mg  4 mg Intravenous Q6H PRN Saralee Primer, APRN - CNS        amLODIPine (NORVASC) tablet 5 mg  5 mg Oral Daily Lizabeth Hodgkin Haugetonya, DO   5 mg at 07/10/20 0826    ketotifen (ZADITOR) 0.025 % ophthalmic solution 1 drop  1 drop Both Eyes BID Lizabeth Hodgkin Haugetonya, DO   1 drop at 07/10/20 0826    lisinopril (PRINIVIL;ZESTRIL) tablet 20 mg  20 mg Oral Daily Lizabeth Hodgkin Haugetonya, DO   20 mg at 07/10/20 9304    docusate sodium (COLACE) capsule 100 mg  100 mg Oral BID Lizabeth Hodgkin Haugetonya, DO   100 mg at 07/10/20 0826    trimethoprim (TRIMPEX) tablet 100 mg  100 mg Oral BID Lizabeth Hodgkin Hauger, DO   100 mg at 07/10/20 0826    latanoprost (XALATAN) 0.005 % ophthalmic solution 1 drop  1 drop Both Eyes Nightly Deidrath Hodgkin Haevens, DO   1 drop at 07/10/20 0314    sodium chloride flush 0.9 % injection 10 mL  10 mL Intravenous 2 times per day Deidra Hodgkin Haevens, DO   10 mL at 07/10/20 0142    sodium chloride flush 0.9 % injection 10 mL  10 mL Intravenous PRN Deidra Hodgkin Hauger, DO        pantoprazole (PROTONIX) injection 40 mg  40 mg Intravenous Daily Lizabeth Hodgkin Hauger, DO   Stopped at 07/10/20 1255    And    sodium chloride (PF) 0.9 %  07/10/2020    K 4.4 07/10/2020     07/10/2020    CO2 19 07/10/2020    BUN 11 07/10/2020    CREATININE 0.68 07/10/2020    GFRAA >60 07/10/2020    LABGLOM >60 07/10/2020    GLUCOSE 105 07/10/2020    PROT 5.9 07/10/2020    CALCIUM 8.8 07/10/2020    BILITOT 0.37 07/10/2020    ALKPHOS 68 07/10/2020    AST 18 07/10/2020    ALT 7 07/10/2020       POC Tests: No results for input(s): POCGLU, POCNA, POCK, POCCL, POCBUN, POCHEMO, POCHCT in the last 72 hours. Coags:   Lab Results   Component Value Date    PROTIME 10.7 07/10/2020    INR 1.0 07/10/2020    APTT 23.7 07/10/2020       HCG (If Applicable): No results found for: PREGTESTUR, PREGSERUM, HCG, HCGQUANT     ABGs: No results found for: PHART, PO2ART, DLH9YCE, OMA1JPT, BEART, X9JSNLZO     Type & Screen (If Applicable):  No results found for: LABABO, LABRH    Drug/Infectious Status (If Applicable):  No results found for: HIV, HEPCAB    COVID-19 Screening (If Applicable):   Lab Results   Component Value Date    COVID19 Not Detected 07/09/2020         Anesthesia Evaluation  Patient summary reviewed no history of anesthetic complications:   Airway: Mallampati: II  TM distance: >3 FB   Neck ROM: full  Mouth opening: > = 3 FB Dental:          Pulmonary:Negative Pulmonary ROS and normal exam                               Cardiovascular:    (+) hypertension: no interval change,       ECG reviewed  Rhythm: regular  Rate: normal                    Neuro/Psych:   (+) psychiatric history:depression/anxiety             GI/Hepatic/Renal: Neg GI/Hepatic/Renal ROS            Endo/Other: Negative Endo/Other ROS                    Abdominal:           Vascular: negative vascular ROS. Anesthesia Plan      MAC     ASA 3       Induction: intravenous. Anesthetic plan and risks discussed with patient. Use of blood products discussed with patient whom consented to blood products. Plan discussed with CRNA.                   Dyana Abbasi Lidia Iraheta MD   7/10/2020

## 2020-07-10 NOTE — PROGRESS NOTES
COVID test is negative, CXR negative for pneumonia, please move her out of the COVID isolation out of the covid floors    Iris Espinal MD. Infectious Diseases

## 2020-07-10 NOTE — PROGRESS NOTES
Occupational Therapy Not Seen Note    DATE: 7/10/2020  Name: Macie Sun  : 1926  MRN: [id number]    Patient not available for Occupational Therapy due to:     Other: Per RN, pt hypotensive this AM. To remain in bed     Next Scheduled Treatment: Re-check 2020    Electronically signed by MADDY Brown on 7/10/2020 at 2:52 PM

## 2020-07-10 NOTE — PROGRESS NOTES
Shereen Ray 19    Progress Note    7/10/2020    10:25 AM    Name:   Betina Ruiz  MRN:     [id number]     [place]:      [de-identified]   Room:   [address] [place] [address] Day:  1  Admit Date:  7/9/2020  9:28 AM    PCP:   Jordyn Locke MD  Code Status:  DNR-CC    Subjective:     C/C: Rule out COVID    Interval History Status: not changed. Patient seen and examined  Patient denies cough shortness of breath or chest pain  Patient denies fever chest pain shortness of breath  I am seeing the patient for rule out CO VID 19    Brief History:     Betina Ruiz is a 79 y/o female (actual name is Betina Ruiz, MRN [id number]) who presents to [facility] for evaluation  after repeat CT scan revealed new findings on 7/7.      Patient sustained a fall on 6/20/2020 and was brought to the hospital for further evaluation. At that time, patient was found to have a right-sided subarachnoid hemorrhage. Repeat imaging revealed no worsening of her  hemorrhage and she was discharged back to her ECF. She had a repeat scan performed on 7/7. Per the neurosurgical resident's note, this revealed a new right subdural hygroma and new left anterior subdural hematoma. Neurosurgery was  alerted of the patient's new findings on imaging. Per discussion with nursing staff at her ECF, patient has been somewhat confused, trying to leave her facility, and has been somnolent as well. She was brought to our emergency department today for repeat imaging. Today CT of the brain reveals a right-sided subdural fluid collection of 15 mm with some attenuation of the right lateral ventricle and leftward midline shift of 2.4 mm. She is does have persistent right frontal temporal subarachnoid hemorrhage. The patient has been evaluated by neurosurgery and trauma services. Plans are currently being discussed regarding treatment options. Laboratory studies were unremarkable.     Of note, patient was a DNR CC during her last admission.       Patient did test positive for COVID-19 in 2020. Repeat testing on  revealed another positive test.  She was found to be negative in our emergency department today. There is still concern for possible infection. Patient will be admitted to the [facility] unit for second repeat test.    Medications: Allergies: Allergies   Allergen Reactions    Bumetanide     Doxycycline     Levaquin [Levofloxacin]     Megestrol     Motrin [Ibuprofen]     Pcn [Penicillins]     Sulfa Antibiotics        Current Meds:   Scheduled Meds:    sodium chloride flush  10 mL Intravenous 2 times per day    amLODIPine  5 mg Oral Daily    ketotifen  1 drop Both Eyes BID    lisinopril  20 mg Oral Daily    docusate sodium  100 mg Oral BID    trimethoprim  100 mg Oral BID    latanoprost  1 drop Both Eyes Nightly    sodium chloride flush  10 mL Intravenous 2 times per day    pantoprazole  40 mg Intravenous Daily    And    sodium chloride (PF)  10 mL Intravenous Daily     Continuous Infusions:   PRN Meds: labetalol, sodium chloride flush, potassium chloride **OR** potassium alternative oral replacement **OR** potassium chloride, magnesium sulfate, acetaminophen **OR** acetaminophen, polyethylene glycol, promethazine **OR** ondansetron, sodium chloride flush    Data:     Past Medical History:   has a past medical history of Dementia ([address] Essential hypertension, and Glaucoma. Social History:   reports that she has never smoked. She does not have any smokeless tobacco history on file. She reports previous alcohol use.      Family History:   Family History   Problem Relation Age of Onset    Diabetes Mother        Vitals:  BP (!) 176/64   Pulse 95   Temp 97.9 °F (36.6 °C) (Oral)   Resp 20   Ht 5' 1\" (1.549 m)   Wt 97 lb 9.6 oz (44.3 kg)   SpO2 99%   BMI 18.44 kg/m²   Temp (24hrs), Av.2 °F (36.8 °C), Min:97.9 °F (36.6 °C), Max:98.4 °F (36.9 °C)    No results for input(s): POCGLU in the last 72 hours. I/O (24Hr): No intake or output data in the 24 hours ending 07/10/20 1025    Labs:  Hematology:  Recent Labs     07/09/20  1043 07/10/20  0444   WBC 5.2 6.0   RBC 3.17* 3.32*   HGB 10.1* 10.5*   HCT 32.1* 33.0*   .3 99.4   MCH 31.9 31.6   MCHC 31.5 31.8   RDW 14.4 14.1    295   MPV 10.1 8.8   INR 1.0 1.0     Chemistry:  Recent Labs     07/09/20  1043 07/10/20  0444    142   K 4.2 4.4    105   CO2 23 19*   GLUCOSE 97 105*   BUN 10 11   CREATININE 0.74 0.68   ANIONGAP 11 18*   LABGLOM NOT REPORTED >60   GFRAA NOT REPORTED >60   CALCIUM 8.8 8.8     Recent Labs     07/10/20  0444   PROT 5.9*   LABALBU 3.6   TSH 2.03   AST 18   ALT 7   ALKPHOS 68   BILITOT 0.37     ABG:No results found for: POCPH, PHART, PH, POCPCO2, KQT0JWJ, PCO2, POCPO2, PO2ART, PO2, POCHCO3, JJV8JQX, HCO3, NBEA, PBEA, BEART, BE, THGBART, THB, JBX1VVA, RNJS2VEC, A8JTYFGD, O2SAT, FIO2  No results found for: SPECIAL  No results found for: CULTURE    Radiology:  Xr Humerus Left (min 2 Views)    Result Date: 7/9/2020  No acute osseous abnormality of the left humerus. No acute osseous abnormality of the left knee. Xr Knee Left (3 Views)    Result Date: 7/9/2020  No acute osseous abnormality of the left humerus. No acute osseous abnormality of the left knee. Ct Head Wo Contrast    Result Date: 7/9/2020  1. Right-sided subdural fluid collection of 15 mm with some attenuation of the right lateral ventricle and leftward midline shift of 2.4 mm. 2.  Mild right frontotemporal subarachnoid hemorrhage. 3.  Senescent changes including chronic microvascular change. Critical results were called by Dr. Moises Good MD to [address] on 7/9/2020 at 10:34. Xr Chest Portable    Result Date: 7/9/2020  No acute cardiopulmonary disease.        Physical Examination:        General appearance:  alert  Lungs:  clear to auscultation bilaterally, normal effort  Heart:  regular rate and rhythm, no murmur  Abdomen:  soft, nontender, nondistended, normal bowel sounds, no masses, hepatomegaly, splenomegaly  Extremities:  no edema, redness, tenderness in the calves  Skin:  no gross lesions, rashes, induration    Assessment:        Hospital Problems           Last Modified POA    * (Principal) Subdural hematoma ([address] 7/9/2020 Yes    Subarachnoid hemorrhage ([address] 7/9/2020 Yes    Hypertensive urgency 7/9/2020 Yes    Glaucoma 7/9/2020 Yes    Alzheimer disease ([address] 7/9/2020 Yes    Essential hypertension 7/9/2020 Yes    Macrocytic anemia 7/9/2020 Yes    CHI (closed head injury), initial encounter 7/9/2020 Yes          Plan:        Principal Problem:    Subdural hematoma ([address] neurosurgery following  Active Problems:      Hypertensive urgency  hydralazine was added      Glaucoma continue home medication       Alzheimer disease ([address] monitor        Macrocytic anemia work-up as outpatient transfuse if hemoglobin below 7    Recent CO VID 19 infection resolved        Carlee Colon MD  7/10/2020  10:25 AM

## 2020-07-10 NOTE — ED NOTES
Pt repositioned for comfort, updated with bed rest, warm blankets given, pt updated with admission plans, perineal care completed     [facility]  07/09/20 0079

## 2020-07-10 NOTE — PROGRESS NOTES
Physical Therapy  DATE: 7/10/2020    NAME: Shari Chatterjee  MRN: [id number]   : 1926    Patient not seen this date for Physical Therapy due to:  [] Blood transfusion in progress  [] Hemodialysis  []  Patient Declined  [] Spine Precautions   [] Strict Bedrest  [] Surgery/ Procedure  [] Testing      [x] Other--hypotensive; RN requested to hold PT this date        [] PT being discontinued at this time. Patient independent. No further needs. [] PT being discontinued at this time as the patient has been transferred to palliative care. No further needs.     Miracle Avitia, PT

## 2020-07-10 NOTE — ED NOTES
Report given to [facility], RN.  All questions answered     Deejay Nur RN  07/09/20 6834

## 2020-07-10 NOTE — BRIEF OP NOTE
Brief Postoperative Note      Patient: Tia Pemberton  YOB: 1926  MRN: [id number]    Date of Procedure: 7/10/2020    Pre-Op Diagnosis: subdural hygroma vs chronic subdural hematoma    Post-Op Diagnosis: subdural hygroma       Procedure(s):  Right leyla hole, placement of subdural drain    Surgeon(s):  Kristi Bertrand DO    Assistant:  Surgical Assistant: Brady Rivera    Anesthesia: Monitor Anesthesia Care    Estimated Blood Loss (mL): minimal     None  Complications:     Specimens:   * No specimens in log *    Implants:  Implant Name Type Inv. Item Serial No.  Lot No. LRB No. Used Action   SCREW SD 1.5X4.0MM MUST ORDER BY 5s Screw/Plate/Nail/Sixto SCREW SD 1.5X4.0MM MUST ORDER BY 5s  IKE: ORTHOPAEDICS  Right 2 Implanted   SCREW SELF DRILLING 1.5X5MM MIN ORDER 5 Screw/Plate/Nail/Sixto SCREW SELF DRILLING 1.5X5MM MIN ORDER 5  IKE: ORTHOPAEDICS  Right 1 Implanted   COVER PLT LEYLA HOLE 20MM Screw/Plate/Nail/Sixto COVER PLT LEYLA HOLE 20MM  IKE: ORTHOPAEDICS  Right 1 Implanted         Drains:   Urethral Catheter Double-lumen 16 fr (Active)   Catheter Indications Perioperative use in selected surgeries including but not limited to urologic, pelvic or need for intraoperative monitoring of urinary output due to prolonged surgery, large volume infusion or need for diuretic therapy in surgery; Need for fluid management in critically ill patients in a critical care setting not able to be managed by other means such as BSC with hat, bedpan, urinal, condom catheter, or short term intermittent urethral catherization 7/10/2020  4:47 PM   Site Assessment No urethral drainage 7/10/2020  4:47 PM   Urine Color Yellow 7/10/2020  4:47 PM   Urine Appearance Clear 7/10/2020  4:47 PM       Ventricular Device Ventricular drainage catheter Right (Active)       Findings: subdural hygroma under pressure    Electronically signed by Kristi Bertrand DO on 7/10/2020 at 5:20 PM

## 2020-07-10 NOTE — PROGRESS NOTES
[facility]  Speech Language Pathology    Date: 7/10/2020  Patient Name: Diego Jama  YOB: 1926   AGE: 80 y.o.   MRN: [id number]        Patient Not Available for Speech Therapy     Due to:  [] Testing  [] Hemodialysis  [] Cancelled by RN  [x] Surgery   [] Intubation/Sedation/Pain Medication  [] Medical instability  [] Other:    Next scheduled treatment:  7/11/2020  Completed by: Chloe Arenas M.S., CF-SLP

## 2020-07-10 NOTE — FLOWSHEET NOTE
Assessment: Patient was awake but a bit confused and restless when  visited. Family was not present the time. When asked how she was feeling, patient responded; \"okay. Patient was raised [employer]. Intervention:  offered support and prayed with patient. Patient received sacrament of anointing of the sick. Outcome: Patient was very appreciative of the anointing she received. Follow up visits recommended for more prayers and support.        07/10/20 1215   Encounter Summary   Services provided to: Patient   Support System Family members   Continue Visiting   (07/10/2020)   Complexity of Encounter Moderate   Length of Encounter 15 minutes   Spiritual Assessment Completed Yes   Routine   Type Follow up   Spiritual/Synagogue   Type Spiritual support   Assessment Approachable   Intervention Active listening;Prayer; Anointing   Outcome Expressed gratitude   Sacraments   Sacrament of Sick-Anointing Anointed

## 2020-07-11 LAB
ABSOLUTE EOS #: 0.06 K/UL (ref 0–0.44)
ABSOLUTE IMMATURE GRANULOCYTE: 0.04 K/UL (ref 0–0.3)
ABSOLUTE LYMPH #: 1.73 K/UL (ref 1.1–3.7)
ABSOLUTE MONO #: 0.87 K/UL (ref 0.1–1.2)
ANION GAP SERPL CALCULATED.3IONS-SCNC: 15 MMOL/L (ref 9–17)
BASOPHILS # BLD: 1 % (ref 0–2)
BASOPHILS ABSOLUTE: 0.04 K/UL (ref 0–0.2)
BUN BLDV-MCNC: 9 MG/DL (ref 8–23)
BUN/CREAT BLD: ABNORMAL (ref 9–20)
CALCIUM SERPL-MCNC: 8.4 MG/DL (ref 8.6–10.4)
CHLORIDE BLD-SCNC: 107 MMOL/L (ref 98–107)
CO2: 19 MMOL/L (ref 20–31)
CREAT SERPL-MCNC: 0.79 MG/DL (ref 0.5–0.9)
DIFFERENTIAL TYPE: ABNORMAL
EKG ATRIAL RATE: 112 BPM
EKG P AXIS: 67 DEGREES
EKG P-R INTERVAL: 120 MS
EKG Q-T INTERVAL: 344 MS
EKG QRS DURATION: 68 MS
EKG QTC CALCULATION (BAZETT): 469 MS
EKG R AXIS: 46 DEGREES
EKG T AXIS: 49 DEGREES
EKG VENTRICULAR RATE: 112 BPM
EOSINOPHILS RELATIVE PERCENT: 1 % (ref 1–4)
GFR AFRICAN AMERICAN: >60 ML/MIN
GFR NON-AFRICAN AMERICAN: >60 ML/MIN
GFR SERPL CREATININE-BSD FRML MDRD: ABNORMAL ML/MIN/{1.73_M2}
GFR SERPL CREATININE-BSD FRML MDRD: ABNORMAL ML/MIN/{1.73_M2}
GLUCOSE BLD-MCNC: 101 MG/DL (ref 70–99)
HCT VFR BLD CALC: 27.6 % (ref 36.3–47.1)
HEMOGLOBIN: 8.8 G/DL (ref 11.9–15.1)
IMMATURE GRANULOCYTES: 1 %
LYMPHOCYTES # BLD: 22 % (ref 24–43)
MCH RBC QN AUTO: 32.1 PG (ref 25.2–33.5)
MCHC RBC AUTO-ENTMCNC: 31.9 G/DL (ref 28.4–34.8)
MCV RBC AUTO: 100.7 FL (ref 82.6–102.9)
MONOCYTES # BLD: 11 % (ref 3–12)
NRBC AUTOMATED: 0 PER 100 WBC
PDW BLD-RTO: 14.4 % (ref 11.8–14.4)
PLATELET # BLD: 243 K/UL (ref 138–453)
PLATELET ESTIMATE: ABNORMAL
PMV BLD AUTO: 9 FL (ref 8.1–13.5)
POTASSIUM SERPL-SCNC: 3.8 MMOL/L (ref 3.7–5.3)
RBC # BLD: 2.74 M/UL (ref 3.95–5.11)
RBC # BLD: ABNORMAL 10*6/UL
SEG NEUTROPHILS: 66 % (ref 36–65)
SEGMENTED NEUTROPHILS ABSOLUTE COUNT: 5.22 K/UL (ref 1.5–8.1)
SODIUM BLD-SCNC: 141 MMOL/L (ref 135–144)
TROPONIN INTERP: ABNORMAL
TROPONIN INTERP: ABNORMAL
TROPONIN T: ABNORMAL NG/ML
TROPONIN T: ABNORMAL NG/ML
TROPONIN, HIGH SENSITIVITY: 66 NG/L (ref 0–14)
TROPONIN, HIGH SENSITIVITY: 76 NG/L (ref 0–14)
WBC # BLD: 8 K/UL (ref 3.5–11.3)
WBC # BLD: ABNORMAL 10*3/UL

## 2020-07-11 PROCEDURE — 2500000003 HC RX 250 WO HCPCS

## 2020-07-11 PROCEDURE — 99233 SBSQ HOSP IP/OBS HIGH 50: CPT | Performed by: INTERNAL MEDICINE

## 2020-07-11 PROCEDURE — 2000000003 HC NEURO ICU R&B

## 2020-07-11 PROCEDURE — 6370000000 HC RX 637 (ALT 250 FOR IP): Performed by: REGISTERED NURSE

## 2020-07-11 PROCEDURE — 92523 SPEECH SOUND LANG COMPREHEN: CPT

## 2020-07-11 PROCEDURE — 2580000003 HC RX 258: Performed by: STUDENT IN AN ORGANIZED HEALTH CARE EDUCATION/TRAINING PROGRAM

## 2020-07-11 PROCEDURE — 93010 ELECTROCARDIOGRAM REPORT: CPT | Performed by: INTERNAL MEDICINE

## 2020-07-11 PROCEDURE — 80048 BASIC METABOLIC PNL TOTAL CA: CPT

## 2020-07-11 PROCEDURE — 2580000003 HC RX 258: Performed by: REGISTERED NURSE

## 2020-07-11 PROCEDURE — 2500000003 HC RX 250 WO HCPCS: Performed by: INTERNAL MEDICINE

## 2020-07-11 PROCEDURE — 99232 SBSQ HOSP IP/OBS MODERATE 35: CPT | Performed by: INTERNAL MEDICINE

## 2020-07-11 PROCEDURE — 84484 ASSAY OF TROPONIN QUANT: CPT

## 2020-07-11 PROCEDURE — 6360000002 HC RX W HCPCS

## 2020-07-11 PROCEDURE — 85025 COMPLETE CBC W/AUTO DIFF WBC: CPT

## 2020-07-11 PROCEDURE — 36415 COLL VENOUS BLD VENIPUNCTURE: CPT

## 2020-07-11 PROCEDURE — 6370000000 HC RX 637 (ALT 250 FOR IP): Performed by: INTERNAL MEDICINE

## 2020-07-11 PROCEDURE — 6360000002 HC RX W HCPCS: Performed by: REGISTERED NURSE

## 2020-07-11 PROCEDURE — 2500000003 HC RX 250 WO HCPCS: Performed by: REGISTERED NURSE

## 2020-07-11 PROCEDURE — 6360000002 HC RX W HCPCS: Performed by: NURSE PRACTITIONER

## 2020-07-11 PROCEDURE — C9113 INJ PANTOPRAZOLE SODIUM, VIA: HCPCS | Performed by: REGISTERED NURSE

## 2020-07-11 RX ORDER — LORAZEPAM 2 MG/ML
0.5 INJECTION INTRAMUSCULAR ONCE
Status: COMPLETED | OUTPATIENT
Start: 2020-07-12 | End: 2020-07-11

## 2020-07-11 RX ORDER — SODIUM CHLORIDE, SODIUM LACTATE, POTASSIUM CHLORIDE, CALCIUM CHLORIDE 600; 310; 30; 20 MG/100ML; MG/100ML; MG/100ML; MG/100ML
INJECTION, SOLUTION INTRAVENOUS CONTINUOUS
Status: DISCONTINUED | OUTPATIENT
Start: 2020-07-11 | End: 2020-07-14

## 2020-07-11 RX ORDER — METOPROLOL TARTRATE 5 MG/5ML
5 INJECTION INTRAVENOUS ONCE
Status: COMPLETED | OUTPATIENT
Start: 2020-07-11 | End: 2020-07-11

## 2020-07-11 RX ORDER — METOPROLOL TARTRATE 5 MG/5ML
INJECTION INTRAVENOUS
Status: COMPLETED
Start: 2020-07-11 | End: 2020-07-11

## 2020-07-11 RX ORDER — HALOPERIDOL 5 MG/ML
2 INJECTION INTRAMUSCULAR ONCE
Status: COMPLETED | OUTPATIENT
Start: 2020-07-11 | End: 2020-07-11

## 2020-07-11 RX ORDER — LORAZEPAM 2 MG/ML
INJECTION INTRAMUSCULAR
Status: COMPLETED
Start: 2020-07-11 | End: 2020-07-11

## 2020-07-11 RX ORDER — AMLODIPINE BESYLATE 10 MG/1
10 TABLET ORAL DAILY
Status: DISCONTINUED | OUTPATIENT
Start: 2020-07-12 | End: 2020-07-14

## 2020-07-11 RX ORDER — AMLODIPINE BESYLATE 5 MG/1
5 TABLET ORAL ONCE
Status: COMPLETED | OUTPATIENT
Start: 2020-07-11 | End: 2020-07-11

## 2020-07-11 RX ORDER — LABETALOL HYDROCHLORIDE 5 MG/ML
20 INJECTION, SOLUTION INTRAVENOUS EVERY 4 HOURS PRN
Status: DISCONTINUED | OUTPATIENT
Start: 2020-07-11 | End: 2020-07-14

## 2020-07-11 RX ORDER — METOPROLOL TARTRATE 5 MG/5ML
5 INJECTION INTRAVENOUS EVERY 6 HOURS PRN
Status: DISCONTINUED | OUTPATIENT
Start: 2020-07-11 | End: 2020-07-16 | Stop reason: HOSPADM

## 2020-07-11 RX ADMIN — SODIUM CHLORIDE, POTASSIUM CHLORIDE, SODIUM LACTATE AND CALCIUM CHLORIDE: 600; 310; 30; 20 INJECTION, SOLUTION INTRAVENOUS at 03:42

## 2020-07-11 RX ADMIN — LISINOPRIL 20 MG: 20 TABLET ORAL at 09:48

## 2020-07-11 RX ADMIN — LORAZEPAM 0.5 MG: 2 INJECTION INTRAMUSCULAR at 23:57

## 2020-07-11 RX ADMIN — ACETAMINOPHEN 650 MG: 325 TABLET ORAL at 15:06

## 2020-07-11 RX ADMIN — Medication 1 G: at 11:10

## 2020-07-11 RX ADMIN — TRIMETHOPRIM 100 MG: 100 TABLET ORAL at 09:48

## 2020-07-11 RX ADMIN — ACETAMINOPHEN 650 MG: 325 TABLET ORAL at 22:43

## 2020-07-11 RX ADMIN — AMLODIPINE BESYLATE 5 MG: 5 TABLET ORAL at 09:48

## 2020-07-11 RX ADMIN — METOPROLOL TARTRATE 5 MG: 5 INJECTION, SOLUTION INTRAVENOUS at 10:14

## 2020-07-11 RX ADMIN — PANTOPRAZOLE SODIUM 40 MG: 40 INJECTION, POWDER, FOR SOLUTION INTRAVENOUS at 09:49

## 2020-07-11 RX ADMIN — DOCUSATE SODIUM 100 MG: 100 CAPSULE, LIQUID FILLED ORAL at 09:48

## 2020-07-11 RX ADMIN — METOPROLOL TARTRATE 5 MG: 5 INJECTION INTRAVENOUS at 08:31

## 2020-07-11 RX ADMIN — AMLODIPINE BESYLATE 5 MG: 5 TABLET ORAL at 15:03

## 2020-07-11 RX ADMIN — METOPROLOL TARTRATE 5 MG: 5 INJECTION, SOLUTION INTRAVENOUS at 20:23

## 2020-07-11 RX ADMIN — KETOTIFEN FUMARATE 1 DROP: 0.35 SOLUTION/ DROPS OPHTHALMIC at 20:22

## 2020-07-11 RX ADMIN — DOCUSATE SODIUM 100 MG: 100 CAPSULE, LIQUID FILLED ORAL at 20:22

## 2020-07-11 RX ADMIN — LORAZEPAM 0.5 MG: 2 INJECTION INTRAMUSCULAR; INTRAVENOUS at 23:57

## 2020-07-11 RX ADMIN — Medication 20 MG: at 21:44

## 2020-07-11 RX ADMIN — PROMETHAZINE HYDROCHLORIDE 12.5 MG: 25 TABLET ORAL at 22:43

## 2020-07-11 RX ADMIN — LATANOPROST 1 DROP: 50 SOLUTION OPHTHALMIC at 20:22

## 2020-07-11 RX ADMIN — KETOTIFEN FUMARATE 1 DROP: 0.35 SOLUTION/ DROPS OPHTHALMIC at 09:49

## 2020-07-11 RX ADMIN — Medication 10 ML: at 09:49

## 2020-07-11 RX ADMIN — Medication 20 MG: at 17:32

## 2020-07-11 RX ADMIN — TRIMETHOPRIM 100 MG: 100 TABLET ORAL at 20:22

## 2020-07-11 RX ADMIN — Medication 20 MG: at 13:37

## 2020-07-11 RX ADMIN — HALOPERIDOL LACTATE 2 MG: 5 INJECTION, SOLUTION INTRAMUSCULAR at 07:03

## 2020-07-11 RX ADMIN — Medication 10 MG: at 11:10

## 2020-07-11 RX ADMIN — Medication 1 G: at 03:10

## 2020-07-11 RX ADMIN — METOPROLOL TARTRATE 5 MG: 5 INJECTION, SOLUTION INTRAVENOUS at 08:31

## 2020-07-11 ASSESSMENT — PAIN SCALES - GENERAL
PAINLEVEL_OUTOF10: 8
PAINLEVEL_OUTOF10: 2
PAINLEVEL_OUTOF10: 3

## 2020-07-11 ASSESSMENT — PAIN DESCRIPTION - DESCRIPTORS: DESCRIPTORS: HEADACHE

## 2020-07-11 ASSESSMENT — ENCOUNTER SYMPTOMS
COLOR CHANGE: 0
ABDOMINAL DISTENTION: 0
APNEA: 0
CONSTIPATION: 1
EYE ITCHING: 0

## 2020-07-11 ASSESSMENT — PAIN DESCRIPTION - PAIN TYPE: TYPE: ACUTE PAIN

## 2020-07-11 ASSESSMENT — PAIN DESCRIPTION - LOCATION: LOCATION: HEAD

## 2020-07-11 NOTE — PROGRESS NOTES
Shereen Ray 19    Progress Note    7/11/2020    3:15 PM    Name:   Jose Antonio Salgado  MRN:     [id number]     [place]:      [de-identified]   Room:   [address] [place] [address] Day:  2  Admit Date:  7/9/2020  9:28 AM    PCP:   Cristopher Herron MD  Code Status:  Full Code    Subjective:     C/C: No chief complaint on file. Fall  Interval History Status: improved. Patient was seen and evaluated at bedside this morning. Patient reports that she is feeling well and denies any complaints. As per nursing staff patient continues to try to get out of bed. Brief History: This is a 20-year-old female who was admitted with subdural hematoma and and subarachnoid hematoma and underwent right bur hole placement of subdural drain. Review of Systems:     Constitutional:  negative for chills, fevers, sweats  Respiratory:  negative for cough, dyspnea on exertion, shortness of breath, wheezing  Cardiovascular:  negative for chest pain, chest pressure/discomfort, lower extremity edema, palpitations  Gastrointestinal:  negative for abdominal pain, constipation, diarrhea, nausea, vomiting  Neurological:  negative for dizziness, headache    Medications: Allergies:     Allergies   Allergen Reactions    Bumetanide     Doxycycline     Levaquin [Levofloxacin]     Megestrol     Motrin [Ibuprofen]     Pcn [Penicillins]     Sulfa Antibiotics        Current Meds:   Scheduled Meds:    [START ON 7/12/2020] amLODIPine  10 mg Oral Daily    hydrALAZINE  25 mg Oral 3 times per day    sodium chloride flush  10 mL Intravenous 2 times per day    sodium chloride flush  10 mL Intravenous 2 times per day    ketotifen  1 drop Both Eyes BID    lisinopril  20 mg Oral Daily    docusate sodium  100 mg Oral BID    trimethoprim  100 mg Oral BID    latanoprost  1 drop Both Eyes Nightly    sodium chloride flush  10 mL Intravenous 2 times per day    pantoprazole  40 mg Intravenous Daily    And    sodium chloride (PF)  10 mL Intravenous Daily     Continuous Infusions:    lactated ringers 75 mL/hr at 20 0342     PRN Meds: metoprolol, labetalol, sodium chloride flush, sodium chloride flush, potassium chloride **OR** potassium alternative oral replacement **OR** potassium chloride, magnesium sulfate, acetaminophen **OR** acetaminophen, polyethylene glycol, promethazine **OR** ondansetron, sodium chloride flush    Data:     Past Medical History:   has a past medical history of Dementia ([address] Essential hypertension, and Glaucoma. Social History:   reports that she has never smoked. She does not have any smokeless tobacco history on file. She reports previous alcohol use. Family History:   Family History   Problem Relation Age of Onset    Diabetes Mother        Vitals:  BP (!) 186/73   Pulse 114   Temp 98.6 °F (37 °C) (Oral)   Resp 23   Ht 5' 1\" (1.549 m)   Wt 97 lb 9.6 oz (44.3 kg)   SpO2 99%   BMI 18.44 kg/m²   Temp (24hrs), Av.6 °F (36.4 °C), Min:96.8 °F (36 °C), Max:99.1 °F (37.3 °C)    No results for input(s): POCGLU in the last 72 hours. I/O (24Hr):     Intake/Output Summary (Last 24 hours) at 2020 1515  Last data filed at 2020 1300  Gross per 24 hour   Intake 1657 ml   Output 838 ml   Net 819 ml       Labs:  Hematology:  Recent Labs     20  1043 07/10/20  0444 20  0415   WBC 5.2 6.0 8.0   RBC 3.17* 3.32* 2.74*   HGB 10.1* 10.5* 8.8*   HCT 32.1* 33.0* 27.6*   .3 99.4 100.7   MCH 31.9 31.6 32.1   MCHC 31.5 31.8 31.9   RDW 14.4 14.1 14.4    295 243   MPV 10.1 8.8 9.0   INR 1.0 1.0  --      Chemistry:  Recent Labs     20  1043 07/10/20  0444 20  0415 20  1054    142 141  --    K 4.2 4.4 3.8  --     105 107  --    CO2 23 19* 19*  --    GLUCOSE 97 105* 101*  --    BUN 10 11 9  --    CREATININE 0.74 0.68 0.79  --    ANIONGAP 11 18* 15  --    LABGLOM NOT REPORTED >60 >60  --    GFRAA NOT Tachycardic regular rhythm  Abdomen:  soft, nontender, nondistended, normal bowel sounds, no masses, hepatomegaly, splenomegaly  Extremities:  no edema, redness, tenderness in the calves  Skin:  no gross lesions, rashes, induration    Assessment:        Hospital Problems           Last Modified POA    * (Principal) Subdural hematoma ([address] 7/9/2020 Yes    Subarachnoid hemorrhage ([address] 7/9/2020 Yes    Hypertensive urgency 7/9/2020 Yes    Glaucoma 7/9/2020 Yes    Alzheimer disease ([address] 7/9/2020 Yes    Essential hypertension 7/9/2020 Yes    Macrocytic anemia 7/9/2020 Yes    CHI (closed head injury), initial encounter 7/9/2020 Yes          Plan:        Hypertensive urgency  -We will change Norvasc to 10 mg daily  -We will hold off on hydralazine due to tachycardia  -We will continue IV Lopressor and labetalol as needed       Glucoma  -Continue home medications     Alzheimer's disease  -Monitor        Macrocytic anemia work-up as outpatient transfuse if hemoglobin below 7     Recent CO VID 19 infection resolved      Subarachnoid and subdural hematoma  -Status post bur hole surgery  -Neurosurgery on board    Lisa Copeland MD  7/11/2020  3:15 PM

## 2020-07-11 NOTE — PROGRESS NOTES
Neurosurgery Resident  Daily Progress Note    7/11/2020  1:33 PM    Patient seen and examined. Per RN agitated. Continues to sit up. Elevated troponins. Patient is tachycardic and hypertensive. Patient continues to complain of Left sided frontal HA that is unchanged in intensity since admission. Vitals:    07/11/20 0948 07/11/20 1001 07/11/20 1100 07/11/20 1231   BP: (!) 155/75 (!) 171/65 (!) 169/77 (!) 173/66   Pulse:  118 112 113   Resp:  19 22 22   Temp:       TempSrc:       SpO2:  99% 99% 97%   Weight:       Height:           PE:   Gen: NAD. Follows commands. Cardiovascular: Tachycardic  no dependent edema, distal pulses 2+  Respiratory: Chest symmetric, no accessory muscle use, normal respirations   Neuro:  Subdural drain in place to right scalp. Area surrounding clean without discharge. 33 total output since surgery  +5/5  strength BUE.   +5/5 dorsiflexion and plantarflexion BLE  SILT C5-T1 and L4-S1 bilaterally      Lab Results   Component Value Date    WBC 8.0 07/11/2020    HGB 8.8 (L) 07/11/2020    HCT 27.6 (L) 07/11/2020     07/11/2020    ALT 7 07/10/2020    AST 18 07/10/2020     07/11/2020    K 3.8 07/11/2020     07/11/2020    CREATININE 0.79 07/11/2020    BUN 9 07/11/2020    CO2 19 (L) 07/11/2020    TSH 2.03 07/10/2020    INR 1.0 07/10/2020       80 y.o. female with Right sided subdural hematoma and SAH s/p Right leyla hole, placement of subdural drain POD #1      - HOB: 30 degrees but okay to mobilize with PT   -Continue tylenol for HA   -Recommend Cardiology consult for elevated troponins and increased HR/SBP   - Neuro checks per floor protocol    Please contact neurosurgery with any changes in patients neurologic status.       Tika DO Nidhi   1:33 PM

## 2020-07-11 NOTE — PROGRESS NOTES
Throughout writers shift pt SBP consistently 160s-170s, despite given PRN meds. Writer notified primary mult times. See orders for increase in PRN Labetalol and scheduled Norvasc. Will continue to monitor.

## 2020-07-11 NOTE — PROGRESS NOTES
[address]  Occupational Therapy Not Seen Note     Patient not available for Occupational Therapy due to:     [] Testing:     [] Hemodialysis     [] Blood Transfusion in Progress     []Refusal by Patient:      [] Surgery/Procedure:     [x] Strict Bedrest- HOB 30 degrees restriction     [] Sedation     [] Spine Precautions      [] Pt being transferred to palliative care at this time. Spoke with pt/family and OT services to be defered.     [] Pt independent with functional mobility and functional tasks.  Pt with no OT acute care needs at this time, will defer OT eval.     [] Other    Next Scheduled Treatment: 7/12/2020     Signature: JEANNINE Henao/SHAHRIAR

## 2020-07-11 NOTE — PLAN OF CARE
Problem: Falls - Risk of:  Goal: Will remain free from falls  Description: Will remain free from falls  Outcome: Met This Shift    Pt assessed as a fall risk this shift. Remains free from falls and accidental injury. Fall precautions in place, including falling star sign and fall risk band on pt. Floor free from obstacles, and bed is locked and in lowest position. Adequate lighting provided. Pt encouraged to call before getting out of bed for any need. Bed alarm activated. Will continue to monitor needs during hourly rounding, and reinforce education on use of call light.      Problem: Skin Integrity:  Goal: Will show no infection signs and symptoms  Description: Will show no infection signs and symptoms  Outcome: Met This Shift

## 2020-07-11 NOTE — PROGRESS NOTES
[facility] -   Infectious diseases evaluation  admission date 7/9/2020    reason for consultation:   Suspected COVID    Impression :   Current:  · Prior covid - Resolved  · Current COVID test - negative  · Traumatic subdural hematoma  · Traumatic right frontotemporal subarachnoid bleed  · S/P Domingo hole drainage. 7-11-20      Discussion / summary of stay / plan of care   ·   Recommendations   · Remove COVID isolation  · Supportive care    Infection Control Recommendations   · [place] Precautions    Antimicrobial Stewardship Recommendations   · Simplification of therapy  · Targeted therapy      Coordination ofOutpatient Care:   · Estimated Length of IV antimicrobials:  · Patient will need Midline / picc Catheter Insertion:   · Patient will need SNF:  · Patient will need outpatient wound care:     History of Present Illness:   Initial history:  Jessica Salazar is a 80y.o.-year-old female 80years old lady who fell at home and was brought in because of a right-sided subdural hematoma and mild right frontotemporal subarachnoid hemorrhage. She did test positive for COVID quite a few weeks ago, she was brought in to be retested and came back negative. Her chest x-ray was negative as well for any acute infiltrates, her white count 5.2 and she had no fever  No cough or phlegm. Neurosurgery on board planning for a bur hole procedure, holding all antiplatelets and anticoagulation. Pt is Ox 3 and very appropriate  Bruises over the right arm and both legs  HA seems better    Interval changes  7/11/2020     Patient evaluated and examined in the ICU. Afebrile  VS stable. HTN improved    Underwent Rt Domingo hole drainage 7-11-20  Comfortable and stable post op      Summary of relevant labs:7/11/2020    Labs:    BUN 9  Cr 0,52    W 5.2-->8.0  Hb 8.8  Plat 243    Micro:    Imaging:    Discussed with patient, RNEtta.     I have personally reviewed the past medical history, past surgical history, medications, social history, and family history, and I haveupdated the database accordingly. Past Medical History:     Past Medical History:   Diagnosis Date    Dementia ([facility] [address]     Essential hypertension     Glaucoma        Past Surgical  History:     Past Surgical History:   Procedure Laterality Date    CRANIOTOMY Right 07/10/2020     CRANIOTOMY, OMAYRA HOLE     TONSILLECTOMY         Medications:      [START ON 7/12/2020] amLODIPine  10 mg Oral Daily    [Held by provider] hydrALAZINE  25 mg Oral 3 times per day    sodium chloride flush  10 mL Intravenous 2 times per day    sodium chloride flush  10 mL Intravenous 2 times per day    ketotifen  1 drop Both Eyes BID    lisinopril  20 mg Oral Daily    docusate sodium  100 mg Oral BID    trimethoprim  100 mg Oral BID    latanoprost  1 drop Both Eyes Nightly    sodium chloride flush  10 mL Intravenous 2 times per day    pantoprazole  40 mg Intravenous Daily    And    sodium chloride (PF)  10 mL Intravenous Daily       Social History:     Social History     Socioeconomic History    Marital status:       Spouse name: Not on file    Number of children: Not on file    Years of education: Not on file    Highest education level: Not on file   Occupational History    Not on file   Social Needs    Financial resource strain: Not on file    Food insecurity     Worry: Not on file     Inability: Not on file    Transportation needs     Medical: Not on file     Non-medical: Not on file   Tobacco Use    Smoking status: Never Smoker   Substance and Sexual Activity    Alcohol use: Not Currently     Comment: socially    Drug use: Not on file    Sexual activity: Not on file   Lifestyle    Physical activity     Days per week: Not on file     Minutes per session: Not on file    Stress: Not on file   Relationships    Social connections     Talks on phone: Not on file     Gets together: Not on file     Attends Denominational service: Not on file     Active member of club or organization: Not on file     Attends meetings of clubs or organizations: Not on file     Relationship status: Not on file    Intimate partner violence     Fear of current or ex partner: Not on file     Emotionally abused: Not on file     Physically abused: Not on file     Forced sexual activity: Not on file   Other Topics Concern    Not on file   Social History Narrative    Not on file       Family History:     Family History   Problem Relation Age of Onset    Diabetes Mother         Allergies:   Bumetanide; Doxycycline; Levaquin [levofloxacin]; Megestrol; Motrin [ibuprofen]; Pcn [penicillins]; and Sulfa antibiotics     Review of Systems:     Review of Systems   Constitutional: Negative for activity change and appetite change. HENT: Negative for congestion. Eyes: Negative for itching. Respiratory: Negative for apnea. Cardiovascular: Negative for chest pain. Gastrointestinal: Positive for constipation. Negative for abdominal distention. Endocrine: Negative for heat intolerance. Genitourinary: Negative for dysuria. Musculoskeletal: Negative for arthralgias. Skin: Negative for color change. Allergic/Immunologic: Negative for immunocompromised state. Neurological: Negative for dizziness. Hematological: Negative for adenopathy. Psychiatric/Behavioral: Negative for agitation.        Physical Examination :     Patient Vitals for the past 8 hrs:   BP Temp Temp src Pulse Resp SpO2   07/11/20 1737 (!) 126/56 -- -- 106 23 99 %   07/11/20 1701 (!) 170/86 -- -- 114 24 98 %   07/11/20 1514 -- -- -- 111 23 99 %   07/11/20 1513 (!) 167/56 98 °F (36.7 °C) Oral 110 24 98 %   07/11/20 1503 (!) 186/73 -- -- -- -- --   07/11/20 1402 (!) 167/71 -- -- 114 23 99 %   07/11/20 1348 (!) 153/74 -- -- 113 23 100 %   07/11/20 1338 (!) 169/66 -- -- 115 22 98 %   07/11/20 1300 (!) 144/101 -- -- 118 22 98 %   07/11/20 1231 (!) 173/66 -- -- 113 22 97 %   07/11/20 1203 Minh Maid ) 172/81 98.6 °F (37 °C) Oral 113 23 99 %   07/11/20 1100 (!) 169/77 -- -- 112 22 99 %   07/11/20 1001 (!) 171/65 -- -- 118 19 99 %       Physical Exam  Constitutional:       General: She is not in acute distress. Appearance: Normal appearance. She is normal weight. She is not ill-appearing. HENT:      Head: Normocephalic and atraumatic. Nose: Nose normal.      Mouth/Throat:      Mouth: Mucous membranes are moist.      Pharynx: Oropharynx is clear. Eyes:      General: No scleral icterus. Conjunctiva/sclera: Conjunctivae normal.      Pupils: Pupils are equal, round, and reactive to light. Neck:      Musculoskeletal: Neck supple. No neck rigidity. Cardiovascular:      Rate and Rhythm: Normal rate and regular rhythm. Heart sounds: Normal heart sounds. Pulmonary:      Breath sounds: Normal breath sounds. Abdominal:      General: There is no distension. Palpations: There is no mass. Tenderness: There is no abdominal tenderness. Genitourinary:     Comments: No fuchs  Musculoskeletal:         General: No swelling or deformity. Skin:     General: Skin is dry. Coloration: Skin is not jaundiced. Neurological:      General: No focal deficit present. Mental Status: She is alert and oriented to person, place, and time. Mental status is at baseline. Psychiatric:         Mood and Affect: Mood normal.         Thought Content:  Thought content normal.           Medical Decision Making:   I have independently reviewed/ordered the following labs:    CBC with Differential:   Recent Labs     07/09/20  1043 07/10/20  0444 07/11/20  0415   WBC 5.2 6.0 8.0   HGB 10.1* 10.5* 8.8*   HCT 32.1* 33.0* 27.6*    295 243   LYMPHOPCT 44*  --  22*   MONOPCT 8  --  11     BMP:  Recent Labs     07/10/20  0444 07/11/20  0415    141   K 4.4 3.8    107   CO2 19* 19*   BUN 11 9   CREATININE 0.68 0.79     Hepatic Function Panel:   Recent Labs     07/10/20  0444   PROT 5.9* LABALBU 3.6   BILITOT 0.37   ALKPHOS 68   ALT 7   AST 18     No results for input(s): RPR in the last 72 hours. No results for input(s): HIV in the last 72 hours. No results for input(s): BC in the last 72 hours. Lab Results   Component Value Date    CREATININE 0.79 07/11/2020    GLUCOSE 101 07/11/2020       Detailed results: Thank you for allowing us to participate in the care of this patient. Please call with questions. This note is created with the assistance of a speech recognition program.  While intending to generate adocument that actually reflects the content of the visit, the document can still have some errors including those of syntax and sound a like substitutions which may escape proof reading. It such instances, actual meaningcan be extrapolated by contextual diversion.     Meredith Moody MD  Office: [phone]  Perfect serve / office [phone]

## 2020-07-11 NOTE — PROGRESS NOTES
is unknown at this time. IMPRESSIONS  Pt does appear to be presenting with some mild-moderate cognitive-linguistic deficits. She does have some strengths (attention/concentration, immediate memory, recognition memory). She is oriented to person and grossly to time (July, \"either 2020 or 2021\") but not place. She thinks she lives at home alone and that [facility] [place] C[facility] AT [facility]. RECOMMENDATIONS  1) ST is not warranted during hospitalization but will likely be warranted at next level of care          Pain:  Pain Assessment  Pain Assessment: Respiratory Rate >10  Pain Level: 3    Assessment:  Cognitive Diagnosis: Mild-Moderate cognitive impairments        Recommendations:                Plan:   Goals:      Patient/family involved in developing goals and treatment plan: no family seen    Subjective:   Previous level of function and limitations: unknown other than she has \"dementia\"                    Objective: Auditory Comprehension  Comprehension: Within Functional Limits         Expression  Primary Mode of Expression: Verbal    Verbal Expression  Verbal Expression: Within functional limits         Motor Speech  Motor Speech: Within Functional Limits         Cognition:      Orientation  Overall Orientation Status: Impaired  Orientation Level: Oriented to person;Oriented to situation;Disoriented to place; Disoriented to time  Attention  Attention: Within Functional Limits  Memory  Memory: Exceptions to [facility]  Short-term Memory:  Moderate    Additional Assessments:             Prognosis:  Speech Therapy Prognosis  Prognosis Considerations: Age;Participation Level    Education:  Patient Education Response: Verbalizes understanding;Needs reinforcement          Therapy Time:   Individual Concurrent Group Co-treatment   Time In 0907         Time Out 0934         Minutes [place], [place]  7/11/2020 9:34 AM

## 2020-07-11 NOTE — PROGRESS NOTES
Physical Therapy  DATE: 2020    NAME: Betina Ruiz  MRN: [id number]   : 1926    Patient not seen this date for Physical Therapy due to:  [] Blood transfusion in progress  [] Hemodialysis  []  Patient Declined  [] Spine Precautions   [] Strict Bedrest  [] Surgery/ Procedure  [] Testing      [x] Other: HOB 30deg, SBR after clarifying orders. [] PT being discontinued at this time. Patient independent. No further needs. [] PT being discontinued at this time as the patient has been transferred to palliative care. No further needs.     Tonja Chinchilla, PT

## 2020-07-11 NOTE — PROGRESS NOTES
Patient confused, combative, not redirectable, pulling off telemetry, sitting up in bed with drain in. Patient not able to be oriented at this time.  Drain clamped at this time until further orders

## 2020-07-12 ENCOUNTER — APPOINTMENT (OUTPATIENT)
Dept: CT IMAGING | Age: 85
DRG: 025 | End: 2020-07-12
Payer: MEDICARE

## 2020-07-12 PROCEDURE — C9113 INJ PANTOPRAZOLE SODIUM, VIA: HCPCS | Performed by: REGISTERED NURSE

## 2020-07-12 PROCEDURE — 6370000000 HC RX 637 (ALT 250 FOR IP): Performed by: REGISTERED NURSE

## 2020-07-12 PROCEDURE — 2060000000 HC ICU INTERMEDIATE R&B

## 2020-07-12 PROCEDURE — 2580000003 HC RX 258: Performed by: STUDENT IN AN ORGANIZED HEALTH CARE EDUCATION/TRAINING PROGRAM

## 2020-07-12 PROCEDURE — 70450 CT HEAD/BRAIN W/O DYE: CPT

## 2020-07-12 PROCEDURE — 99233 SBSQ HOSP IP/OBS HIGH 50: CPT | Performed by: INTERNAL MEDICINE

## 2020-07-12 PROCEDURE — 2500000003 HC RX 250 WO HCPCS

## 2020-07-12 PROCEDURE — 6360000002 HC RX W HCPCS: Performed by: REGISTERED NURSE

## 2020-07-12 PROCEDURE — 99024 POSTOP FOLLOW-UP VISIT: CPT | Performed by: NEUROLOGICAL SURGERY

## 2020-07-12 PROCEDURE — 99231 SBSQ HOSP IP/OBS SF/LOW 25: CPT | Performed by: INTERNAL MEDICINE

## 2020-07-12 PROCEDURE — 6370000000 HC RX 637 (ALT 250 FOR IP): Performed by: INTERNAL MEDICINE

## 2020-07-12 PROCEDURE — 2580000003 HC RX 258: Performed by: REGISTERED NURSE

## 2020-07-12 PROCEDURE — 2500000003 HC RX 250 WO HCPCS: Performed by: INTERNAL MEDICINE

## 2020-07-12 RX ORDER — LIDOCAINE HYDROCHLORIDE 10 MG/ML
INJECTION, SOLUTION INFILTRATION; PERINEURAL
Status: COMPLETED
Start: 2020-07-12 | End: 2020-07-12

## 2020-07-12 RX ORDER — 0.9 % SODIUM CHLORIDE 0.9 %
250 INTRAVENOUS SOLUTION INTRAVENOUS ONCE
Status: COMPLETED | OUTPATIENT
Start: 2020-07-12 | End: 2020-07-12

## 2020-07-12 RX ADMIN — SODIUM CHLORIDE 250 ML: 9 INJECTION, SOLUTION INTRAVENOUS at 03:52

## 2020-07-12 RX ADMIN — TRIMETHOPRIM 100 MG: 100 TABLET ORAL at 20:17

## 2020-07-12 RX ADMIN — PANTOPRAZOLE SODIUM 40 MG: 40 INJECTION, POWDER, FOR SOLUTION INTRAVENOUS at 08:41

## 2020-07-12 RX ADMIN — ACETAMINOPHEN 650 MG: 325 TABLET ORAL at 15:15

## 2020-07-12 RX ADMIN — Medication 20 MG: at 13:03

## 2020-07-12 RX ADMIN — TRIMETHOPRIM 100 MG: 100 TABLET ORAL at 08:41

## 2020-07-12 RX ADMIN — Medication 20 MG: at 04:15

## 2020-07-12 RX ADMIN — LATANOPROST 1 DROP: 50 SOLUTION OPHTHALMIC at 20:17

## 2020-07-12 RX ADMIN — LIDOCAINE HYDROCHLORIDE: 10 INJECTION, SOLUTION INFILTRATION; PERINEURAL at 19:22

## 2020-07-12 RX ADMIN — KETOTIFEN FUMARATE 1 DROP: 0.35 SOLUTION/ DROPS OPHTHALMIC at 20:17

## 2020-07-12 RX ADMIN — KETOTIFEN FUMARATE 1 DROP: 0.35 SOLUTION/ DROPS OPHTHALMIC at 08:41

## 2020-07-12 RX ADMIN — SODIUM CHLORIDE, POTASSIUM CHLORIDE, SODIUM LACTATE AND CALCIUM CHLORIDE: 600; 310; 30; 20 INJECTION, SOLUTION INTRAVENOUS at 14:23

## 2020-07-12 RX ADMIN — Medication 10 ML: at 08:50

## 2020-07-12 RX ADMIN — DOCUSATE SODIUM 100 MG: 100 CAPSULE, LIQUID FILLED ORAL at 08:41

## 2020-07-12 RX ADMIN — LISINOPRIL 20 MG: 20 TABLET ORAL at 08:41

## 2020-07-12 RX ADMIN — DOCUSATE SODIUM 100 MG: 100 CAPSULE, LIQUID FILLED ORAL at 20:17

## 2020-07-12 RX ADMIN — AMLODIPINE BESYLATE 10 MG: 10 TABLET ORAL at 08:41

## 2020-07-12 ASSESSMENT — ENCOUNTER SYMPTOMS
CONSTIPATION: 1
COLOR CHANGE: 0
EYE ITCHING: 0
ABDOMINAL DISTENTION: 0
APNEA: 0

## 2020-07-12 ASSESSMENT — PAIN SCALES - GENERAL
PAINLEVEL_OUTOF10: 8
PAINLEVEL_OUTOF10: 0

## 2020-07-12 NOTE — PLAN OF CARE
Problem: Falls - Risk of:  Goal: Will remain free from falls  Description: Will remain free from falls  7/12/2020 0333 by Suha Vela RN  Outcome: Met This Shift  7/11/2020 1659 by Patel Swift RN  Outcome: Met This Shift  Goal: Absence of physical injury  Description: Absence of physical injury  Outcome: Met This Shift

## 2020-07-12 NOTE — PROGRESS NOTES
[facility]    Progress Note    7/12/2020    10:40 AM    Name:   Devin Forrest  MRN:     [id number]     [place]:      [de-identified]   Room:   [address] [place] [address] Day:  3  Admit Date:  7/9/2020  9:28 AM    PCP:   Kate Briscoe MD  Code Status:  Full Code    Subjective:     C/C: No chief complaint on file. Fall  Interval History Status: improved. Patient was seen and evaluated at bedside this morning. Patient was sleeping. Brief History: This is a 80-year-old female who was admitted with subdural hematoma and and subarachnoid hematoma and underwent right bur hole placement of subdural drain. Review of Systems:     Constitutional:  negative for chills, fevers, sweats  Respiratory:  negative for cough, dyspnea on exertion, shortness of breath, wheezing  Cardiovascular:  negative for chest pain, chest pressure/discomfort, lower extremity edema, palpitations  Gastrointestinal:  negative for abdominal pain, constipation, diarrhea, nausea, vomiting  Neurological:  negative for dizziness, headache    Medications: Allergies:     Allergies   Allergen Reactions    Bumetanide     Doxycycline     Levaquin [Levofloxacin]     Megestrol     Motrin [Ibuprofen]     Pcn [Penicillins]     Sulfa Antibiotics        Current Meds:   Scheduled Meds:    amLODIPine  10 mg Oral Daily    [Held by provider] hydrALAZINE  25 mg Oral 3 times per day    sodium chloride flush  10 mL Intravenous 2 times per day    sodium chloride flush  10 mL Intravenous 2 times per day    ketotifen  1 drop Both Eyes BID    lisinopril  20 mg Oral Daily    docusate sodium  100 mg Oral BID    trimethoprim  100 mg Oral BID    latanoprost  1 drop Both Eyes Nightly    sodium chloride flush  10 mL Intravenous 2 times per day    pantoprazole  40 mg Intravenous Daily    And    sodium chloride (PF)  10 mL Intravenous Daily     Continuous Infusions:    lactated ringers 100 mL/hr at 20 0353     PRN Meds: metoprolol, labetalol, sodium chloride flush, sodium chloride flush, potassium chloride **OR** potassium alternative oral replacement **OR** potassium chloride, magnesium sulfate, acetaminophen **OR** acetaminophen, polyethylene glycol, promethazine **OR** ondansetron, sodium chloride flush    Data:     Past Medical History:   has a past medical history of Dementia ([address] Essential hypertension, and Glaucoma. Social History:   reports that she has never smoked. She does not have any smokeless tobacco history on file. She reports previous alcohol use. Family History:   Family History   Problem Relation Age of Onset    Diabetes Mother        Vitals:  BP (!) 190/63   Pulse 102   Temp 98.5 °F (36.9 °C) (Oral)   Resp 20   Ht 5' 1\" (1.549 m)   Wt 97 lb 9.6 oz (44.3 kg)   SpO2 98%   BMI 18.44 kg/m²   Temp (24hrs), Av.4 °F (36.9 °C), Min:98 °F (36.7 °C), Max:98.6 °F (37 °C)    No results for input(s): POCGLU in the last 72 hours. I/O (24Hr):     Intake/Output Summary (Last 24 hours) at 2020 1040  Last data filed at 2020 0600  Gross per 24 hour   Intake 1288 ml   Output 1783 ml   Net -495 ml       Labs:  Hematology:  Recent Labs     20  1043 07/10/20  0444 20  0415   WBC 5.2 6.0 8.0   RBC 3.17* 3.32* 2.74*   HGB 10.1* 10.5* 8.8*   HCT 32.1* 33.0* 27.6*   .3 99.4 100.7   MCH 31.9 31.6 32.1   MCHC 31.5 31.8 31.9   RDW 14.4 14.1 14.4    295 243   MPV 10.1 8.8 9.0   INR 1.0 1.0  --      Chemistry:  Recent Labs     20  1043 07/10/20  0444 20  0415 20  1054    142 141  --    K 4.2 4.4 3.8  --     105 107  --    CO2 23 19* 19*  --    GLUCOSE 97 105* 101*  --    BUN 10 11 9  --    CREATININE 0.74 0.68 0.79  --    ANIONGAP 11 18* 15  --    LABGLOM NOT REPORTED >60 >60  --    GFRAA NOT REPORTED >60 >60  --    CALCIUM 8.8 8.8 8.4*  --    TROPHS  --   --  76* 66*     Recent Labs     07/10/20  0444 PROT 5.9*   LABALBU 3.6   TSH 2.03   AST 18   ALT 7   ALKPHOS 68   BILITOT 0.37     ABG:No results found for: POCPH, PHART, PH, POCPCO2, JHZ7RRN, PCO2, POCPO2, PO2ART, PO2, POCHCO3, BOX3MKM, HCO3, NBEA, PBEA, BEART, BE, THGBART, THB, RRU4OXJ, WLMG0PRN, P6SKSASV, O2SAT, FIO2  No results found for: SPECIAL  No results found for: CULTURE    Radiology:  Xr Humerus Left (min 2 Views)    Result Date: 7/9/2020  No acute osseous abnormality of the left humerus. No acute osseous abnormality of the left knee. Xr Knee Left (3 Views)    Result Date: 7/9/2020  No acute osseous abnormality of the left humerus. No acute osseous abnormality of the left knee. Ct Head Wo Contrast    Result Date: 7/10/2020  1. 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 Pneumocephalus largely replaces the fluid with increased mass effect upon the right frontal convexity. However, there is no significant change in right to left subfalcine herniation of up to 0.9 cm. 2. Unchanged subarachnoid hemorrhage along the right frontal convexity. Ct Head Wo Contrast    Result Date: 7/9/2020  1. Right-sided subdural fluid collection of 15 mm with some attenuation of the right lateral ventricle and leftward midline shift of 2.4 mm. 2.  Mild right frontotemporal subarachnoid hemorrhage. 3.  Senescent changes including chronic microvascular change. Critical results were called by Dr. Afua Castro MD to [address] [place] [address] on 7/9/2020 at 10:34. Xr Chest Portable    Result Date: 7/9/2020  No acute cardiopulmonary disease.        Physical Examination:        General appearance:  alert, cooperative and no distress  Mental Status:  oriented to person, place and time and normal affect  Lungs:  clear to auscultation bilaterally, normal effort  Heart: Tachycardic regular rhythm  Abdomen:  soft, nontender, nondistended, normal bowel sounds, no masses, hepatomegaly, splenomegaly  Extremities:  no edema, redness, tenderness in the calves  Skin:  no gross lesions, rashes, induration    Assessment:        Hospital Problems           Last Modified POA    * (Principal) Subdural hematoma ([facility] [address] 7/9/2020 Yes    Subarachnoid hemorrhage ([facility] [address] 7/9/2020 Yes    Hypertensive urgency 7/9/2020 Yes    Glaucoma 7/9/2020 Yes    Alzheimer disease ([facility] [address] 7/9/2020 Yes    Essential hypertension 7/9/2020 Yes    Macrocytic anemia 7/9/2020 Yes    CHI (closed head injury), initial encounter 7/9/2020 Yes          Plan:        Hypertensive urgency  -Continue Norvasc to 10 mg daily  -We will hold off on hydralazine due to tachycardia  -We will continue IV Lopressor and labetalol as needed       Glucoma  -Continue home medications     Alzheimer's disease  -Monitor        Macrocytic anemia work-up as outpatient transfuse if hemoglobin below 7     Recent CO VID 19 infection resolved      Subarachnoid and subdural hematoma  -Status post bur hole surgery  -Neurosurgery on board    Bertha Mcmahon MD  7/12/2020  10:40 AM

## 2020-07-12 NOTE — PROGRESS NOTES
Neurosurgery Resident    Daily Progress Note     7/12/2020  11:10 AM    Subjective:    No acute events overnight. No new complaints. Per nursing having 20cc output every hour with a lot of CSF dumping overnight  Remains tachycardic and hypertensive, but improved compared to yesterday    Vitals:    07/12/20 0841 07/12/20 0955 07/12/20 1000 07/12/20 1102   BP: (!) 190/63 133/60 (!) 105/37 (!) 143/116   Pulse:  103 90 116   Resp:  19 17 24   Temp:       TempSrc:       SpO2:  99% 98% 99%   Weight:       Height:           Objective:    Gen: Resting comfortably, no acute distress  CV: RRR, pulses 2+  Resp: Symmetric chest rise, no accessory muscle use, normal respirations    MSK:  Subdural drain in place to right scalp. Area surrounding clean without discharge.  111 output last 24  +5/5  strength BUE.   +5/5 dorsiflexion and plantarflexion BLE  SILT C5-T1 and L4-S1 bilaterally    Lab Results   Component Value Date    WBC 8.0 07/11/2020    HGB 8.8 (L) 07/11/2020    HCT 27.6 (L) 07/11/2020     07/11/2020    ALT 7 07/10/2020    AST 18 07/10/2020     07/11/2020    K 3.8 07/11/2020     07/11/2020    CREATININE 0.79 07/11/2020    BUN 9 07/11/2020    CO2 19 (L) 07/11/2020    TSH 2.03 07/10/2020    INR 1.0 07/10/2020       Assessment/Plan    80 y.o. female with R SDH and SAH s/p leyla hole, subdural drain pod2.     - HOB: back down to flat supine  - Drainage continues elevated >20 per hour  - Will clamp and lay supine and re-evaluate  - Neuro checks per floor protocol  - Additional recommendations may follow  - IM managing tachycardia and HTN      Please contact neurosurgery with any changes in patients neurologic status    Venancio Rosales DO  PGY-5, Department of [address], [facility]  11:10 AM 7/12/2020

## 2020-07-12 NOTE — PLAN OF CARE
Problem: Falls - Risk of:  Goal: Will remain free from falls  Description: Will remain free from falls  7/12/2020 1628 by Mariah Olguin RN  Outcome: Met This Shift    Pt assessed as a fall risk this shift. Remains free from falls and accidental injury. Fall precautions in place, including falling star sign and fall risk band on pt. Floor free from obstacles, and bed is locked and in lowest position. Adequate lighting provided. Pt encouraged to call before getting out of bed for any need. Bed alarm activated. Will continue to monitor needs during hourly rounding, and reinforce education on use of call light. Problem: Skin Integrity:  Goal: Will show no infection signs and symptoms  Description: Will show no infection signs and symptoms  7/12/2020 1628 by Mariah Olguin RN  Outcome: Met This Shift   Patient repositioned and turned every 2 hours with heels of feet floating off bed. Toileting and brief check q2hr. Mepliex applied to sacrum and patient's skin remains structurally intact.   Will continue to monitor

## 2020-07-12 NOTE — PROGRESS NOTES
This RN paged the Dr with concerns of increased SBP, HR, and restlessness. Please see orders for ativan 0.5mg. Pt is now resting with a HR of 106.

## 2020-07-12 NOTE — PROGRESS NOTES
Physical Therapy  DATE: 2020    NAME: Macie Sun  MRN: [id number]   : 1926    Patient not seen this date for Physical Therapy due to:  [] Blood transfusion in progress  [] Hemodialysis  []  Patient Declined  [] Spine Precautions   [x] Strict Bedrest: HOB 30deg, ck pm as able  [] Surgery/ Procedure  [] Testing      [] Other        [] PT being discontinued at this time. Patient independent. No further needs. [] PT being discontinued at this time as the patient has been transferred to palliative care. No further needs.     Paola Dent, PT

## 2020-07-12 NOTE — PROGRESS NOTES
[facility] -   Infectious diseases evaluation. Progress Note    admission date 7/9/2020    reason for consultation:   Suspected COVID    Impression :   Current:  · Prior covid - Resolved  · COVID excluded by laboratory testing  · Traumatic subdural hematoma  · Traumatic right frontotemporal subarachnoid bleed  · S/P Domingo hole drainage. 7-11-20      Discussion / summary of stay / plan of care   ·   Recommendations   · Remove COVID isolation  · Supportive care    Infection Control Recommendations   · [place] Precautions    Antimicrobial Stewardship Recommendations   · Simplification of therapy  · Targeted therapy      Coordination ofOutpatient Care:   · Estimated Length of IV antimicrobials:  · Patient will need Midline / picc Catheter Insertion:   · Patient will need SNF:  · Patient will need outpatient wound care:     History of Present Illness:   Initial history:  Catrachito Flores is a 80y.o.-year-old female 80years old lady who fell at home and was brought in because of a right-sided subdural hematoma and mild right frontotemporal subarachnoid hemorrhage. She did test positive for COVID quite a few weeks ago, she was brought in to be retested and came back negative. Her chest x-ray was negative as well for any acute infiltrates, her white count 5.2 and she had no fever  No cough or phlegm. Neurosurgery on board planning for a bur hole procedure, holding all antiplatelets and anticoagulation. Pt is Ox 3 and very appropriate  Bruises over the right arm and both legs  HA seems better    Interval changes  7/12/2020     Patient evaluated and examined in the ICU. Afebrile  VS stable.  HTN improved    Underwent Rt Domnigo hole drainage 7-11-20  Comfortable and stable post op    Follows some commands  Has underlying dementia  Tries to get out of bed  Sitter at bedside    CT barian 7-12-20: Bilateral pneumoencephalus    Summary of relevant labs:7/12/2020    Labs:    BUN 9  Cr 0,52    W 5.2-->8.0  Hb 8.8  Plat 243    Micro:    Imaging:    Discussed with patient, RN, Etta. I have personally reviewed the past medical history, past surgical history, medications, social history, and family history, and I haveupdated the database accordingly. Past Medical History:     Past Medical History:   Diagnosis Date    Dementia ([address]     Essential hypertension     Glaucoma        Past Surgical  History:     Past Surgical History:   Procedure Laterality Date    CRANIOTOMY Right 07/10/2020     CRANIOTOMY, OMAYRA HOLE     TONSILLECTOMY         Medications:      amLODIPine  10 mg Oral Daily    [Held by provider] hydrALAZINE  25 mg Oral 3 times per day    sodium chloride flush  10 mL Intravenous 2 times per day    sodium chloride flush  10 mL Intravenous 2 times per day    ketotifen  1 drop Both Eyes BID    lisinopril  20 mg Oral Daily    docusate sodium  100 mg Oral BID    trimethoprim  100 mg Oral BID    latanoprost  1 drop Both Eyes Nightly    sodium chloride flush  10 mL Intravenous 2 times per day    pantoprazole  40 mg Intravenous Daily    And    sodium chloride (PF)  10 mL Intravenous Daily       Social History:     Social History     Socioeconomic History    Marital status:       Spouse name: Not on file    Number of children: Not on file    Years of education: Not on file    Highest education level: Not on file   Occupational History    Not on file   Social Needs    Financial resource strain: Not on file    Food insecurity     Worry: Not on file     Inability: Not on file    Transportation needs     Medical: Not on file     Non-medical: Not on file   Tobacco Use    Smoking status: Never Smoker   Substance and Sexual Activity    Alcohol use: Not Currently     Comment: socially    Drug use: Not on file    Sexual activity: Not on file   Lifestyle    Physical activity     Days per week: Not on file     Minutes per session: Not on file    Stress: Not on file   Relationships    Social connections     Talks on phone: Not on file     Gets together: Not on file     Attends Tenriism service: Not on file     Active member of club or organization: Not on file     Attends meetings of clubs or organizations: Not on file     Relationship status: Not on file    Intimate partner violence     Fear of current or ex partner: Not on file     Emotionally abused: Not on file     Physically abused: Not on file     Forced sexual activity: Not on file   Other Topics Concern    Not on file   Social History Narrative    Not on file       Family History:     Family History   Problem Relation Age of Onset    Diabetes Mother         Allergies:   Bumetanide; Doxycycline; Levaquin [levofloxacin]; Megestrol; Motrin [ibuprofen]; Pcn [penicillins]; and Sulfa antibiotics     Review of Systems:     Review of Systems   Constitutional: Negative for activity change and appetite change. HENT: Negative for congestion. Eyes: Negative for itching. Respiratory: Negative for apnea. Cardiovascular: Negative for chest pain. Gastrointestinal: Positive for constipation. Negative for abdominal distention. Endocrine: Negative for heat intolerance. Genitourinary: Negative for dysuria. Musculoskeletal: Negative for arthralgias. Skin: Negative for color change. Allergic/Immunologic: Negative for immunocompromised state. Neurological: Negative for dizziness. Hematological: Negative for adenopathy. Psychiatric/Behavioral: Negative for agitation.        Physical Examination :     Patient Vitals for the past 8 hrs:   BP Pulse Resp SpO2   07/12/20 0841 (!) 190/63 -- -- --   07/12/20 0602 132/67 102 20 98 %   07/12/20 0500 (!) 159/59 99 17 98 %   07/12/20 0426 (!) 149/54 105 21 98 %   07/12/20 0403 -- 112 20 99 %   07/12/20 0300 123/88 114 17 98 %   07/12/20 0201 (!) 83/51 116 19 98 %       Physical Exam  Constitutional:       General: She is not in acute distress. Appearance: Normal appearance. She is normal weight. She is not ill-appearing. HENT:      Head: Normocephalic and atraumatic. Nose: Nose normal.      Mouth/Throat:      Mouth: Mucous membranes are moist.      Pharynx: Oropharynx is clear. Eyes:      General: No scleral icterus. Conjunctiva/sclera: Conjunctivae normal.      Pupils: Pupils are equal, round, and reactive to light. Neck:      Musculoskeletal: Neck supple. No neck rigidity. Cardiovascular:      Rate and Rhythm: Normal rate and regular rhythm. Heart sounds: Normal heart sounds. Pulmonary:      Breath sounds: Normal breath sounds. Abdominal:      General: There is no distension. Palpations: There is no mass. Tenderness: There is no abdominal tenderness. Genitourinary:     Comments: No fuchs  Musculoskeletal:         General: No swelling or deformity. Skin:     General: Skin is dry. Coloration: Skin is not jaundiced. Neurological:      General: No focal deficit present. Mental Status: She is alert and oriented to person, place, and time. Mental status is at baseline. Psychiatric:         Mood and Affect: Mood normal.         Thought Content: Thought content normal.           Medical Decision Making:   I have independently reviewed/ordered the following labs:    CBC with Differential:   Recent Labs     07/09/20  1043 07/10/20  0444 07/11/20  0415   WBC 5.2 6.0 8.0   HGB 10.1* 10.5* 8.8*   HCT 32.1* 33.0* 27.6*    295 243   LYMPHOPCT 44*  --  22*   MONOPCT 8  --  11     BMP:  Recent Labs     07/10/20  0444 07/11/20  0415    141   K 4.4 3.8    107   CO2 19* 19*   BUN 11 9   CREATININE 0.68 0.79     Hepatic Function Panel:   Recent Labs     07/10/20  0444   PROT 5.9*   LABALBU 3.6   BILITOT 0.37   ALKPHOS 68   ALT 7   AST 18     No results for input(s): RPR in the last 72 hours. No results for input(s): HIV in the last 72 hours.   No results for input(s): BC in the last 72 hours. Lab Results   Component Value Date    CREATININE 0.79 07/11/2020    GLUCOSE 101 07/11/2020       Detailed results:   Component  Value  Units    CT HEAD WO CONTRAST [[id number]]      Collected: 07/12/20 0840     Updated: 07/12/20 0916     Impression:      1. Bifrontal pneumocephalus, now redistributed from the right side.  Decrease   in mass effect on the right frontal lobe. 2. Near complete resolution of right to left midline shift, now measuring 2.5   mm, previously 9 mm. 3. Trace resolving subarachnoid hemorrhage in the right frontal lobe.  No new   intracranial hemorrhage. Thank you for allowing us to participate in the care of this patient. Please call with questions. This note is created with the assistance of a speech recognition program.  While intending to generate adocument that actually reflects the content of the visit, the document can still have some errors including those of syntax and sound a like substitutions which may escape proof reading. It such instances, actual meaningcan be extrapolated by contextual diversion.     Yelitza Martinez MD  Office: [phone]  Perfect serve / office [phone]

## 2020-07-13 ENCOUNTER — APPOINTMENT (OUTPATIENT)
Dept: CT IMAGING | Age: 85
DRG: 025 | End: 2020-07-13
Payer: MEDICARE

## 2020-07-13 LAB
ANION GAP SERPL CALCULATED.3IONS-SCNC: 20 MMOL/L (ref 9–17)
BUN BLDV-MCNC: 6 MG/DL (ref 8–23)
BUN/CREAT BLD: ABNORMAL (ref 9–20)
CALCIUM SERPL-MCNC: 8.2 MG/DL (ref 8.6–10.4)
CHLORIDE BLD-SCNC: 106 MMOL/L (ref 98–107)
CO2: 16 MMOL/L (ref 20–31)
CREAT SERPL-MCNC: 0.7 MG/DL (ref 0.5–0.9)
D-DIMER QUANTITATIVE: 0.59 MG/L FEU
GFR AFRICAN AMERICAN: >60 ML/MIN
GFR NON-AFRICAN AMERICAN: >60 ML/MIN
GFR SERPL CREATININE-BSD FRML MDRD: ABNORMAL ML/MIN/{1.73_M2}
GFR SERPL CREATININE-BSD FRML MDRD: ABNORMAL ML/MIN/{1.73_M2}
GLUCOSE BLD-MCNC: 108 MG/DL (ref 70–99)
MYOGLOBIN: 451 NG/ML (ref 25–58)
POTASSIUM SERPL-SCNC: 3.8 MMOL/L (ref 3.7–5.3)
SODIUM BLD-SCNC: 142 MMOL/L (ref 135–144)
TROPONIN INTERP: ABNORMAL
TROPONIN T: ABNORMAL NG/ML
TROPONIN, HIGH SENSITIVITY: 73 NG/L (ref 0–14)

## 2020-07-13 PROCEDURE — 84484 ASSAY OF TROPONIN QUANT: CPT

## 2020-07-13 PROCEDURE — 6370000000 HC RX 637 (ALT 250 FOR IP): Performed by: NURSE PRACTITIONER

## 2020-07-13 PROCEDURE — 2580000003 HC RX 258: Performed by: FAMILY MEDICINE

## 2020-07-13 PROCEDURE — 6360000002 HC RX W HCPCS: Performed by: STUDENT IN AN ORGANIZED HEALTH CARE EDUCATION/TRAINING PROGRAM

## 2020-07-13 PROCEDURE — 6360000002 HC RX W HCPCS: Performed by: REGISTERED NURSE

## 2020-07-13 PROCEDURE — 6370000000 HC RX 637 (ALT 250 FOR IP): Performed by: INTERNAL MEDICINE

## 2020-07-13 PROCEDURE — 2580000003 HC RX 258: Performed by: REGISTERED NURSE

## 2020-07-13 PROCEDURE — 2500000003 HC RX 250 WO HCPCS: Performed by: INTERNAL MEDICINE

## 2020-07-13 PROCEDURE — 6370000000 HC RX 637 (ALT 250 FOR IP): Performed by: REGISTERED NURSE

## 2020-07-13 PROCEDURE — 83874 ASSAY OF MYOGLOBIN: CPT

## 2020-07-13 PROCEDURE — 99232 SBSQ HOSP IP/OBS MODERATE 35: CPT | Performed by: INTERNAL MEDICINE

## 2020-07-13 PROCEDURE — 76937 US GUIDE VASCULAR ACCESS: CPT

## 2020-07-13 PROCEDURE — APPSS15 APP SPLIT SHARED TIME 0-15 MINUTES: Performed by: NURSE PRACTITIONER

## 2020-07-13 PROCEDURE — C9113 INJ PANTOPRAZOLE SODIUM, VIA: HCPCS | Performed by: REGISTERED NURSE

## 2020-07-13 PROCEDURE — 70450 CT HEAD/BRAIN W/O DYE: CPT

## 2020-07-13 PROCEDURE — 85379 FIBRIN DEGRADATION QUANT: CPT

## 2020-07-13 PROCEDURE — 80048 BASIC METABOLIC PNL TOTAL CA: CPT

## 2020-07-13 PROCEDURE — 6370000000 HC RX 637 (ALT 250 FOR IP): Performed by: FAMILY MEDICINE

## 2020-07-13 PROCEDURE — 2580000003 HC RX 258: Performed by: STUDENT IN AN ORGANIZED HEALTH CARE EDUCATION/TRAINING PROGRAM

## 2020-07-13 PROCEDURE — 2060000000 HC ICU INTERMEDIATE R&B

## 2020-07-13 PROCEDURE — 99232 SBSQ HOSP IP/OBS MODERATE 35: CPT | Performed by: FAMILY MEDICINE

## 2020-07-13 PROCEDURE — 36415 COLL VENOUS BLD VENIPUNCTURE: CPT

## 2020-07-13 RX ORDER — LANOLIN ALCOHOL/MO/W.PET/CERES
325 CREAM (GRAM) TOPICAL 2 TIMES DAILY WITH MEALS
Status: DISCONTINUED | OUTPATIENT
Start: 2020-07-13 | End: 2020-07-16 | Stop reason: HOSPADM

## 2020-07-13 RX ORDER — UREA 10 %
3 LOTION (ML) TOPICAL ONCE
Status: COMPLETED | OUTPATIENT
Start: 2020-07-13 | End: 2020-07-13

## 2020-07-13 RX ORDER — 0.9 % SODIUM CHLORIDE 0.9 %
1000 INTRAVENOUS SOLUTION INTRAVENOUS ONCE
Status: COMPLETED | OUTPATIENT
Start: 2020-07-13 | End: 2020-07-13

## 2020-07-13 RX ADMIN — SODIUM CHLORIDE, PRESERVATIVE FREE 10 ML: 5 INJECTION INTRAVENOUS at 20:05

## 2020-07-13 RX ADMIN — SODIUM CHLORIDE, PRESERVATIVE FREE 10 ML: 5 INJECTION INTRAVENOUS at 20:04

## 2020-07-13 RX ADMIN — ACETAMINOPHEN 650 MG: 325 TABLET ORAL at 14:13

## 2020-07-13 RX ADMIN — TRIMETHOPRIM 100 MG: 100 TABLET ORAL at 08:42

## 2020-07-13 RX ADMIN — Medication 10 ML: at 10:20

## 2020-07-13 RX ADMIN — TRIMETHOPRIM 100 MG: 100 TABLET ORAL at 20:10

## 2020-07-13 RX ADMIN — LISINOPRIL 20 MG: 20 TABLET ORAL at 08:42

## 2020-07-13 RX ADMIN — KETOTIFEN FUMARATE 1 DROP: 0.35 SOLUTION/ DROPS OPHTHALMIC at 21:29

## 2020-07-13 RX ADMIN — AMLODIPINE BESYLATE 10 MG: 10 TABLET ORAL at 08:42

## 2020-07-13 RX ADMIN — SODIUM CHLORIDE, POTASSIUM CHLORIDE, SODIUM LACTATE AND CALCIUM CHLORIDE: 600; 310; 30; 20 INJECTION, SOLUTION INTRAVENOUS at 08:42

## 2020-07-13 RX ADMIN — ENOXAPARIN SODIUM 40 MG: 40 INJECTION SUBCUTANEOUS at 08:42

## 2020-07-13 RX ADMIN — DOCUSATE SODIUM 100 MG: 100 CAPSULE, LIQUID FILLED ORAL at 08:42

## 2020-07-13 RX ADMIN — LATANOPROST 1 DROP: 50 SOLUTION OPHTHALMIC at 21:29

## 2020-07-13 RX ADMIN — METOPROLOL TARTRATE 5 MG: 5 INJECTION, SOLUTION INTRAVENOUS at 16:32

## 2020-07-13 RX ADMIN — SODIUM CHLORIDE 1000 ML: 9 INJECTION, SOLUTION INTRAVENOUS at 17:00

## 2020-07-13 RX ADMIN — PANTOPRAZOLE SODIUM 40 MG: 40 INJECTION, POWDER, FOR SOLUTION INTRAVENOUS at 10:20

## 2020-07-13 RX ADMIN — FERROUS SULFATE TAB EC 325 MG (65 MG FE EQUIVALENT) 325 MG: 325 (65 FE) TABLET DELAYED RESPONSE at 17:39

## 2020-07-13 RX ADMIN — Medication 3 MG: at 22:38

## 2020-07-13 ASSESSMENT — ENCOUNTER SYMPTOMS
SINUS PRESSURE: 0
SHORTNESS OF BREATH: 0
NAUSEA: 0
DIARRHEA: 0
COLOR CHANGE: 0
ABDOMINAL PAIN: 0
ABDOMINAL DISTENTION: 0
BLOOD IN STOOL: 0
APNEA: 0
VOICE CHANGE: 0
WHEEZING: 0
CONSTIPATION: 0
COUGH: 0
VOMITING: 0
EYE ITCHING: 0
CONSTIPATION: 1
SORE THROAT: 0

## 2020-07-13 ASSESSMENT — PAIN SCALES - GENERAL
PAINLEVEL_OUTOF10: 0
PAINLEVEL_OUTOF10: 6
PAINLEVEL_OUTOF10: 0

## 2020-07-13 NOTE — PROGRESS NOTES
Neurosurgery MEL/Resident    Daily Progress Note   CC:No chief complaint on file. 7/13/2020  10:05 AM    Chart reviewed. No acute events overnight. No new complaints. Doing well this morning    Vitals:    07/12/20 1903 07/12/20 2003 07/12/20 2353 07/13/20 0706   BP:  (!) 165/61  (!) 166/89   Pulse: 85 112 99    Resp: 22 26  18   Temp:  98.4 °F (36.9 °C)  99.2 °F (37.3 °C)   TempSrc:  Oral  Oral   SpO2: 98% 99%  100%   Weight:       Height:           PE:   AOx2, forgetful of the year  PERRLA, EOMI  VOSS  Drift:  absent    Sensation: intact      Incision: CDI, no drainage or erythema noted, suture noted over drain tube site       Lab Results   Component Value Date    WBC 8.0 07/11/2020    HGB 8.8 (L) 07/11/2020    HCT 27.6 (L) 07/11/2020     07/11/2020    ALT 7 07/10/2020    AST 18 07/10/2020     07/11/2020    K 3.8 07/11/2020     07/11/2020    CREATININE 0.79 07/11/2020    BUN 9 07/11/2020    CO2 19 (L) 07/11/2020    TSH 2.03 07/10/2020    INR 1.0 07/10/2020         A/P     Right SDH and SAH   POD #3 s/p Domingo hole     - PT and OT for mobilization  - Okay to DC home per neurosurgery standpoint   -Follow-up 2 weeks for stitch removal   -Please obtain CT scan without contrast before discharged home    Please contact neurosurgery with any changes in patients neurologic status.        Claudene Kirks, CNP  7/13/20  10:05 AM

## 2020-07-13 NOTE — PROGRESS NOTES
Intravenous, 2 times per day  ketotifen, 1 drop, Both Eyes, BID  lisinopril, 20 mg, Oral, Daily  docusate sodium, 100 mg, Oral, BID  trimethoprim, 100 mg, Oral, BID  latanoprost, 1 drop, Both Eyes, Nightly  sodium chloride flush, 10 mL, Intravenous, 2 times per day  pantoprazole, 40 mg, Intravenous, Daily    And  sodium chloride (PF), 10 mL, Intravenous, Daily        Review of Systems   Review of Systems   Constitutional: Negative for activity change, appetite change, chills, fatigue, fever and unexpected weight change. HENT: Negative for congestion, mouth sores, postnasal drip, sinus pressure, sore throat and voice change. Eyes: Negative for visual disturbance. Respiratory: Negative for cough, shortness of breath and wheezing. Cardiovascular: Negative for chest pain and palpitations. Gastrointestinal: Negative for abdominal pain, blood in stool, constipation, diarrhea, nausea and vomiting. Endocrine: Negative for polyuria. Genitourinary: Negative for difficulty urinating, dysuria, frequency and urgency. Musculoskeletal: Negative for arthralgias, joint swelling and myalgias. Neurological: Negative for dizziness, tremors, speech difficulty, light-headedness and headaches. Psychiatric/Behavioral: Positive for confusion.      Objective :      Current Vitals : Temp: 99.2 °F (37.3 °C),  Pulse: 99, Resp: 18, BP: (!) 166/89, SpO2: 100 %  Last 24 Hrs Vitals   Patient Vitals for the past 24 hrs:   BP Temp Temp src Pulse Resp SpO2   07/13/20 0706 (!) 166/89 99.2 °F (37.3 °C) Oral -- 18 100 %   07/12/20 2353 -- -- -- 99 -- --   07/12/20 2003 (!) 165/61 98.4 °F (36.9 °C) Oral 112 26 99 %   07/12/20 1903 -- -- -- 85 22 98 %   07/12/20 1901 (!) 106/36 -- -- 85 17 98 %   07/12/20 1843 107/70 -- -- 110 22 98 %   07/12/20 1625 -- 98 °F (36.7 °C) Oral 113 26 99 %   07/12/20 1603 (!) 98/35 -- -- 89 15 99 %   07/12/20 1514 (!) 167/66 -- -- 118 24 99 %   07/12/20 1402 (!) 116/36 -- -- 87 21 96 %   07/12/20 1312 (!) 131/45 -- -- 101 24 98 %   07/12/20 1303 (!) 157/48 -- -- 109 23 99 %   07/12/20 1202 (!) 145/93 98 °F (36.7 °C) Oral 112 20 99 %   07/12/20 1102 (!) 143/116 -- -- 116 24 99 %   07/12/20 1000 (!) 105/37 -- -- 90 17 98 %   07/12/20 0955 133/60 -- -- 103 19 99 %     Intake / output   07/12 0701 - 07/13 0700  In: 0068 [I.V.:1040]  Out: 1836 [Urine:1750; Drains:86]  Physical Exam:  Physical Exam  Vitals signs and nursing note reviewed. Constitutional:       General: She is not in acute distress. Appearance: She is not diaphoretic. HENT:      Head: Normocephalic and atraumatic. Comments: R sided [place] hole wound      Nose:      Right Sinus: No maxillary sinus tenderness or frontal sinus tenderness. Left Sinus: No maxillary sinus tenderness or frontal sinus tenderness. Mouth/Throat:      Pharynx: No oropharyngeal exudate. Eyes:      General: No scleral icterus. Conjunctiva/sclera: Conjunctivae normal.      Pupils: Pupils are equal, round, and reactive to light. Neck:      Musculoskeletal: Full passive range of motion without pain and neck supple. Thyroid: No thyromegaly. Vascular: No JVD. Cardiovascular:      Rate and Rhythm: Normal rate and regular rhythm. Pulses:           Dorsalis pedis pulses are 2+ on the right side and 2+ on the left side. Heart sounds: Normal heart sounds. No murmur. Pulmonary:      Effort: Pulmonary effort is normal.      Breath sounds: Normal breath sounds. No wheezing or rales. Abdominal:      Palpations: Abdomen is soft. There is no mass. Tenderness: There is no abdominal tenderness. Lymphadenopathy:      Head:      Right side of head: No submandibular adenopathy. Left side of head: No submandibular adenopathy. Cervical: No cervical adenopathy. Skin:     General: Skin is warm. Neurological:      Mental Status: She is alert and oriented to person, place, and time. Motor: No tremor.    Psychiatric:         Behavior: Contrast    Result Date: 7/10/2020  1. 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 Pneumocephalus largely replaces the fluid with increased mass effect upon the right frontal convexity. However, there is no significant change in right to left subfalcine herniation of up to 0.9 cm. 2. Unchanged subarachnoid hemorrhage along the right frontal convexity. Ct Head Wo Contrast    Result Date: 7/9/2020  1. Right-sided subdural fluid collection of 15 mm with some attenuation of the right lateral ventricle and leftward midline shift of 2.4 mm. 2.  Mild right frontotemporal subarachnoid hemorrhage. 3.  Senescent changes including chronic microvascular change. Critical results were called by Dr. Bob Flores MD to [address] on 7/9/2020 at 10:34. Xr Chest Portable    Result Date: 7/9/2020  No acute cardiopulmonary disease. Clinical Course : gradually improving  Assessment and Plan  :        1. Right subdural hematoma s/p craniotomy leyla hole and drain placement postop day #3 -management per neurosurgery. DVT prophylaxis with Lovenox 40 mg daily. 2. Severe chronic anemia - with acute worsening - transfuse if HGB < 7 . Fluid resuscitation. 3. Sinus tachycardia - secondary to pain and anemia  , check Trop and d dimer and electrolytes  fluid bolus . Lopressor PRN   4. Essential hypertension - controlled with lopressor. 5. Dementia - baseline     Discharge planning . precert pending     Continue to monitor vitals , Intake / output ,  Cell count , HGB , Kidney function, oxygenation  as indicated . Plan and updates discussed with patient ,  answers  explained to satisfaction.    Plan discussed with Staff rajwinder MAX     (Please note that portions of this note were completed with a voice recognition program. Efforts were made to edit the dictations but occasionally words are mis-transcribed.)      [facility] Davida Goldman MD  7/13/2020

## 2020-07-13 NOTE — FLOWSHEET NOTE
DATE: 2020    NAME: Amanda Mckeon  MRN: [id number]   : 1926    Patient not seen this date for Physical Therapy due to:  [] Blood transfusion in progress  [] Hemodialysis  []  Patient Declined  [] Spine Precautions   [] Strict Bedrest  [] Surgery/ Procedure  [] Testing      [x] Other- pt tachycardic, 130bpm at rest.         [] PT being discontinued at this time. Patient independent. No further needs. [] PT being discontinued at this time as the patient has been transferred to palliative care. No further needs.     Valeriano Blood, PT

## 2020-07-13 NOTE — PROGRESS NOTES
[facility] -   Infectious diseases evaluation  admission date 7/9/2020    reason for consultation:   Suspected COVID    Impression :   Current:  · Past covid - this time but tested negative  · Traumatic subdural hematoma  · Traumatic right frontotemporal subarachnoid bleed    Other:  ·   Discussion / summary of stay / plan of care   ·   Recommendations   · Remove COVID isolation  · post bur hole for SDH and SAH  · Ok for DC-infectious disease signing off    Infection Control Recommendations   · [place] Precautions    Antimicrobial Stewardship Recommendations   · Simplification of therapy  · Targeted therapy      Coordination ofOutpatient Care:   · Estimated Length of IV antimicrobials:  · Patient will need Midline / picc Catheter Insertion:   · Patient will need SNF:  · Patient will need outpatient wound care:     History of Present Illness:   Initial history:  Devin Forrest is a 80y.o.-year-old female 80years old lady who fell at home and was brought in because of a right-sided subdural hematoma and mild right frontotemporal subarachnoid hemorrhage. She did test positive for COVID quite a few weeks ago, she was brought in to be retested and came back negative. Her chest x-ray was negative as well for any acute infiltrates, her white count 5.2 and she had no fever  No cough or phlegm. Neurosurgery on board planning for a bur hole procedure, holding all antiplatelets and anticoagulation. Pt is Ox 3 and very appropriate  Bruises over the right arm and both legs  HA seems better    Interval changes  7/13/2020   She feels fine, no fever or chills no abdominal pain, continues to have some headache, is much more appropriate.     Summary of relevant labs:  Labs:  W 5.2  Micro:    Imaging:      I have personally reviewed the past medical history, past surgical history, medications, social history, and family history, and I haveupdated the database Active member of club or organization: Not on file     Attends meetings of clubs or organizations: Not on file     Relationship status: Not on file    Intimate partner violence     Fear of current or ex partner: Not on file     Emotionally abused: Not on file     Physically abused: Not on file     Forced sexual activity: Not on file   Other Topics Concern    Not on file   Social History Narrative    Not on file       Family History:     Family History   Problem Relation Age of Onset    Diabetes Mother         Allergies:   Bumetanide; Doxycycline; Levaquin [levofloxacin]; Megestrol; Motrin [ibuprofen]; Pcn [penicillins]; and Sulfa antibiotics     Review of Systems:     Review of Systems   Constitutional: Negative for activity change and appetite change. HENT: Negative for congestion. Eyes: Negative for itching. Respiratory: Negative for apnea. Cardiovascular: Negative for chest pain. Gastrointestinal: Positive for constipation. Negative for abdominal distention. Endocrine: Negative for heat intolerance, polydipsia and polyphagia. Genitourinary: Negative for dysuria. Musculoskeletal: Negative for arthralgias. Skin: Negative for color change. Allergic/Immunologic: Negative for immunocompromised state. Neurological: Negative for dizziness. Hematological: Negative for adenopathy. Psychiatric/Behavioral: Negative for agitation. Physical Examination :     Patient Vitals for the past 8 hrs:   BP Temp Temp src SpO2   07/13/20 1538 (!) 143/45 98.7 °F (37.1 °C) Oral --   07/13/20 1205 (!) 145/63 99.2 °F (37.3 °C) Oral 99 %       Physical Exam  Constitutional:       General: She is not in acute distress. Appearance: Normal appearance. She is normal weight. She is not ill-appearing. HENT:      Head: Normocephalic and atraumatic. Nose: Nose normal.      Mouth/Throat:      Mouth: Mucous membranes are moist.      Pharynx: Oropharynx is clear.    Eyes:      General: No scleral icterus. Conjunctiva/sclera: Conjunctivae normal.      Pupils: Pupils are equal, round, and reactive to light. Neck:      Musculoskeletal: Neck supple. No neck rigidity. Cardiovascular:      Rate and Rhythm: Normal rate and regular rhythm. Heart sounds: Normal heart sounds. Pulmonary:      Breath sounds: Normal breath sounds. Abdominal:      General: There is no distension. Palpations: There is no mass. Tenderness: There is no abdominal tenderness. Genitourinary:     Comments: No fuchs  Musculoskeletal:         General: No swelling or deformity. Right lower leg: No edema. Left lower leg: No edema. Skin:     General: Skin is dry. Coloration: Skin is not jaundiced. Neurological:      General: No focal deficit present. Mental Status: She is alert and oriented to person, place, and time. Mental status is at baseline. Psychiatric:         Mood and Affect: Mood normal.         Thought Content: Thought content normal.           Medical Decision Making:   I have independently reviewed/ordered the following labs:    CBC with Differential:   Recent Labs     07/11/20  0415   WBC 8.0   HGB 8.8*   HCT 27.6*      LYMPHOPCT 22*   MONOPCT 11     BMP:  Recent Labs     07/11/20  0415      K 3.8      CO2 19*   BUN 9   CREATININE 0.79     Hepatic Function Panel: No results for input(s): PROT, LABALBU, BILIDIR, IBILI, BILITOT, ALKPHOS, ALT, AST in the last 72 hours. No results for input(s): RPR in the last 72 hours. No results for input(s): HIV in the last 72 hours. No results for input(s): BC in the last 72 hours. Lab Results   Component Value Date    CREATININE 0.79 07/11/2020    GLUCOSE 101 07/11/2020       Detailed results: Thank you for allowing us to participate in the care of this patient. Please call with questions.     This note is created with the assistance of a speech recognition program.  While intending to generate adocument that actually reflects the content of the visit, the document can still have some errors including those of syntax and sound a like substitutions which may escape proof reading. It such instances, actual meaningcan be extrapolated by contextual diversion.     Kanchan Molina MD  Office: [phone]  Perfect serve / office [phone]

## 2020-07-14 ENCOUNTER — APPOINTMENT (OUTPATIENT)
Dept: GENERAL RADIOLOGY | Age: 85
DRG: 025 | End: 2020-07-14
Payer: MEDICARE

## 2020-07-14 LAB
ABSOLUTE EOS #: <0.03 K/UL (ref 0–0.44)
ABSOLUTE IMMATURE GRANULOCYTE: 0.03 K/UL (ref 0–0.3)
ABSOLUTE LYMPH #: 0.86 K/UL (ref 1.1–3.7)
ABSOLUTE MONO #: 0.53 K/UL (ref 0.1–1.2)
ALBUMIN SERPL-MCNC: 3 G/DL (ref 3.5–5.2)
ALBUMIN/GLOBULIN RATIO: 1.2 (ref 1–2.5)
ALP BLD-CCNC: 58 U/L (ref 35–104)
ALT SERPL-CCNC: 8 U/L (ref 5–33)
ANION GAP SERPL CALCULATED.3IONS-SCNC: 25 MMOL/L (ref 9–17)
AST SERPL-CCNC: 38 U/L
BASOPHILS # BLD: 0 % (ref 0–2)
BASOPHILS ABSOLUTE: <0.03 K/UL (ref 0–0.2)
BILIRUB SERPL-MCNC: 0.4 MG/DL (ref 0.3–1.2)
BUN BLDV-MCNC: 7 MG/DL (ref 8–23)
BUN/CREAT BLD: ABNORMAL (ref 9–20)
C-REACTIVE PROTEIN: 265 MG/L (ref 0–5)
CALCIUM SERPL-MCNC: 7.9 MG/DL (ref 8.6–10.4)
CHLORIDE BLD-SCNC: 109 MMOL/L (ref 98–107)
CO2: 9 MMOL/L (ref 20–31)
CREAT SERPL-MCNC: 0.63 MG/DL (ref 0.5–0.9)
D-DIMER QUANTITATIVE: 1.36 MG/L FEU
DIFFERENTIAL TYPE: ABNORMAL
EOSINOPHILS RELATIVE PERCENT: 0 % (ref 1–4)
GFR AFRICAN AMERICAN: >60 ML/MIN
GFR NON-AFRICAN AMERICAN: >60 ML/MIN
GFR SERPL CREATININE-BSD FRML MDRD: ABNORMAL ML/MIN/{1.73_M2}
GFR SERPL CREATININE-BSD FRML MDRD: ABNORMAL ML/MIN/{1.73_M2}
GLUCOSE BLD-MCNC: 94 MG/DL (ref 70–99)
HCT VFR BLD CALC: 29.9 % (ref 36.3–47.1)
HEMOGLOBIN: 9.6 G/DL (ref 11.9–15.1)
IMMATURE GRANULOCYTES: 0 %
LACTIC ACID, WHOLE BLOOD: 1.3 MMOL/L (ref 0.7–2.1)
LACTIC ACID: NORMAL MMOL/L
LYMPHOCYTES # BLD: 8 % (ref 24–43)
MAGNESIUM: 1.5 MG/DL (ref 1.6–2.6)
MCH RBC QN AUTO: 33.1 PG (ref 25.2–33.5)
MCHC RBC AUTO-ENTMCNC: 32.1 G/DL (ref 28.4–34.8)
MCV RBC AUTO: 103.1 FL (ref 82.6–102.9)
MONOCYTES # BLD: 5 % (ref 3–12)
NRBC AUTOMATED: 0 PER 100 WBC
PDW BLD-RTO: 14.6 % (ref 11.8–14.4)
PLATELET # BLD: 329 K/UL (ref 138–453)
PLATELET ESTIMATE: ABNORMAL
PMV BLD AUTO: 10.1 FL (ref 8.1–13.5)
POTASSIUM SERPL-SCNC: 3.4 MMOL/L (ref 3.7–5.3)
PROCALCITONIN: 2.08 NG/ML
RBC # BLD: 2.9 M/UL (ref 3.95–5.11)
RBC # BLD: ABNORMAL 10*6/UL
SEDIMENTATION RATE, ERYTHROCYTE: 37 MM (ref 0–30)
SEG NEUTROPHILS: 87 % (ref 36–65)
SEGMENTED NEUTROPHILS ABSOLUTE COUNT: 9.78 K/UL (ref 1.5–8.1)
SODIUM BLD-SCNC: 143 MMOL/L (ref 135–144)
TOTAL PROTEIN: 5.5 G/DL (ref 6.4–8.3)
WBC # BLD: 11.2 K/UL (ref 3.5–11.3)
WBC # BLD: ABNORMAL 10*3/UL

## 2020-07-14 PROCEDURE — 84145 PROCALCITONIN (PCT): CPT

## 2020-07-14 PROCEDURE — 85025 COMPLETE CBC W/AUTO DIFF WBC: CPT

## 2020-07-14 PROCEDURE — 85652 RBC SED RATE AUTOMATED: CPT

## 2020-07-14 PROCEDURE — C9113 INJ PANTOPRAZOLE SODIUM, VIA: HCPCS | Performed by: REGISTERED NURSE

## 2020-07-14 PROCEDURE — 86738 MYCOPLASMA ANTIBODY: CPT

## 2020-07-14 PROCEDURE — 97535 SELF CARE MNGMENT TRAINING: CPT

## 2020-07-14 PROCEDURE — 97166 OT EVAL MOD COMPLEX 45 MIN: CPT

## 2020-07-14 PROCEDURE — 6370000000 HC RX 637 (ALT 250 FOR IP): Performed by: FAMILY MEDICINE

## 2020-07-14 PROCEDURE — 99232 SBSQ HOSP IP/OBS MODERATE 35: CPT | Performed by: FAMILY MEDICINE

## 2020-07-14 PROCEDURE — 6370000000 HC RX 637 (ALT 250 FOR IP): Performed by: INTERNAL MEDICINE

## 2020-07-14 PROCEDURE — 97530 THERAPEUTIC ACTIVITIES: CPT

## 2020-07-14 PROCEDURE — 2580000003 HC RX 258: Performed by: NURSE PRACTITIONER

## 2020-07-14 PROCEDURE — 2060000000 HC ICU INTERMEDIATE R&B

## 2020-07-14 PROCEDURE — 87040 BLOOD CULTURE FOR BACTERIA: CPT

## 2020-07-14 PROCEDURE — 2580000003 HC RX 258: Performed by: REGISTERED NURSE

## 2020-07-14 PROCEDURE — 97162 PT EVAL MOD COMPLEX 30 MIN: CPT

## 2020-07-14 PROCEDURE — 6360000002 HC RX W HCPCS: Performed by: NURSE PRACTITIONER

## 2020-07-14 PROCEDURE — 6360000002 HC RX W HCPCS: Performed by: STUDENT IN AN ORGANIZED HEALTH CARE EDUCATION/TRAINING PROGRAM

## 2020-07-14 PROCEDURE — 2500000003 HC RX 250 WO HCPCS: Performed by: INTERNAL MEDICINE

## 2020-07-14 PROCEDURE — 83735 ASSAY OF MAGNESIUM: CPT

## 2020-07-14 PROCEDURE — APPNB180 APP NON BILLABLE TIME > 60 MINS: Performed by: NURSE PRACTITIONER

## 2020-07-14 PROCEDURE — 36415 COLL VENOUS BLD VENIPUNCTURE: CPT

## 2020-07-14 PROCEDURE — 6370000000 HC RX 637 (ALT 250 FOR IP): Performed by: REGISTERED NURSE

## 2020-07-14 PROCEDURE — 6360000002 HC RX W HCPCS: Performed by: REGISTERED NURSE

## 2020-07-14 PROCEDURE — 86140 C-REACTIVE PROTEIN: CPT

## 2020-07-14 PROCEDURE — 6370000000 HC RX 637 (ALT 250 FOR IP): Performed by: NURSE PRACTITIONER

## 2020-07-14 PROCEDURE — 80053 COMPREHEN METABOLIC PANEL: CPT

## 2020-07-14 PROCEDURE — 71045 X-RAY EXAM CHEST 1 VIEW: CPT

## 2020-07-14 PROCEDURE — 85379 FIBRIN DEGRADATION QUANT: CPT

## 2020-07-14 PROCEDURE — 83605 ASSAY OF LACTIC ACID: CPT

## 2020-07-14 RX ORDER — 0.9 % SODIUM CHLORIDE 0.9 %
500 INTRAVENOUS SOLUTION INTRAVENOUS ONCE
Status: COMPLETED | OUTPATIENT
Start: 2020-07-14 | End: 2020-07-15

## 2020-07-14 RX ORDER — AMLODIPINE BESYLATE 5 MG/1
5 TABLET ORAL DAILY
Status: DISCONTINUED | OUTPATIENT
Start: 2020-07-15 | End: 2020-07-15

## 2020-07-14 RX ORDER — ALPRAZOLAM 0.25 MG/1
0.25 TABLET ORAL ONCE
Status: COMPLETED | OUTPATIENT
Start: 2020-07-14 | End: 2020-07-14

## 2020-07-14 RX ORDER — VANCOMYCIN HYDROCHLORIDE 1 G/200ML
1000 INJECTION, SOLUTION INTRAVENOUS ONCE
Status: COMPLETED | OUTPATIENT
Start: 2020-07-15 | End: 2020-07-15

## 2020-07-14 RX ADMIN — FERROUS SULFATE TAB EC 325 MG (65 MG FE EQUIVALENT) 325 MG: 325 (65 FE) TABLET DELAYED RESPONSE at 08:12

## 2020-07-14 RX ADMIN — KETOTIFEN FUMARATE 1 DROP: 0.35 SOLUTION/ DROPS OPHTHALMIC at 10:26

## 2020-07-14 RX ADMIN — ENOXAPARIN SODIUM 40 MG: 40 INJECTION SUBCUTANEOUS at 10:02

## 2020-07-14 RX ADMIN — LATANOPROST 1 DROP: 50 SOLUTION OPHTHALMIC at 21:18

## 2020-07-14 RX ADMIN — SODIUM CHLORIDE, PRESERVATIVE FREE 10 ML: 5 INJECTION INTRAVENOUS at 21:26

## 2020-07-14 RX ADMIN — LISINOPRIL 20 MG: 20 TABLET ORAL at 10:01

## 2020-07-14 RX ADMIN — METOPROLOL TARTRATE 5 MG: 5 INJECTION, SOLUTION INTRAVENOUS at 10:04

## 2020-07-14 RX ADMIN — PANTOPRAZOLE SODIUM 40 MG: 40 INJECTION, POWDER, FOR SOLUTION INTRAVENOUS at 10:02

## 2020-07-14 RX ADMIN — KETOTIFEN FUMARATE 1 DROP: 0.35 SOLUTION/ DROPS OPHTHALMIC at 21:18

## 2020-07-14 RX ADMIN — CEFEPIME HYDROCHLORIDE 2 G: 2 INJECTION, POWDER, FOR SOLUTION INTRAVENOUS at 22:55

## 2020-07-14 RX ADMIN — ALPRAZOLAM 0.25 MG: 0.25 TABLET ORAL at 21:23

## 2020-07-14 RX ADMIN — METOPROLOL TARTRATE 25 MG: 25 TABLET ORAL at 21:23

## 2020-07-14 RX ADMIN — SODIUM CHLORIDE 500 ML: 9 INJECTION, SOLUTION INTRAVENOUS at 21:42

## 2020-07-14 RX ADMIN — Medication 10 ML: at 10:02

## 2020-07-14 RX ADMIN — AMLODIPINE BESYLATE 10 MG: 10 TABLET ORAL at 10:01

## 2020-07-14 RX ADMIN — METOPROLOL TARTRATE 5 MG: 5 INJECTION, SOLUTION INTRAVENOUS at 03:46

## 2020-07-14 RX ADMIN — ALPRAZOLAM 0.25 MG: 0.25 TABLET ORAL at 14:20

## 2020-07-14 RX ADMIN — TRIMETHOPRIM 100 MG: 100 TABLET ORAL at 10:02

## 2020-07-14 RX ADMIN — DOCUSATE SODIUM 100 MG: 100 CAPSULE, LIQUID FILLED ORAL at 10:01

## 2020-07-14 ASSESSMENT — ENCOUNTER SYMPTOMS
SINUS PRESSURE: 0
BLOOD IN STOOL: 0
VOMITING: 0
SORE THROAT: 0
ABDOMINAL PAIN: 0
VOICE CHANGE: 0
WHEEZING: 0
NAUSEA: 0
SHORTNESS OF BREATH: 0
DIARRHEA: 0
CONSTIPATION: 0
COUGH: 0

## 2020-07-14 ASSESSMENT — PAIN SCALES - GENERAL: PAINLEVEL_OUTOF10: 0

## 2020-07-14 NOTE — PROGRESS NOTES
Treatment Pain Screening  Intervention List: Patient able to continue with treatment    Orientation     Social/Functional History  Social/Functional History  Type of Home: Facility(Long-term resident of [place])  Active : No  Occupation: Retired  Additional Comments: Pt very confused, poor historian, no family at bedside  Cognition    dementia baseline    Objective             Strength Other  Other: AROM all extremities observed, limited ROM, unable to do MMt d/t limited commands/ confusion     Sensation  Overall Sensation Status: WFL(Pt denies any numbness or tingling)  Bed mobility  Rolling to Left: Maximum assistance  Rolling to Right: Maximum assistance  Supine to Sit: Moderate assistance;2 Person assistance  Sit to Supine: Moderate assistance;2 Person assistance  Scooting: Moderate assistance;2 Person assistance  Transfers  Comment: un safe to attempt,  Ambulation  Ambulation?: No  Stairs/Curb  Stairs?: No     Balance  Posture: Fair  Sitting - Static: Fair  Sitting - Dynamic: Poor  Exercises  Comments: Pt Zac to modA EOB ~8min.  brief changed     Plan   Plan  Times per week: 4-5x/wk  Current Treatment Recommendations: Strengthening, Balance Training, Functional Mobility Training, Transfer Training, Safety Education & Training, Patient/Caregiver Education & Training, Equipment Evaluation, Education, & procurement  Safety Devices  Type of devices: (left with OT in room.)  Restraints  Initially in place: No    AM-PAC Score     AM-PAC Inpatient Mobility without Stair Climbing Raw Score : 7 (07/14/20 1221)  AM-PAC Inpatient without Stair Climbing T-Scale Score : 28.66 (07/14/20 1221)  Mobility Inpatient CMS 0-100% Score: 86.29 (07/14/20 1221)  Mobility Inpatient without Stair CMS G-Code Modifier : CM (07/14/20 1221)       Goals  Short term goals  Time Frame for Short term goals: 10 visits  Short term goal 1: Pt will be zac to EOB  Short term goal 2: Pt will be SBA EOB 2min  Short term goal 3: Pt will be mndA standing static 30sec  Short term goal 4: Pt will be modA stand pivot transfer.        Therapy Time   Individual Concurrent Group Co-treatment   Time In 7395         Time Out 0930         Minutes 26         Timed Code Treatment Minutes: 8 Minutes       Adrianna Euceda, PT

## 2020-07-14 NOTE — PROGRESS NOTES
Occupational Therapy   Occupational Therapy Initial Assessment  Date: 2020   Patient Name: Hugo Smith  MRN: [id number]     : 1926    Date of Service: 2020    Discharge Recommendations:  Patient would benefit from continued therapy after discharge     Assessment   Performance deficits / Impairments: Decreased functional mobility ; Decreased endurance;Decreased ADL status; Decreased balance;Decreased strength;Decreased safe awareness;Decreased high-level IADLs;Decreased cognition;Decreased fine motor control  Prognosis: Fair  Decision Making: Medium Complexity  OT Education: OT Role;Plan of Care;Transfer Training;ADL Adaptive Strategies  REQUIRES OT FOLLOW UP: Yes  Activity Tolerance  Activity Tolerance: Patient limited by fatigue;Treatment limited secondary to decreased cognition  Safety Devices  Safety Devices in place: Yes  Type of devices: All fall risk precautions in place;Call light within reach;Gait belt;Left in bed;Bed alarm in place;Nurse notified  Restraints  Initially in place: No(Telesitter in room)           Patient Diagnosis(es): The encounter diagnosis was SDH (subdural hematoma) ([facility] [address] has a past medical history of Dementia ([facility] [address] Essential hypertension, and Glaucoma. has a past surgical history that includes Tonsillectomy; craniotomy (Right, 07/10/2020); and craniotomy (Right, 7/10/2020).        Restrictions  Restrictions/Precautions  Restrictions/Precautions: General Precautions, Fall Risk, Seizure  Required Braces or Orthoses?: No  Position Activity Restriction  Other position/activity restrictions: up with A    Subjective   General  Patient assessed for rehabilitation services?: Yes  Family / Caregiver Present: No  Diagnosis: Fall, SAH/SDH, leyla hole 7/10  Patient Currently in Pain: Denies(Other times pt c/o H/A's, L index figer \"hurt\" at end of session, difficult to assess as pt is very distractible and confused)  Pain Assessment  Pain Assessment: 0-10  Pain Level: 0  Vital Signs  Patient Currently in Pain: Denies(Other times pt c/o H/A's, L index figer \"hurt\" at end of session, difficult to assess as pt is very distractible and confused)  Social/Functional History  Social/Functional History  Type of Home: Facility(Long-term resident of [facility])  Active : No  Occupation: Retired  Additional Comments: Pt very confused, poor historian, no family at bedside     Objective   Vision: Impaired  Hearing: Exceptions to [facility]  Hearing Exceptions: Hard of hearing/hearing concerns    Orientation  Overall Orientation Status: Impaired  Orientation Level: Oriented to person;Disoriented to place; Disoriented to time;Disoriented to situation     Balance  Sitting Balance: Moderate assistance(Pt sat EOB for ~9 min this date, posterior lean at start of session, SBA at times)  Standing Balance: Unable to assess(comment)(Not attempted d/t confusion/fatigue, unsure of baseline)  ADL  Feeding: Moderate assistance;Verbal cueing; Increased time to complete;Setup;Bringing food to mouth assist;Adaptive utensils (comment)(Pt able to feed self ~2 grapes with assist, pt may have hx of RA, pt dropped cup of water into lap when using RUE, pt sounded gurgly and light coughing noted so food was taken away and RN informed)  Grooming: Setup; Increased time to complete; Moderate assistance;Verbal cueing  UE Bathing: Setup;Verbal cueing; Increased time to complete;Maximum assistance  LE Bathing: Setup;Verbal cueing; Increased time to complete;Maximum assistance  UE Dressing: Setup; Increased time to complete;Verbal cueing;Maximum assistance  LE Dressing: Verbal cueing;Setup; Increased time to complete;Maximum assistance  Toileting: Maximum assistance;Setup; Increased time to complete  Additional Comments: Brief and external female catheter changed while pt supine/rolling in bed, pt very confused which is likely baseline, pt asssited with gown change, became distressed during this activity, however, 2 men were assistng General: NT  LUE Strength Comment: Pt too confused for accurate assessment, L hand appears more affected by arthritis than R hand  RUE Strength  Gross RUE Strength: Exceptions to [facility]  R Shoulder Flex: 3-/5  R Elbow Flex: NT  R Elbow Ext: NT  R Hand General: NT  RUE Strength Comment: Pt too confused for accurate assessment, not following MMT commands         Plan   Plan  Times per week: 3-4x        AM-PAC Score        AM-PAC Inpatient Daily Activity Raw Score: 12 (07/14/20 1013)  AM-PAC Inpatient ADL T-Scale Score : 30.6 (07/14/20 1013)  ADL Inpatient CMS 0-100% Score: 66.57 (07/14/20 1013)  ADL Inpatient CMS G-Code Modifier : CL (07/14/20 1013)    Goals  Short term goals  Time Frame for Short term goals: Pt will by discharge  Short term goal 1: demo ADL UB bathing/dressing activity with 3 vc's, setup, and min A  Short term goal 2: demo ADL LB bathing/dressing activity with 3 vc's, setup, and mod A  Short term goal 3: demo supine<>sit transfer to EOB with min Ax1 with use of railings PRN  Short term goal 4: demo A&Ox2 for 3/4 sessions with 2 options given per question  Short term goal 5: demo activity tolerance for 35 min+  Short term goal 6: notify OTR to update goals as mobility progresses to standing, etc.     Therapy Time   Individual Concurrent Group Co-treatment   Time In 0904         Time Out 0934         Minutes 30         Timed Code Treatment Minutes: 12 Minutes       Maulik Wright OTR/L

## 2020-07-14 NOTE — PROGRESS NOTES
drop, Both Eyes, BID  lisinopril, 20 mg, Oral, Daily  docusate sodium, 100 mg, Oral, BID  trimethoprim, 100 mg, Oral, BID  latanoprost, 1 drop, Both Eyes, Nightly  sodium chloride flush, 10 mL, Intravenous, 2 times per day  pantoprazole, 40 mg, Intravenous, Daily    And  sodium chloride (PF), 10 mL, Intravenous, Daily        Review of Systems   Review of Systems   Constitutional: Positive for activity change, appetite change and fatigue. Negative for chills, fever and unexpected weight change. HENT: Negative for congestion, mouth sores, postnasal drip, sinus pressure, sore throat and voice change. Eyes: Negative for visual disturbance. Respiratory: Negative for cough, shortness of breath and wheezing. Cardiovascular: Negative for chest pain and palpitations. Gastrointestinal: Negative for abdominal pain, blood in stool, constipation, diarrhea, nausea and vomiting. Endocrine: Negative for polyuria. Genitourinary: Negative for difficulty urinating, dysuria, frequency and urgency. Musculoskeletal: Negative for arthralgias, joint swelling and myalgias. Neurological: Negative for dizziness, tremors, speech difficulty, light-headedness and headaches. Psychiatric/Behavioral: Positive for confusion, hallucinations and sleep disturbance.      Objective :      Current Vitals : Temp: 98.4 °F (36.9 °C),  Pulse: 121, Resp: 29, BP: (!) 147/85, SpO2: 100 %  Last 24 Hrs Vitals   Patient Vitals for the past 24 hrs:   BP Temp Temp src Pulse Resp SpO2   07/14/20 0800 (!) 147/85 98.4 °F (36.9 °C) Oral -- -- 100 %   07/14/20 0045 (!) 158/90 97.8 °F (36.6 °C) Oral 121 29 --   07/13/20 2200 126/89 98.8 °F (37.1 °C) Oral 122 25 100 %   07/13/20 1538 (!) 143/45 98.7 °F (37.1 °C) Oral -- -- --   07/13/20 1205 (!) 145/63 99.2 °F (37.3 °C) Oral -- -- 99 %   07/13/20 1027 130/61 -- -- -- -- --     Intake / output   07/13 0701 - 07/14 0700  In: -   Out: 955 [Urine:955]  Physical Exam:  Physical Exam  Vitals signs and nursing note reviewed. Constitutional:       General: She is not in acute distress. Appearance: She is not diaphoretic. HENT:      Head: Normocephalic and atraumatic. Comments: R sided Trout hole wound      Nose:      Right Sinus: No maxillary sinus tenderness or frontal sinus tenderness. Left Sinus: No maxillary sinus tenderness or frontal sinus tenderness. Mouth/Throat:      Pharynx: No oropharyngeal exudate. Eyes:      General: No scleral icterus. Conjunctiva/sclera: Conjunctivae normal.      Pupils: Pupils are equal, round, and reactive to light. Neck:      Musculoskeletal: Full passive range of motion without pain and neck supple. Thyroid: No thyromegaly. Vascular: No JVD. Cardiovascular:      Rate and Rhythm: Regular rhythm. Tachycardia present. Pulses:           Dorsalis pedis pulses are 2+ on the right side and 2+ on the left side. Heart sounds: Normal heart sounds. No murmur. Pulmonary:      Effort: Tachypnea present. Breath sounds: Normal breath sounds. No wheezing or rales. Abdominal:      Palpations: Abdomen is soft. There is no mass. Tenderness: There is no abdominal tenderness. Lymphadenopathy:      Head:      Right side of head: No submandibular adenopathy. Left side of head: No submandibular adenopathy. Cervical: No cervical adenopathy. Skin:     General: Skin is warm. Neurological:      Mental Status: She is disoriented. Motor: Tremor present. Psychiatric:         Attention and Perception: She perceives visual hallucinations. Behavior: Behavior is cooperative. Cognition and Memory: Cognition is impaired. Memory is impaired. Laboratory findings:    No results for input(s): WBC, HGB, HCT, PLT, SEDRATE, INR in the last 72 hours.     Invalid input(s): PT  Recent Labs     07/13/20 1953      K 3.8      CO2 16*   GLUCOSE 108*   BUN 6*   CREATININE 0.70   CALCIUM 8.2*     No results for convexity. However, there is no significant change in right to left subfalcine herniation of up to 0.9 cm. 2. Unchanged subarachnoid hemorrhage along the right frontal convexity. Ct Head Wo Contrast    Result Date: 7/9/2020  1. Right-sided subdural fluid collection of 15 mm with some attenuation of the right lateral ventricle and leftward midline shift of 2.4 mm. 2.  Mild right frontotemporal subarachnoid hemorrhage. 3.  Senescent changes including chronic microvascular change. Critical results were called by Dr. Roderick Bauman MD to [address] on 7/9/2020 at 10:34. Xr Chest Portable    Result Date: 7/9/2020  No acute cardiopulmonary disease. Clinical Course : gradually improving  Assessment and Plan  :        1. Right subdural hematoma s/p craniotomy leyla hole and drain placement postop day #3 -management per neurosurgery. DVT prophylaxis with Lovenox 40 mg daily. 2. Severe chronic anemia - with acute worsening - transfuse if HGB < 7 . Fluid resuscitation. 3. Sinus tachycardia - secondary to pain and anemia. 4. Essential hypertension - controlled with lopressor. 5. Dementia - baseline       Added Xanax. Start lopressor oral . Chest CXR   PT OT   Likely discharge next am       Continue to monitor vitals , Intake / output ,  Cell count , HGB , Kidney function, oxygenation  as indicated . Plan and updates discussed with patient ,  answers  explained to satisfaction.    Plan discussed with Staff Alanis Hurtado RN     (Please note that portions of this note were completed with a voice recognition program. Efforts were made to edit the dictations but occasionally words are mis-transcribed.)      Ginger Mckeon MD  7/14/2020

## 2020-07-14 NOTE — CARE COORDINATION
TRANSITIONAL CARE PLANNING/ [facility] Day: 5    Reason for Admission: Cyndie Morgan 136 (closed head injury), initial encounter [S09.90XA]     Treatment Plan of Care: continues to be tachy 130s-lopressor PRN     Tests/Procedures still needed:     Barriers to Discharge:     Readmission Risk              Risk of Unplanned Readmission:        15            Patient goals/Treatment Preferences/Transitional Plan:   Spoke with Deborah Muñoz at [facility], pt is long term at the [facility] assisted living. The phone to that admissions office is [phone] fax [phone]. Voicemail left with Damaris Rubio in admissions to check if pt is able to return at discharge     36 Spoke with Aicha at [facility]. Pt is able to return to facility when medically stable.      Referrals Made:     Follow Up needed:

## 2020-07-14 NOTE — PLAN OF CARE
Siderails up x 2  Hourly rounding  Call light in reach  Instructed to call for assist before attempting out of bed.   Remains free from falls and accidental injury at this time   Floor free from obstacles  Bed is locked and in lowest position  Adequate lighting provided  Bed alarm on, Fall signs posted

## 2020-07-15 ENCOUNTER — APPOINTMENT (OUTPATIENT)
Dept: GENERAL RADIOLOGY | Age: 85
DRG: 025 | End: 2020-07-15
Payer: MEDICARE

## 2020-07-15 ENCOUNTER — APPOINTMENT (OUTPATIENT)
Dept: CT IMAGING | Age: 85
DRG: 025 | End: 2020-07-15
Payer: MEDICARE

## 2020-07-15 LAB
-: NORMAL
-: NORMAL
AMORPHOUS: NORMAL
AMORPHOUS: NORMAL
ANION GAP SERPL CALCULATED.3IONS-SCNC: 22 MMOL/L (ref 9–17)
BACTERIA: NORMAL
BACTERIA: NORMAL
BILIRUBIN URINE: NEGATIVE
BILIRUBIN URINE: NEGATIVE
BUN BLDV-MCNC: 9 MG/DL (ref 8–23)
BUN/CREAT BLD: ABNORMAL (ref 9–20)
CALCIUM IONIZED: 1.12 MMOL/L (ref 1.13–1.33)
CALCIUM SERPL-MCNC: 7.9 MG/DL (ref 8.6–10.4)
CASTS UA: NORMAL /LPF (ref 0–8)
CASTS UA: NORMAL /LPF (ref 0–8)
CHLORIDE BLD-SCNC: 113 MMOL/L (ref 98–107)
CO2: 9 MMOL/L (ref 20–31)
COLOR: YELLOW
COLOR: YELLOW
COMMENT UA: ABNORMAL
COMMENT UA: ABNORMAL
CREAT SERPL-MCNC: 0.69 MG/DL (ref 0.5–0.9)
CRYSTALS, UA: NORMAL /HPF
CRYSTALS, UA: NORMAL /HPF
EPITHELIAL CELLS UA: NORMAL /HPF (ref 0–5)
EPITHELIAL CELLS UA: NORMAL /HPF (ref 0–5)
GFR AFRICAN AMERICAN: >60 ML/MIN
GFR NON-AFRICAN AMERICAN: >60 ML/MIN
GFR SERPL CREATININE-BSD FRML MDRD: ABNORMAL ML/MIN/{1.73_M2}
GFR SERPL CREATININE-BSD FRML MDRD: ABNORMAL ML/MIN/{1.73_M2}
GLUCOSE BLD-MCNC: 132 MG/DL (ref 70–99)
GLUCOSE URINE: NEGATIVE
GLUCOSE URINE: NEGATIVE
HCT VFR BLD CALC: 26.9 % (ref 36.3–47.1)
HEMOGLOBIN: 8.2 G/DL (ref 11.9–15.1)
KETONES, URINE: ABNORMAL
KETONES, URINE: ABNORMAL
LEGIONELLA PNEUMOPHILIA AG, URINE: NEGATIVE
LEUKOCYTE ESTERASE, URINE: NEGATIVE
LEUKOCYTE ESTERASE, URINE: NEGATIVE
MAGNESIUM: 1.7 MG/DL (ref 1.6–2.6)
MCH RBC QN AUTO: 32.2 PG (ref 25.2–33.5)
MCHC RBC AUTO-ENTMCNC: 30.5 G/DL (ref 28.4–34.8)
MCV RBC AUTO: 105.5 FL (ref 82.6–102.9)
MUCUS: NORMAL
MUCUS: NORMAL
MYCOPLASMA PNEUMONIAE IGM: 0.11
NITRITE, URINE: NEGATIVE
NITRITE, URINE: NEGATIVE
NRBC AUTOMATED: 0 PER 100 WBC
OTHER OBSERVATIONS UA: NORMAL
OTHER OBSERVATIONS UA: NORMAL
PDW BLD-RTO: 14.9 % (ref 11.8–14.4)
PH UA: 5.5 (ref 5–8)
PH UA: 6 (ref 5–8)
PLATELET # BLD: 229 K/UL (ref 138–453)
PMV BLD AUTO: 10.2 FL (ref 8.1–13.5)
POTASSIUM SERPL-SCNC: 3.4 MMOL/L (ref 3.7–5.3)
PROCALCITONIN: 1.63 NG/ML
PROTEIN UA: ABNORMAL
PROTEIN UA: ABNORMAL
RBC # BLD: 2.55 M/UL (ref 3.95–5.11)
RBC UA: NORMAL /HPF (ref 0–4)
RBC UA: NORMAL /HPF (ref 0–4)
RENAL EPITHELIAL, UA: NORMAL /HPF
RENAL EPITHELIAL, UA: NORMAL /HPF
SODIUM BLD-SCNC: 144 MMOL/L (ref 135–144)
SPECIFIC GRAVITY UA: 1.02 (ref 1–1.03)
SPECIFIC GRAVITY UA: 1.02 (ref 1–1.03)
TRICHOMONAS: NORMAL
TRICHOMONAS: NORMAL
TURBIDITY: CLEAR
TURBIDITY: CLEAR
URINE HGB: ABNORMAL
URINE HGB: ABNORMAL
UROBILINOGEN, URINE: NORMAL
UROBILINOGEN, URINE: NORMAL
WBC # BLD: 6.1 K/UL (ref 3.5–11.3)
WBC UA: NORMAL /HPF (ref 0–5)
WBC UA: NORMAL /HPF (ref 0–5)
YEAST: NORMAL
YEAST: NORMAL

## 2020-07-15 PROCEDURE — 6360000002 HC RX W HCPCS: Performed by: REGISTERED NURSE

## 2020-07-15 PROCEDURE — 85027 COMPLETE CBC AUTOMATED: CPT

## 2020-07-15 PROCEDURE — 6360000002 HC RX W HCPCS: Performed by: FAMILY MEDICINE

## 2020-07-15 PROCEDURE — 81001 URINALYSIS AUTO W/SCOPE: CPT

## 2020-07-15 PROCEDURE — 84145 PROCALCITONIN (PCT): CPT

## 2020-07-15 PROCEDURE — 2500000003 HC RX 250 WO HCPCS: Performed by: FAMILY MEDICINE

## 2020-07-15 PROCEDURE — 2580000003 HC RX 258: Performed by: REGISTERED NURSE

## 2020-07-15 PROCEDURE — 99232 SBSQ HOSP IP/OBS MODERATE 35: CPT | Performed by: FAMILY MEDICINE

## 2020-07-15 PROCEDURE — 87449 NOS EACH ORGANISM AG IA: CPT

## 2020-07-15 PROCEDURE — 74176 CT ABD & PELVIS W/O CONTRAST: CPT

## 2020-07-15 PROCEDURE — 6360000002 HC RX W HCPCS: Performed by: NURSE PRACTITIONER

## 2020-07-15 PROCEDURE — 2500000003 HC RX 250 WO HCPCS: Performed by: INTERNAL MEDICINE

## 2020-07-15 PROCEDURE — 97530 THERAPEUTIC ACTIVITIES: CPT

## 2020-07-15 PROCEDURE — C9113 INJ PANTOPRAZOLE SODIUM, VIA: HCPCS | Performed by: REGISTERED NURSE

## 2020-07-15 PROCEDURE — 36415 COLL VENOUS BLD VENIPUNCTURE: CPT

## 2020-07-15 PROCEDURE — 83735 ASSAY OF MAGNESIUM: CPT

## 2020-07-15 PROCEDURE — APPNB180 APP NON BILLABLE TIME > 60 MINS: Performed by: NURSE PRACTITIONER

## 2020-07-15 PROCEDURE — 97535 SELF CARE MNGMENT TRAINING: CPT

## 2020-07-15 PROCEDURE — 82330 ASSAY OF CALCIUM: CPT

## 2020-07-15 PROCEDURE — 2060000000 HC ICU INTERMEDIATE R&B

## 2020-07-15 PROCEDURE — 80048 BASIC METABOLIC PNL TOTAL CA: CPT

## 2020-07-15 PROCEDURE — 2580000003 HC RX 258: Performed by: NURSE PRACTITIONER

## 2020-07-15 PROCEDURE — 71045 X-RAY EXAM CHEST 1 VIEW: CPT

## 2020-07-15 PROCEDURE — 2580000003 HC RX 258: Performed by: FAMILY MEDICINE

## 2020-07-15 PROCEDURE — 6360000002 HC RX W HCPCS: Performed by: STUDENT IN AN ORGANIZED HEALTH CARE EDUCATION/TRAINING PROGRAM

## 2020-07-15 RX ORDER — METOPROLOL TARTRATE 5 MG/5ML
2.5 INJECTION INTRAVENOUS EVERY 6 HOURS
Status: DISCONTINUED | OUTPATIENT
Start: 2020-07-15 | End: 2020-07-16 | Stop reason: HOSPADM

## 2020-07-15 RX ORDER — MORPHINE SULFATE 2 MG/ML
1 INJECTION, SOLUTION INTRAMUSCULAR; INTRAVENOUS ONCE
Status: COMPLETED | OUTPATIENT
Start: 2020-07-15 | End: 2020-07-15

## 2020-07-15 RX ORDER — METOPROLOL TARTRATE 50 MG/1
50 TABLET, FILM COATED ORAL 2 TIMES DAILY
Status: DISCONTINUED | OUTPATIENT
Start: 2020-07-15 | End: 2020-07-15

## 2020-07-15 RX ADMIN — VANCOMYCIN HYDROCHLORIDE 1000 MG: 1 INJECTION, SOLUTION INTRAVENOUS at 04:57

## 2020-07-15 RX ADMIN — KETOTIFEN FUMARATE 1 DROP: 0.35 SOLUTION/ DROPS OPHTHALMIC at 08:25

## 2020-07-15 RX ADMIN — PANTOPRAZOLE SODIUM 40 MG: 40 INJECTION, POWDER, FOR SOLUTION INTRAVENOUS at 08:24

## 2020-07-15 RX ADMIN — CEFEPIME HYDROCHLORIDE 2 G: 2 INJECTION, POWDER, FOR SOLUTION INTRAVENOUS at 14:48

## 2020-07-15 RX ADMIN — AZITHROMYCIN MONOHYDRATE 500 MG: 500 INJECTION, POWDER, LYOPHILIZED, FOR SOLUTION INTRAVENOUS at 03:46

## 2020-07-15 RX ADMIN — ENOXAPARIN SODIUM 40 MG: 40 INJECTION SUBCUTANEOUS at 08:24

## 2020-07-15 RX ADMIN — Medication 10 ML: at 08:25

## 2020-07-15 RX ADMIN — AZITHROMYCIN MONOHYDRATE 500 MG: 500 INJECTION, POWDER, LYOPHILIZED, FOR SOLUTION INTRAVENOUS at 23:14

## 2020-07-15 RX ADMIN — Medication: at 11:04

## 2020-07-15 RX ADMIN — LATANOPROST 1 DROP: 50 SOLUTION OPHTHALMIC at 20:19

## 2020-07-15 RX ADMIN — METOPROLOL TARTRATE 2.5 MG: 1 INJECTION, SOLUTION INTRAVENOUS at 20:25

## 2020-07-15 RX ADMIN — CEFEPIME HYDROCHLORIDE 2 G: 2 INJECTION, POWDER, FOR SOLUTION INTRAVENOUS at 05:52

## 2020-07-15 RX ADMIN — METOPROLOL TARTRATE 5 MG: 5 INJECTION, SOLUTION INTRAVENOUS at 13:01

## 2020-07-15 RX ADMIN — MORPHINE SULFATE 1 MG: 2 INJECTION, SOLUTION INTRAMUSCULAR; INTRAVENOUS at 03:56

## 2020-07-15 RX ADMIN — CEFEPIME HYDROCHLORIDE 2 G: 2 INJECTION, POWDER, FOR SOLUTION INTRAVENOUS at 21:42

## 2020-07-15 RX ADMIN — KETOTIFEN FUMARATE 1 DROP: 0.35 SOLUTION/ DROPS OPHTHALMIC at 20:19

## 2020-07-15 ASSESSMENT — PAIN SCALES - GENERAL: PAINLEVEL_OUTOF10: 5

## 2020-07-15 NOTE — PROGRESS NOTES
CBC pro-Ronald sed rate CRP blood cultures UA   Imaging: None reviewed chest x-ray which showed bilateral effusions which could not rule out infectious process   Medications: Will start IV Vanco with pharmacy dosing and IV cefepime-we will give a dose of azithromycin for atypical coverage and look for urine Legionella etc. and the urine to continue it       PLAN:     [address] UN[address]/   CODE STATUS was changed by remote NP as she is a DNR comfort care at her facility and it was changed for surgery and never returned back to DNR comfort care.      Consult to ethics committee for further assistance and guidance and planning in the event that she deteriorates-            BRIEF HISTORY/SUMMARY OF STAY PER LAST PROGRESS NOTE/NURSING           VITALS:     Patient Vitals for the past 8 hrs:   BP Temp Temp src Pulse Resp SpO2   07/14/20 2110 -- -- -- -- (!) 35 --   07/14/20 1814 (!) 157/98 100.6 °F (38.1 °C) Axillary 119 22 95 %         Intake/Output Summary (Last 24 hours) at 7/14/2020 2128  Last data filed at 7/14/2020 9485  Gross per 24 hour   Intake --   Output 600 ml   Net -600 ml     Date 07/14/20 0000 - 07/14/20 2359   Shift 8102-5723 5664-8610 1592-0567 24 Hour Total   INTAKE   Shift Total(mL/kg)       OUTPUT   Urine(mL/kg/hr) 600(1.7)   600   Shift Total(mL/kg) 600(13.6)   600(13.6)   Weight (kg) 44.3 44.3 44.3 44.3     Wt Readings from Last 3 Encounters:   07/09/20 97 lb 9.6 oz (44.3 kg)               RECENT LABS/ IMAGING:    Hematology:  Recent Labs     07/13/20 1953   DDIMER 0.59     PT/INR:    Lab Results   Component Value Date    PROTIME 10.7 07/10/2020    INR 1.0 07/10/2020     PTT:    Lab Results   Component Value Date    APTT 23.7 07/10/2020       Comprehensive Metabolic Profile: No results for input(s): PROT, LABALBU, LABA1C, K0LISUM, N2ASEGH, FT4, TSH, AST, ALT, LDH, GGT, ALKPHOS, LABGGT, BILITOT, BILIDIR, AMMONIA, AMYLASE, LIPASE, LACTATE, CHOL, HDL, LDLCHOLESTEROL, (closed head injury), initial encounter 07/09/2020           CURRENT MEDICATIONS:   Scheduled:    metoprolol tartrate  25 mg Oral BID    [START ON 7/15/2020] amLODIPine  5 mg Oral Daily    sodium chloride  500 mL Intravenous Once    vancomycin (VANCOCIN) 1500mg in 250ml D5W IVPB  1,500 mg Intravenous Once    cefepime  2 g Intravenous Q8H    ferrous sulfate  325 mg Oral BID WC    enoxaparin  40 mg Subcutaneous Daily    sodium chloride flush  10 mL Intravenous 2 times per day    sodium chloride flush  10 mL Intravenous 2 times per day    ketotifen  1 drop Both Eyes BID    lisinopril  20 mg Oral Daily    docusate sodium  100 mg Oral BID    latanoprost  1 drop Both Eyes Nightly    sodium chloride flush  10 mL Intravenous 2 times per day    pantoprazole  40 mg Intravenous Daily    And    sodium chloride (PF)  10 mL Intravenous Daily     Continuous Infusions:   PRN Meds: metoprolol, sodium chloride flush, sodium chloride flush, potassium chloride **OR** potassium alternative oral replacement **OR** potassium chloride, magnesium sulfate, acetaminophen **OR** acetaminophen, polyethylene glycol, promethazine **OR** ondansetron, sodium chloride flush          SASKIA Mojica CNP  7/14/2020  9:28 PM

## 2020-07-15 NOTE — FLOWSHEET NOTE
07/15/20 1055   Encounter Summary   Services provided to: Patient   Support System Unknown   Continue Visiting   (7/15/20)   Complexity of Encounter Low   Length of Encounter 15 minutes   Spiritual Assessment Completed Yes   Routine   Type Follow up   Assessment Unable to respond   Intervention Nurtured hope;Prayer;Sustaining presence/ Ministry of presence   Outcome Did not respond

## 2020-07-15 NOTE — PROGRESS NOTES
Occupational Therapy  Facility/Department: [facility]  Daily Treatment Note  NAME: Olla Mohs  : 1926  MRN: [id number]    Date of Service: 7/15/2020    Discharge Recommendations:  Patient would benefit from continued therapy after discharge. Assessment   Performance deficits / Impairments: Decreased functional mobility ; Decreased safe awareness;Decreased balance;Decreased ADL status; Decreased cognition;Decreased posture;Decreased high-level IADLs;Decreased endurance;Decreased strength  Prognosis: Fair  OT Education: OT Role;Transfer Training  Patient Education: purpose of OT; proper hand and foot placement; balance maintaince  Barriers to Learning: pt nena CONRAD carry over req assistance from [address]: Yes  Activity Tolerance  Activity Tolerance: Treatment limited secondary to decreased cognition;Patient limited by fatigue;Patient limited by pain  Safety Devices  Safety Devices in place: Yes(telesitter present)  Type of devices: All fall risk precautions in place; Left in bed;Patient at risk for falls;Call light within reach         Patient Diagnosis(es): The encounter diagnosis was SDH (subdural hematoma) ([facility] [address] has a past medical history of Dementia ([facility] [address] Essential hypertension, and Glaucoma. has a past surgical history that includes Tonsillectomy; craniotomy (Right, 07/10/2020); and craniotomy (Right, 7/10/2020).     Restrictions  Restrictions/Precautions  Restrictions/Precautions: Seizure, Fall Risk, General Precautions  Required Braces or Orthoses?: No  Position Activity Restriction  Other position/activity restrictions: up with A. s/p leyla holes 7/10  Subjective   General  Patient assessed for rehabilitation services?: Yes  Family / Caregiver Present: No  Diagnosis: Fall, SAH/SDH, leyla hole 7/10  General Comment  Comments: pt unable to rate pain or notify where pain is located  Vital Signs  Patient Currently in Pain: Yes   Orientation  Orientation  Overall Orientation Status: Impaired(when asked where pt is pt stated, \"hospital\")  Orientation Level: Oriented to person;Oriented to place; Disoriented to time;Disoriented to situation  Objective    ADL  Grooming: Maximum assistance;Setup;Verbal cueing; Increased time to complete(face washing completed supine in bed req [place] assist)  Balance  Sitting Balance: Maximum assistance(pt req mod A for initial sit increasing to Max A sec to pt confusion and strong L lean)  Standing Balance  Comment: LUIS ALBERTO pt not appropriate to stand at this time sec to increased confusion and decreased activity tolerance  Bed mobility  Rolling to Right: Maximum assistance  Supine to Sit: Maximum assistance  Sit to Supine: Maximum assistance  Scooting: Maximal assistance  Comment: HOB fully elevated pt able to slightly assist with trunk  Cognition  Overall Cognitive Status: Exceptions  Arousal/Alertness: Delayed responses to stimuli;Inconsistent responses to stimuli  Following Commands: Inconsistently follows commands  Attention Span: Difficulty attending to directions  Safety Judgement: Decreased awareness of need for assistance;Decreased awareness of need for safety  Problem Solving: Decreased awareness of errors;Assistance required to generate solutions;Assistance required to identify errors made;Assistance required to correct errors made;Assistance required to implement solutions  Insights: Decreased awareness of deficits  Initiation: Requires cues for all  Sequencing: Requires cues for all     Pt and RN agreeable to therapy this day. ADL task of grooming completed see above for LOF. Pt req continuous redirection, short one step commands. Pt tolerated static sitting for approx 3-4 min then attempting to lay towards [facility]. At session end pt R side lying with BUE elevated, BLE elevated and call light in reach. Telesitter present.      Plan   Plan  Times per week: 3-4x   Cont POC    Goals  Short term goals  Time Frame for Short term goals: Pt will by

## 2020-07-15 NOTE — SIGNIFICANT EVENT
Called back to the patient bedside-   Patient desaturated with good wave form required supplemental oxygen and then NRB to recover saturations. Upon my arrival Patient was less tachycardic, still with same mentation, no acute distress but tacypnea. Nursing states that RT had to NT suction her and he got white secretions with specs of dark gray matter. Nurse also reports that patient was coughing with her pills crushed in apple sauce. She is almost appearing in pain/ uncomfortable. I did provide a 1 mg morphine to see if that slowed the RR down and provide comfort  I attempted to reach out to Neurosurgery Resident to discuss change in status and there was no reply or acknowledgement of the message sent. I did have my collaborating physician Dr Francisco Beltran come to bedside to evaluate to see if there were any other actions to take. We discussed the code status and today's events. I placed call to son at 5 and discussed the previous change in clinical status and starting of the IV antibiotics, in addition to re-instatement of her DNR-CC from this admission that was bypassed for surgery purposes, and then I updated on the furhter decline in respiratory status requiring NRB. He verbalized that he  Understood. I discussed the DNR-CC and that I would continue antibiotics and supportive treatment and keep her comfortable in the likely mchugh of further decline.

## 2020-07-15 NOTE — FLOWSHEET NOTE
was walking down the hallway when he was stopped by a nurse practitoner who had just seen patient. Nurse practitoner requested spiritual care be aware their services my be requested at some point to help be a part of a conversation about patient's DNR status.  informed nurse practitoner he would inform overnight  who was just about to relief him and he would make sure day shift chaplains would be made aware their services may be needed at some point to help be a part of a conversation about patient's DNR status.       Electronically signed by Rickey Ren on 7/14/2020 at 10:05 PM.  [facility]  [phone]

## 2020-07-15 NOTE — PROGRESS NOTES
Physical Therapy  DATE: 7/15/2020    NAME: Jody Ramos  MRN: [id number]   : 1926    Patient not seen this date for Physical Therapy due to:  [] Blood transfusion in progress  [] Hemodialysis  []  Patient Declined  [] Spine Precautions   [] Strict Bedrest  [] Surgery/ Procedure  [] Testing      [x] Other---not appropriate at this time due to tachy 128 and RR 41, while resting supine in bed. Will check back later as time allows        [] PT being discontinued at this time. Patient independent. No further needs. [] PT being discontinued at this time as the patient has been transferred to palliative care. No further needs.     Ariane , PTA

## 2020-07-15 NOTE — PROGRESS NOTES
Pharmacy Note  Vancomycin Consult    Lex Martinez is a 80 y.o. female started on Vancomycin for Pneumonia; consult received from Dr. Cecelia Danielson per NP Avery Schroeder to manage therapy. Also receiving the following antibiotics: Cefepime. Patient Active Problem List   Diagnosis    CHI (closed head injury), initial encounter    Subarachnoid hemorrhage (HCC)    SDH (subdural hematoma) (HCC)    Glaucoma    Hypertensive urgency    Alzheimer disease ([facility]    Essential hypertension    Macrocytic anemia       Allergies:  Bumetanide; Doxycycline; Levaquin [levofloxacin]; Megestrol; Motrin [ibuprofen]; Pcn [penicillins]; and Sulfa antibiotics     Temp max: 99.4    Recent Labs     07/13/20 1953   BUN 6*       Recent Labs     07/13/20 1953   CREATININE 0.70       No results for input(s): WBC in the last 72 hours. Intake/Output Summary (Last 24 hours) at 7/14/2020 8731  Last data filed at 7/14/2020 0607  Gross per 24 hour   Intake --   Output 600 ml   Net -600 ml       Culture Date      Source                       Results      Ht Readings from Last 1 Encounters:   07/09/20 5' 1\" (1.549 m)        Wt Readings from Last 1 Encounters:   07/09/20 97 lb 9.6 oz (44.3 kg)         Body mass index is 18.44 kg/m². CrCl cannot be calculated (Unknown ideal weight.). Goal Trough Level: 15-19 mcg/mL    Assessment/Plan:  Will initiate Vancomycin with a one time loading dose of 1000 mg x1, followed by 500 mg IV every 24 hours. Timing of trough level will be determined based on culture results, renal function, and clinical response. Thank you for the consult. Will continue to follow. Leandro Mancera Pharm. D.    7/14/2020  9:58 PM

## 2020-07-15 NOTE — PLAN OF CARE
Patient assessed for fall risk and fall precautions initiated as needed. Patient instructed about safety devices and allowed to make decisions related to safey. Environment kept free of clutter and adequate lighting provided. Bed in lowest position with brakes locked. Call light in reach. Patient ID band correct and in place. Patient transferred with appropriate methods. Patient free of falls during shift. Will continue to monitor. .. Albaro Gonsalves RN

## 2020-07-15 NOTE — PROGRESS NOTES
[facility]      Daily Progress Note     Admit Date: 7/9/2020  Bed/Room No.  9572/1149-56  Admitting Physician : Mara Ramos MD  Code Status :[facility] Day:  LOS: 6 days   Chief Complaint:     Fall    Principal Problem:    SDH (subdural hematoma) ([facility] [address]  Active Problems:    Subarachnoid hemorrhage (HCC)    Hypertensive urgency    Glaucoma    Alzheimer disease ([facility] [address]    Essential hypertension    Macrocytic anemia    CHI (closed head injury), initial encounter  Resolved Problems:    * No resolved hospital problems. *    Subjective : Interval History/Significant events :  07/15/20    Patient continues to have tachycardia, tachypnea. She is breathing on room air. Patient is having hallucinations. She has poor oral intake. Vitals - Stable afebrile, Tmax 99.2   Labs -severe metabolic acidosis, T-max 85.0. Blood pressure stable. Normal white count, low hemoglobin. Nursing notes , Consults notes reviewed. Overnight events and updates discussed with Nursing staff . Background History:         Carine Mora is 80 y.o. female  Who was admitted to the hospital on 7/9/2020 for treatment of SDH (subdural hematoma) ([facility] [address] secondary to fall. Patient underwent craniotomy with  right bur hole, subdural drain on 7/10/2020. Patient has underlying history of hypertension, dementia. She has been having persistent tachycardia. Blood pressure is high. Patient is DNR CC. She has underlying history of dementia, essential hypertension, glaucoma. PMH:  Past Medical History:   Diagnosis Date    [facility]     Essential hypertension     Glaucoma       Allergies:    Allergies   Allergen Reactions    Bumetanide     Doxycycline     Levaquin [Levofloxacin]     Megestrol     Motrin [Ibuprofen]     Pcn [Penicillins]      Tolerates cephalosporin    Sulfa Antibiotics       Medications :  metoprolol tartrate, 25 mg, Oral, BID  amLODIPine, 5 mg, Oral, Daily  cefepime, 2 g, Intravenous, Q8H  [START ON 7/16/2020] vancomycin, 500 mg, Intravenous, Q24H  vancomycin (VANCOCIN) intermittent dosing (placeholder), , Other, RX Placeholder  azithromycin, 500 mg, Intravenous, Q24H  ferrous sulfate, 325 mg, Oral, BID WC  enoxaparin, 40 mg, Subcutaneous, Daily  sodium chloride flush, 10 mL, Intravenous, 2 times per day  sodium chloride flush, 10 mL, Intravenous, 2 times per day  ketotifen, 1 drop, Both Eyes, BID  lisinopril, 20 mg, Oral, Daily  docusate sodium, 100 mg, Oral, BID  latanoprost, 1 drop, Both Eyes, Nightly  sodium chloride flush, 10 mL, Intravenous, 2 times per day  pantoprazole, 40 mg, Intravenous, Daily    And  sodium chloride (PF), 10 mL, Intravenous, Daily        Review of Systems   Review of Systems   Constitutional: Positive for activity change, appetite change and fatigue. Psychiatric/Behavioral: Positive for agitation, behavioral problems, confusion and decreased concentration. Objective :      Current Vitals : Temp: 97.9 °F (36.6 °C),  Pulse: 94, Resp: 29, BP: 132/64, SpO2: 100 %  Last 24 Hrs Vitals   Patient Vitals for the past 24 hrs:   BP Temp Temp src Pulse Resp SpO2   07/15/20 0400 132/64 97.9 °F (36.6 °C) Axillary 94 29 100 %   07/15/20 0150 (!) 141/56 -- -- 100 (!) 33 --   07/15/20 0000 (!) 146/84 -- -- 107 (!) 39 --   07/14/20 2110 (!) 140/60 99.4 °F (37.4 °C) Axillary 122 (!) 35 --   07/14/20 1814 (!) 157/98 100.6 °F (38.1 °C) Axillary 119 22 95 %   07/14/20 1100 -- 97.6 °F (36.4 °C) Oral -- -- 100 %     Intake / output   07/14 0701 - 07/15 0700  In: -   Out: 200 [Urine:200]  Physical Exam:  Physical Exam  Vitals signs and nursing note reviewed. Constitutional:       General: She is in acute distress. Appearance: She is ill-appearing. She is not diaphoretic. HENT:      Head: Normocephalic and atraumatic. Comments: R sided Domingo hole wound      Nose:      Right Sinus: No maxillary sinus tenderness or frontal sinus tenderness.       Left Sinus: No maxillary sinus tenderness or frontal sinus tenderness. Mouth/Throat:      Pharynx: No oropharyngeal exudate. Eyes:      General: No scleral icterus. Conjunctiva/sclera: Conjunctivae normal.      Pupils: Pupils are equal, round, and reactive to light. Neck:      Musculoskeletal: Full passive range of motion without pain and neck supple. Thyroid: No thyromegaly. Vascular: No JVD. Cardiovascular:      Rate and Rhythm: Regular rhythm. Tachycardia present. Pulses:           Dorsalis pedis pulses are 2+ on the right side and 2+ on the left side. Heart sounds: Normal heart sounds. No murmur. Pulmonary:      Effort: Tachypnea present. Breath sounds: Normal breath sounds. No wheezing or rales. Abdominal:      Palpations: Abdomen is soft. There is no mass. Tenderness: There is no abdominal tenderness. Lymphadenopathy:      Head:      Right side of head: No submandibular adenopathy. Left side of head: No submandibular adenopathy. Cervical: No cervical adenopathy. Skin:     General: Skin is warm. Neurological:      Mental Status: She is disoriented. Motor: Tremor present. Psychiatric:         Attention and Perception: She perceives visual hallucinations. Behavior: Behavior is uncooperative. Cognition and Memory: Cognition is impaired. Memory is impaired.            Laboratory findings:    Recent Labs     07/14/20  2157 07/15/20  0451   WBC 11.2 6.1   HGB 9.6* 8.2*   HCT 29.9* 26.9*    229   SEDRATE 37*  --      Recent Labs     07/13/20  1953 07/14/20  2157 07/15/20  0451    143 144   K 3.8 3.4* 3.4*    109* 113*   CO2 16* 9* 9*   GLUCOSE 108* 94 132*   BUN 6* 7* 9   CREATININE 0.70 0.63 0.69   MG  --  1.5* 1.7   CALCIUM 8.2* 7.9* 7.9*   CAION  --   --  1.12*     Recent Labs     07/14/20 2157   PROT 5.5*   LABALBU 3.0*   AST 38*   ALT 8   ALKPHOS 58   BILITOT 0.40          Specific Gravity, UA   Date Value Ref Range Status ventricle and leftward midline shift of 2.4 mm. 2.  Mild right frontotemporal subarachnoid hemorrhage. 3.  Senescent changes including chronic microvascular change. Critical results were called by Dr. Bob Flores MD to Lai Best on 7/9/2020 at 10:34. Xr Chest Portable    Result Date: 7/9/2020  No acute cardiopulmonary disease. Clinical Course : gradually improving  Assessment and Plan  :        1. Right subdural hematoma s/p craniotomy leyla hole and drain placement postop day #3 -management per neurosurgery. DVT prophylaxis with Lovenox 40 mg daily. 2. Severe chronic anemia - with acute worsening - transfuse if HGB < 7 . Fluid resuscitation. 3. Bilateral pleural effusion left > right -   4. Left lower lobe pneumonia -empiric vancomycin, cefepime. Follow blood culture. Will de-escalate antibiotics based on culture results. Check urine culture. 5. High anion gap metabolic acidosis -bicarb infusion. Monitor kidney function. 6. Sinus tachycardia - secondary to pain and anemia. 7. Essential hypertension - controlled with lopressor. 8. Dementia - baseline     Check CT abdomen pelvis, chest without contrast.        Continue to monitor vitals , Intake / output ,  Cell count , HGB , Kidney function, oxygenation  as indicated . Plan and updates discussed with patient ,  answers  explained to satisfaction.    Plan discussed with Staff Wanda Alcocer RN     (Please note that portions of this note were completed with a voice recognition program. Efforts were made to edit the dictations but occasionally words are mis-transcribed.)      Gala Dancer, MD  7/15/2020

## 2020-07-15 NOTE — PROGRESS NOTES
Respiratory care evaluation. Received pt on RA, awake and confused, no distress. Pt does not appear to need respiratory therapy at this time. Will assess if needed.

## 2020-07-15 NOTE — CONSULTS
[facility] Ethics Consultation Form  Report to [address] [place] [address]    MR# [id number]   Ethics Committee Representatives: James Vasquez MD  Date of Consult: 7/15/2020   Date of Admission: 7/9/2020     Referral Source (doctor, nurse, etc.)  [address] NP    Medical Background: The patient is a 80 y.o. female who has a past medical history of Dementia ([address] Essential hypertension, and Glaucoma. . Admitted to hospital7/9/2020. CHI (closed head injury), initial encounter [S09.90XA] was the admission diagnosis. She came with AMS and a known subdural for treatment. She was awake and alert though not herself per family and caregivers at [place]. She was known [facility]. What are the requester's ethical concerns? What are his/her recommendations? What decisions need to be made? How soon do they need to be made? The concerns are related to code status changed from [facility] to full code on the day of surgery and changed back to [facility] 4 days later. Patient and family wishes, values and goals: The patient has [facility] paper work. Her son consented for surgery. I do not see a documented conversation regarding code status change. She does not have advanced directive other than [facility] form completed. Process (conversation with patient, family, doctor, nurse, care coordinator, ethics committee member, pastoral care, consultants, risk, others):  I reviewed the chart and have call in to neuro to understand the clinical situation and the questions and concerns they have. Recommendations:  Review the [facility] policy and form for discussion of option to suspend [facility] for surgery/procedure. Patient/legal surrogate can maintain [facility] or suspend it for a variety of lengths of times. Suggest documenting that conversation with surgeon, proceduralist or anesthesia.       Thank you for the opportunity to serve the patient, family and medical team.

## 2020-07-15 NOTE — PROGRESS NOTES
Physician Progress Note      Jada Avilez  CSN #:                  [id number]  :                       1926  ADMIT DATE:       2020 9:28 AM  DISCH DATE:  RESPONDING  PROVIDER #:        Fartun Bermudez MD          QUERY TEXT:    Pt admitted with traumatic subdural hematoma. Pt noted to have metabolic   acidosis confusion and hallucinations on 7/15 PN. If possible, please document   in the progress notes and discharge summary if you are evaluating and / or   treating any of the following: The medical record reflects the following:  Risk Factors: dementia, confusion, hallucinations, SDH  Clinical Indicators: confusion, anion gap- 22-20, K+- 5.9-5.5, CO2- 19-9,   subdural hematoma. Treatment: monitor, crainetomy, leyla hoe with drain, Neurology, neurosurgery    If you have any questions or concerns, please reach out to me at office - [phone], or cell [phone]. i am usually on line or in the office   from 6:30-3:00. Thank Bernard Harding RN, CDI  Options provided:  -- Metabolic encephalopathy  -- Toxic encephalopathy  -- Toxic metabolic encephalopathy  -- Delirium, Please specify cause  -- Refer to Clinical Documentation Reviewer    PROVIDER RESPONSE TEXT:    Patient has delirium.  due to SDH    Query created by: Lior Mcginnis on 7/15/2020 2:35 PM      Electronically signed by:  Fartun Bermudez MD 7/15/2020 3:46 PM

## 2020-07-16 VITALS
TEMPERATURE: 99.2 F | HEIGHT: 61 IN | SYSTOLIC BLOOD PRESSURE: 165 MMHG | HEART RATE: 118 BPM | DIASTOLIC BLOOD PRESSURE: 65 MMHG | WEIGHT: 97.6 LBS | BODY MASS INDEX: 18.43 KG/M2 | RESPIRATION RATE: 36 BRPM | OXYGEN SATURATION: 95 %

## 2020-07-16 PROBLEM — E43 SEVERE MALNUTRITION (HCC): Status: ACTIVE | Noted: 2020-07-16

## 2020-07-16 LAB
ANION GAP SERPL CALCULATED.3IONS-SCNC: 13 MMOL/L (ref 9–17)
BUN BLDV-MCNC: 11 MG/DL (ref 8–23)
BUN/CREAT BLD: ABNORMAL (ref 9–20)
CALCIUM SERPL-MCNC: 7.6 MG/DL (ref 8.6–10.4)
CHLORIDE BLD-SCNC: 108 MMOL/L (ref 98–107)
CO2: 21 MMOL/L (ref 20–31)
CREAT SERPL-MCNC: 0.51 MG/DL (ref 0.5–0.9)
GFR AFRICAN AMERICAN: >60 ML/MIN
GFR NON-AFRICAN AMERICAN: >60 ML/MIN
GFR SERPL CREATININE-BSD FRML MDRD: ABNORMAL ML/MIN/{1.73_M2}
GFR SERPL CREATININE-BSD FRML MDRD: ABNORMAL ML/MIN/{1.73_M2}
GLUCOSE BLD-MCNC: 149 MG/DL (ref 70–99)
HCT VFR BLD CALC: 27.1 % (ref 36.3–47.1)
HEMOGLOBIN: 8.7 G/DL (ref 11.9–15.1)
MCH RBC QN AUTO: 31.8 PG (ref 25.2–33.5)
MCHC RBC AUTO-ENTMCNC: 32.1 G/DL (ref 28.4–34.8)
MCV RBC AUTO: 98.9 FL (ref 82.6–102.9)
NRBC AUTOMATED: 0 PER 100 WBC
PDW BLD-RTO: 14.6 % (ref 11.8–14.4)
PLATELET # BLD: 274 K/UL (ref 138–453)
PMV BLD AUTO: 9.4 FL (ref 8.1–13.5)
POTASSIUM SERPL-SCNC: 2.9 MMOL/L (ref 3.7–5.3)
RBC # BLD: 2.74 M/UL (ref 3.95–5.11)
SODIUM BLD-SCNC: 142 MMOL/L (ref 135–144)
WBC # BLD: 7.2 K/UL (ref 3.5–11.3)

## 2020-07-16 PROCEDURE — 36415 COLL VENOUS BLD VENIPUNCTURE: CPT

## 2020-07-16 PROCEDURE — 85027 COMPLETE CBC AUTOMATED: CPT

## 2020-07-16 PROCEDURE — 6360000002 HC RX W HCPCS: Performed by: NURSE PRACTITIONER

## 2020-07-16 PROCEDURE — 6360000002 HC RX W HCPCS: Performed by: REGISTERED NURSE

## 2020-07-16 PROCEDURE — 2580000003 HC RX 258: Performed by: REGISTERED NURSE

## 2020-07-16 PROCEDURE — 2580000003 HC RX 258: Performed by: FAMILY MEDICINE

## 2020-07-16 PROCEDURE — 99239 HOSP IP/OBS DSCHRG MGMT >30: CPT | Performed by: FAMILY MEDICINE

## 2020-07-16 PROCEDURE — 2500000003 HC RX 250 WO HCPCS: Performed by: FAMILY MEDICINE

## 2020-07-16 PROCEDURE — 6360000002 HC RX W HCPCS: Performed by: FAMILY MEDICINE

## 2020-07-16 PROCEDURE — 2580000003 HC RX 258: Performed by: NURSE PRACTITIONER

## 2020-07-16 PROCEDURE — C9113 INJ PANTOPRAZOLE SODIUM, VIA: HCPCS | Performed by: REGISTERED NURSE

## 2020-07-16 PROCEDURE — 80048 BASIC METABOLIC PNL TOTAL CA: CPT

## 2020-07-16 RX ORDER — MORPHINE SULFATE 10 MG/5ML
2 SOLUTION ORAL
Status: DISCONTINUED | OUTPATIENT
Start: 2020-07-16 | End: 2020-07-16 | Stop reason: HOSPADM

## 2020-07-16 RX ORDER — MORPHINE SULFATE 2 MG/ML
1 INJECTION, SOLUTION INTRAMUSCULAR; INTRAVENOUS ONCE
Status: COMPLETED | OUTPATIENT
Start: 2020-07-16 | End: 2020-07-16

## 2020-07-16 RX ORDER — MORPHINE SULFATE 2 MG/ML
2 INJECTION, SOLUTION INTRAMUSCULAR; INTRAVENOUS
Status: DISCONTINUED | OUTPATIENT
Start: 2020-07-16 | End: 2020-07-16 | Stop reason: HOSPADM

## 2020-07-16 RX ORDER — GLYCOPYRROLATE 1 MG/1
1 TABLET ORAL 3 TIMES DAILY
Status: DISCONTINUED | OUTPATIENT
Start: 2020-07-16 | End: 2020-07-16 | Stop reason: HOSPADM

## 2020-07-16 RX ADMIN — Medication: at 03:14

## 2020-07-16 RX ADMIN — VANCOMYCIN HYDROCHLORIDE 500 MG: 500 INJECTION, POWDER, LYOPHILIZED, FOR SOLUTION INTRAVENOUS at 03:14

## 2020-07-16 RX ADMIN — KETOTIFEN FUMARATE 1 DROP: 0.35 SOLUTION/ DROPS OPHTHALMIC at 08:30

## 2020-07-16 RX ADMIN — Medication 10 ML: at 08:22

## 2020-07-16 RX ADMIN — CEFEPIME HYDROCHLORIDE 2 G: 2 INJECTION, POWDER, FOR SOLUTION INTRAVENOUS at 08:22

## 2020-07-16 RX ADMIN — PANTOPRAZOLE SODIUM 40 MG: 40 INJECTION, POWDER, FOR SOLUTION INTRAVENOUS at 08:22

## 2020-07-16 RX ADMIN — METOPROLOL TARTRATE 2.5 MG: 1 INJECTION, SOLUTION INTRAVENOUS at 06:13

## 2020-07-16 RX ADMIN — MORPHINE SULFATE 1 MG: 2 INJECTION, SOLUTION INTRAMUSCULAR; INTRAVENOUS at 01:49

## 2020-07-16 RX ADMIN — POTASSIUM CHLORIDE 10 MEQ: 7.46 INJECTION, SOLUTION INTRAVENOUS at 06:13

## 2020-07-16 RX ADMIN — MORPHINE SULFATE 2 MG: 2 INJECTION, SOLUTION INTRAMUSCULAR; INTRAVENOUS at 16:27

## 2020-07-16 RX ADMIN — METOPROLOL TARTRATE 2.5 MG: 1 INJECTION, SOLUTION INTRAVENOUS at 00:31

## 2020-07-16 ASSESSMENT — PAIN SCALES - GENERAL
PAINLEVEL_OUTOF10: 0
PAINLEVEL_OUTOF10: 8

## 2020-07-16 ASSESSMENT — ENCOUNTER SYMPTOMS
SHORTNESS OF BREATH: 1
TROUBLE SWALLOWING: 1

## 2020-07-16 NOTE — DISCHARGE INSTR - COC
documented pain score (0-10 scale): Pain Level: 0(Pt crying out in pain, nonverbal)  Last Weight:   Wt Readings from Last 1 Encounters:   20 97 lb 9.6 oz (44.3 kg)     Mental Status:  {IP PT MENTAL STATUS:64959}    IV Access:  { SAWYER IV ACCESS:010276555}    Nursing Mobility/ADLs:  Walking   {CHP DME VNKE:876491778}  Transfer  {CHP DME ZKAQ:345740755}  Bathing  {CHP DME BMSI:893210439}  Dressing  {CHP DME DQUW:907264932}  Toileting  {CHP DME CISA:765986516}  Feeding  {P DME AKVU:624611770}  Med Admin  {P DME LILJ:639940590}  Med Delivery   { SAWYER MED Delivery:966593420}    Wound Care Documentation and Therapy:  Wound 07/15/20 Elbow Anterior; Left (Active)   Number of days: 1        Elimination:  Continence:   · Bowel: {YES / KV:96378}  · Bladder: {YES / XQ:16533}  Urinary Catheter: {Urinary Catheter:537900034}   Colostomy/Ileostomy/Ileal Conduit: {YES / LQ:59571}       Date of Last BM: ***    Intake/Output Summary (Last 24 hours) at 2020 1341  Last data filed at 2020 7674  Gross per 24 hour   Intake --   Output 350 ml   Net -350 ml     I/O last 3 completed shifts:  In: -   Out: 350 [Urine:350]    Safety Concerns:     [address] [employer] Safety Concerns:146396291}    Impairments/Disabilities:      [address] [employer] Impairments/Disabilities:036040510}    Nutrition Therapy:  Current Nutrition Therapy:   [address] [employer] Diet List:097467411}    Routes of Feeding: {[facility] DME Other Feedings:246301964}  Liquids: {Slp liquid thickness:98693}  Daily Fluid Restriction: {CHP DME Yes amt example:995439290}  Last Modified Barium Swallow with Video (Video Swallowing Test): {Done Not Done AQAW:060411007}    Treatments at the Time of Hospital Discharge:   Respiratory Treatments: ***  Oxygen Therapy:  {Therapy; copd oxygen:92905}  Ventilator:    { CC Vent WMAN:922662133}    Rehab Therapies: {THERAPEUTIC INTERVENTION:9458082773}  Weight Bearing Status/Restrictions: { CC Weight Bearin}  Other Medical Equipment (for information only, NOT a DME order):  {EQUIPMENT:927009968}  Other Treatments: ***    Patient's personal belongings (please select all that are sent with patient):  {CHP DME Belongings:122310868}    RN SIGNATURE:  {Esignature:276008392}    CASE MANAGEMENT/SOCIAL WORK SECTION    Inpatient Status Date: ***    Readmission Risk Assessment Score:  Readmission Risk              Risk of Unplanned Readmission:        15           Discharging to Facility/ Agency   · Name:   · Address:  · Phone:  · Fax:    Dialysis Facility (if applicable)   · Name:  · Address:  · Dialysis Schedule:  · Phone:  · Fax:    / signature: {Esignature:645247348}    PHYSICIAN SECTION    Prognosis: Poor    Condition at Discharge: Terminal    Rehab Potential (if transferring to Rehab): Guarded    Recommended Labs or Other Treatments After Discharge: Continue comfort care, oxygen as needed. Further management by hospice. Physician Certification: I certify the above information and transfer of Marietta Aase  is necessary for the continuing treatment of the diagnosis listed and that she requires Hospice for less 30 days.      Update Admission H&P: No change in H&P    PHYSICIAN SIGNATURE:  Electronically signed by Miley Reed MD on 7/16/20 at 1:41 PM EDT

## 2020-07-16 NOTE — PLAN OF CARE
Problem: Falls - Risk of:  Goal: Will remain free from falls  Description: Will remain free from falls  Outcome: Met This Shift  Note: Pt remained safe throughout shift and remained in bed without a fall  Goal: Absence of physical injury  Description: Absence of physical injury  Outcome: Met This Shift     Problem: Skin Integrity:  Goal: Will show no infection signs and symptoms  Description: Will show no infection signs and symptoms  Outcome: Ongoing  Goal: Absence of new skin breakdown  Description: Absence of new skin breakdown  Outcome: Ongoing     Problem: Pain:  Goal: Pain level will decrease  Description: Pain level will decrease  Outcome: Ongoing  Goal: Control of acute pain  Description: Control of acute pain  Outcome: Ongoing  Goal: Control of chronic pain  Description: Control of chronic pain  Outcome: Ongoing

## 2020-07-16 NOTE — PROGRESS NOTES
Diagnosis:   · Severe malnutrition, In context of acute illness or injury related to inadequate protein-energy intake(current medical condition) as evidenced by NPO or clear liquid status due to medical condition, moderate loss of subcutaneous fat, mild muscle loss    Nutrition Interventions:   Food and/or Nutrient Delivery:  Continue NPO  Nutrition Education/Counseling:  No recommendation at this time   Coordination of Nutrition Care:  Continued Inpatient Monitoring    Goals:  Start of nutrition within 24-48 hours. Meet at least 50% of nutrition needs once nutrition initiated. Nutrition Monitoring and Evaluation:   Behavioral-Environmental Outcomes:  (N/A)   Food/Nutrient Intake Outcomes:  Diet Advancement/Tolerance  Physical Signs/Symptoms Outcomes:  Biochemical Data, GI Status, Hemodynamic Status, Nutrition Focused Physical Findings, Weight     Discharge Planning:     Too soon to determine     Electronically signed by Moni Grier RD, LD on 7/16/20 at 3:29 PM EDT    Contact: [phone]

## 2020-07-16 NOTE — DISCHARGE SUMMARY
[facility]      Discharge Summary     Patient ID: Bell Regan  :  1926   MRN: [id number]     ACCOUNT:  [de-identified]   Patient Location : [address] [place] [address]  Patient's PCP: Miguel Ángel Quach MD  Admit Date: 2020   Discharge Date: 2020     Length of Stay: 7  Code Status:  DNR-CC  Admitting Physician: Nestor Medley MD  Discharge Physician: Nestor Medley MD     Active Discharge Diagnosis :     Primary Problem  SDH (subdural hematoma) [facility])      [facility] Problems    Diagnosis Date Noted    Subarachnoid hemorrhage ([facility] [I60.9] 2020     Priority: High    SDH (subdural hematoma) ([email or web address] [S06.5X9A] 2020     Priority: High    Hypertensive urgency [I16.0] 2020     Priority: High    Glaucoma [H40.9] 2020     Priority: Medium    Alzheimer disease ([email or web address] [G30.9, F02.80] 2020     Priority: Medium    Essential hypertension [I10] 2020     Priority: Medium    Macrocytic anemia [D53.9] 2020     Priority: Medium    Severe malnutrition ([facility] [E43] 2020    CHI (closed head injury), initial encounter [S09.90XA] 2020       Admission Condition:  poor     Discharged Condition: terminal     Hospital Stay:     Hospital Course:  Bell Regan is a 80 y.o. female who was admitted for the management of   SDH (subdural hematoma) ([email or web address] Patient underwent craniotomy with  right bur hole, subdural drain on 7/10/2020. Patient has underlying history of hypertension, dementia. She has been having persistent tachycardia. Blood pressure is high. Patient is DNR CC.  family had changed code status for surgery. She has underlying history of dementia, essential hypertension, glaucoma. Patient started to have tachycardia and tachypnea post op day #3 with change in mental status with encephalopathy. She was found to have left lower lobe pneumonia likely aspiration.   Patient also having increased upper airway 07/09/2020    PROTIME 10.7 07/10/2020    PROTIME 10.6 07/09/2020       Ct Abdomen Pelvis Wo Contrast Additional Contrast? None    Result Date: 7/15/2020  1. Examination is significantly degraded by patient motion artifact. 2. Bilateral pleural effusions. Left lower lobe opacification, partially visualized, may represent atelectasis or pneumonia. 3. Small amount of free fluid in the pelvis. 4. Otherwise, no gross acute abnormality in the abdomen or pelvis. 5. Rectal Ortiz catheter tip in the superior gluteal fold, and not within the rectum. 6. L1 vertebral body compression fracture, age indeterminate, but felt to be old. The findings were sent to the Radiology Results [address] at 12:51 pm on 7/15/2020to be communicated to a licensed caregiver. Ct Head Wo Contrast    Result Date: 7/13/2020  Stable right subdural collection. Increased left subdural collection. Stable or slightly improved midline shift. Persistent pneumocephalus. Ct Head Wo Contrast    Result Date: 7/12/2020  1. Bifrontal pneumocephalus, now redistributed from the right side. Decrease in mass effect on the right frontal lobe. 2. Near complete resolution of right to left midline shift, now measuring 2.5 mm, previously 9 mm. 3. Trace resolving subarachnoid hemorrhage in the right frontal lobe. No new intracranial hemorrhage. Ct Head Wo Contrast    Result Date: 7/10/2020  1. 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 Pneumocephalus largely replaces the fluid with increased mass effect upon the right frontal convexity. However, there is no significant change in right to left subfalcine herniation of up to 0.9 cm. 2. Unchanged subarachnoid hemorrhage along the right frontal convexity. Xr Chest Portable    Result Date: 7/15/2020  Stable moderate layering left-sided pleural effusion.      Xr Chest Portable    Result Date: the opportunity to be involved in this patient's care.

## 2020-07-16 NOTE — PROGRESS NOTES
Physical Therapy  DATE: 2020    NAME: Macie Sun  MRN: [id number]   : 1926    Patient not seen this date for Physical Therapy due to:  [] Blood transfusion in progress  [] Hemodialysis  []  Patient Declined  [] Spine Precautions   [] Strict Bedrest  [] Surgery/ Procedure  [] Testing      [x] Other; Per RN  Pt is DNR-CC. Plan to discharge to inpatient hospice       [] PT being discontinued at this time. Patient independent. No further needs. [] PT being discontinued at this time as the patient has been transferred to palliative care. No further needs.     Mary [place], [facility]

## 2020-07-16 NOTE — CARE COORDINATION
Discharge [facility] Case Management Department  Written by: Redd Olmedo RN    Patient Name: Jose Antonio Salgado  Attending Provider: Jim Ya MD  Admit Date: 2020  9:28 AM  MRN: [id number]  Account: [de-identified]                     : 1926  Discharge Date: 2020      [facility]

## 2020-07-16 NOTE — CARE COORDINATION
TRANSITIONAL CARE PLANNING/ [facility] Day: 7    Reason for Admission: Cyndie Morgan 136 (closed head injury), initial encounter [S09.90XA]     Treatment Plan of Care: consult to Hospice    Tests/Procedures still needed: none    Barriers to Discharge: none    Readmission Risk              Risk of Unplanned Readmission:        17            Patient goals/Treatment Preferences/Transitional Plan:   Spoke with pt's son Nafisa Dacosta re: Hospice, freedom choice given he chooses [facility] hosp[facility] ([place])  1240 Referral called and faxed to [facility]. 1510 Received call from Angeles at [facility], she states they have talked with patient's son Nafisa Dacosta and patient has been accepted at [facility] for inpatient.  Transportation is set up for 4:30pm.  Referrals Made: CARLOS allan    Follow Up needed: none

## 2020-07-16 NOTE — PROGRESS NOTES
[facility]      Daily Progress Note     Admit Date: 7/9/2020  Bed/Room No.  8772/1408-87  Admitting Physician : Tyler Sánchez MD  Code Status :[facility] Day:  LOS: 7 days   Chief Complaint:     Fall    Principal Problem:    SDH (subdural hematoma) ([address]  Active Problems:    Subarachnoid hemorrhage ([address]    Hypertensive urgency    Glaucoma    Alzheimer disease ([facility]    Essential hypertension    Macrocytic anemia    CHI (closed head injury), initial encounter  Resolved Problems:    * No resolved hospital problems. *    Subjective : Interval History/Significant events :  07/16/20    Patient has no change in clinical consition . Continues to be encephalopathic. She is not following any commands. Patient has posturing of her upper extremities. No seizure-like activity noted. She is having difficulty eating. Has increased upper airway secretions. Vitals - Stable afebrile, Tmax 99.2   Labs -severe metabolic acidosis, T-max 47.4. Blood pressure stable. Normal white count, low hemoglobin. Nursing notes , Consults notes reviewed. Overnight events and updates discussed with Nursing staff . Background History:         Olla Mohs is 80 y.o. female  Who was admitted to the hospital on 7/9/2020 for treatment of SDH (subdural hematoma) ([facility] secondary to fall. Patient underwent craniotomy with  right bur hole, subdural drain on 7/10/2020. Patient has underlying history of hypertension, dementia. She has been having persistent tachycardia. Blood pressure is high. Patient is DNR CC. She has underlying history of dementia, essential hypertension, glaucoma. PMH:  Past Medical History:   Diagnosis Date    Dementia ([facility]     Essential hypertension     Glaucoma       Allergies:    Allergies   Allergen Reactions    Bumetanide     Doxycycline     Levaquin [Levofloxacin]     Megestrol     Motrin [Ibuprofen]     Pcn [Penicillins]      Tolerates cephalosporin  Sulfa Antibiotics       Medications :  glycopyrrolate, 1 mg, Oral, TID  metoprolol, 2.5 mg, Intravenous, Q6H  vancomycin (VANCOCIN) intermittent dosing (placeholder), , Other, RX Placeholder  ferrous sulfate, 325 mg, Oral, BID WC  sodium chloride flush, 10 mL, Intravenous, 2 times per day  sodium chloride flush, 10 mL, Intravenous, 2 times per day  ketotifen, 1 drop, Both Eyes, BID  docusate sodium, 100 mg, Oral, BID  latanoprost, 1 drop, Both Eyes, Nightly  sodium chloride flush, 10 mL, Intravenous, 2 times per day  pantoprazole, 40 mg, Intravenous, Daily    And  sodium chloride (PF), 10 mL, Intravenous, Daily        Review of Systems   Review of Systems   Constitutional: Positive for activity change, appetite change and fatigue. HENT: Positive for trouble swallowing. Respiratory: Positive for shortness of breath. Neurological: Positive for tremors. Psychiatric/Behavioral: Positive for behavioral problems, confusion and decreased concentration. Negative for agitation. Objective :      Current Vitals : Temp: 99.2 °F (37.3 °C),  Pulse: 93, Resp: 21, BP: 121/81, SpO2: 95 %  Last 24 Hrs Vitals   Patient Vitals for the past 24 hrs:   BP Temp Temp src Pulse Resp SpO2   07/16/20 1100 -- 99.2 °F (37.3 °C) Axillary 93 21 --   07/16/20 0817 121/81 -- -- 108 (!) 32 --   07/16/20 0800 -- -- -- 114 -- --   07/16/20 0732 -- 98.4 °F (36.9 °C) Oral 95 27 --   07/16/20 0420 (!) 151/64 98.8 °F (37.1 °C) Axillary 91 28 95 %   07/16/20 0015 (!) 144/60 99.6 °F (37.6 °C) Axillary 124 (!) 35 96 %   07/15/20 2035 (!) 172/64 99.8 °F (37.7 °C) Axillary 107 (!) 38 96 %   07/15/20 1631 (!) 145/61 100.9 °F (38.3 °C) Axillary 129 30 --   07/15/20 1228 (!) 152/81 99.4 °F (37.4 °C) Axillary 126 (!) 41 --     Intake / output   07/15 0701 - 07/16 0700  In: -   Out: 350 [Urine:350]  Physical Exam:  Physical Exam  Vitals signs and nursing note reviewed. Constitutional:       General: She is in acute distress.       Appearance: She is ill-appearing. She is not diaphoretic. Interventions: Nasal cannula in place. HENT:      Head: Normocephalic and atraumatic. Comments: R sided Clif[place] hole wound      Nose:      Right Sinus: No maxillary sinus tenderness or frontal sinus tenderness. Left Sinus: No maxillary sinus tenderness or frontal sinus tenderness. Mouth/Throat:      Pharynx: No oropharyngeal exudate. Eyes:      General: No scleral icterus. Conjunctiva/sclera: Conjunctivae normal.      Pupils: Pupils are equal, round, and reactive to light. Neck:      Musculoskeletal: Neck supple. Thyroid: No thyromegaly. Vascular: No JVD. Cardiovascular:      Rate and Rhythm: Regular rhythm. Tachycardia present. Pulses:           Dorsalis pedis pulses are 2+ on the right side and 2+ on the left side. Heart sounds: Normal heart sounds. No murmur. Pulmonary:      Effort: Tachypnea present. Breath sounds: Transmitted upper airway sounds present. No wheezing or rales. Abdominal:      Palpations: Abdomen is soft. There is no mass. Tenderness: There is no abdominal tenderness. Lymphadenopathy:      Head:      Right side of head: No submandibular adenopathy. Left side of head: No submandibular adenopathy. Cervical: No cervical adenopathy. Skin:     General: Skin is warm. Neurological:      Mental Status: She is disoriented. Motor: Tremor present. Psychiatric:         Attention and Perception: She perceives visual hallucinations. Behavior: Behavior is uncooperative. Cognition and Memory: Cognition is impaired. Memory is impaired.            Laboratory findings:    Recent Labs     07/14/20  2157 07/15/20  0451 07/16/20  0449   WBC 11.2 6.1 7.2   HGB 9.6* 8.2* 8.7*   HCT 29.9* 26.9* 27.1*    229 274   SEDRATE 37*  --   --      Recent Labs     07/14/20  2157 07/15/20  0451 07/16/20  0449    144 142   K 3.4* 3.4* 2.9*   * 113* 108*   CO2 9* 9* 21 GLUCOSE 94 132* 149*   BUN 7* 9 11   CREATININE 0.63 0.69 0.51   MG 1.5* 1.7  --    CALCIUM 7.9* 7.9* 7.6*   CAION  --  1.12*  --      Recent Labs     07/14/20  2157   PROT 5.5*   LABALBU 3.0*   AST 38*   ALT 8   ALKPHOS 58   BILITOT 0.40          Specific Gravity, UA   Date Value Ref Range Status   07/15/2020 1.018 1.005 - 1.030 Final     Protein, UA   Date Value Ref Range Status   07/15/2020 1+ (A) NEGATIVE Final     RBC, UA   Date Value Ref Range Status   07/15/2020 0 TO 2 0 - 4 /HPF Final     Comment:     Reference range defined for non-centrifuged specimen. Bacteria, UA   Date Value Ref Range Status   07/15/2020 NOT REPORTED None Final     Nitrite, Urine   Date Value Ref Range Status   07/15/2020 NEGATIVE NEGATIVE Final     WBC, UA   Date Value Ref Range Status   07/15/2020 0 TO 2 0 - 5 /HPF Final     Leukocyte Esterase, Urine   Date Value Ref Range Status   07/15/2020 NEGATIVE NEGATIVE Final       Imaging / Clinical Data :-   Xr Humerus Left (min 2 Views)    Result Date: 7/9/2020  No acute osseous abnormality of the left humerus. No acute osseous abnormality of the left knee. Xr Knee Left (3 Views)    Result Date: 7/9/2020  No acute osseous abnormality of the left humerus. No acute osseous abnormality of the left knee. Ct Head Wo Contrast    Result Date: 7/12/2020  1. Bifrontal pneumocephalus, now redistributed from the right side. Decrease in mass effect on the right frontal lobe. 2. Near complete resolution of right to left midline shift, now measuring 2.5 mm, previously 9 mm. 3. Trace resolving subarachnoid hemorrhage in the right frontal lobe. No new intracranial hemorrhage. Ct Head Wo Contrast    Result Date: 7/10/2020  1. 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 Pneumocephalus largely replaces the fluid with increased mass effect upon the right frontal convexity.   However, there is no support, use IV morphine, Robinul, IV Ativan for comfort care. Plan and updates discussed with patient ,  answers  explained to satisfaction.    Plan discussed with Staff Barbara MAX     (Please note that portions of this note were completed with a voice recognition program. Efforts were made to edit the dictations but occasionally words are mis-transcribed.)      Jacy Marx MD  7/16/2020

## 2020-07-20 LAB
CULTURE: NORMAL
Lab: NORMAL
SPECIMEN DESCRIPTION: NORMAL

## 2022-05-16 NOTE — ED PROVIDER NOTES
oriented to person, place and time osteoarthritis. Xr Knee Left (3 Views)    Result Date: 6/20/2020  EXAMINATION: THREE XRAY VIEWS OF THE LEFT KNEE 6/20/2020 3:30 am COMPARISON: None. HISTORY: ORDERING SYSTEM PROVIDED HISTORY: trauma, pain TECHNOLOGIST PROVIDED HISTORY: trauma, pain Reason for Exam: trauma/ fall/ pain Acuity: Acute Type of Exam: Initial FINDINGS: Demineralized osseous structures. Degenerative changes of the knee. Mild prepatellar swelling. A radiodensity associated with the suprapatellar soft tissues of uncertain origin. Please correlate with any penetrating trauma. No acute osseous abnormality. Xr Knee Left (3 Views)    Result Date: 6/20/2020  EXAMINATION: THREE XRAY VIEWS OF THE LEFT KNEE 6/20/2020 12:49 am COMPARISON: None. HISTORY: ORDERING SYSTEM PROVIDED HISTORY: Pain TECHNOLOGIST PROVIDED HISTORY: Pain Reason for Exam: fall Acuity: Acute Type of Exam: Initial FINDINGS: Prepatellar swelling. Joint effusion. No definitive fracture. Degenerative changes. No acute osseous abnormality but osteopenia somewhat limits evaluation. If concern is high for fracture, consider CT. Ct Head Wo Contrast    Result Date: 6/20/2020  EXAMINATION: CT OF THE HEAD WITHOUT CONTRAST  6/20/2020 2:56 am TECHNIQUE: CT of the head was performed without the administration of intravenous contrast. Dose modulation, iterative reconstruction, and/or weight based adjustment of the mA/kV was utilized to reduce the radiation dose to as low as reasonably achievable. COMPARISON: None. HISTORY: ORDERING SYSTEM PROVIDED HISTORY: fall, head contusion TECHNOLOGIST PROVIDED HISTORY: fall, head contusion Reason for Exam: fall Acuity: Acute Type of Exam: Initial FINDINGS: BRAIN/VENTRICLES: Right frontal scattered acute subarachnoid hemorrhage is present. No abnormal extra-axial fluid collection. The gray-white differentiation is maintained without evidence of an acute infarct. There is no evidence of hydrocephalus.  Moderate diffuse decrease There is normal alignment of the spine. T7 vertebral body increased can cavity of the superior endplate is noted with approximately 30% height loss noted. L1 vertebral body 90% height loss is identified. Increased can cavity of the superior L5 endplate is seen with cortical discontinuity visualized consistent with acute compression fracture resulting approximately 25% height loss. No osseous destructive lesion is seen. DEGENERATIVE CHANGES: No gross spinal canal stenosis or bony neural foraminal narrowing of the thoracic spine. SOFT TISSUES: No paraspinal mass is seen. 1. L5 vertebral body acute compression fracture with concavity of the superior endplate resulting approximately 25% height loss. 2. Remote vertebral body compression fractures at levels T7 and L1, as discussed above. Xr Chest Portable    Result Date: 6/20/2020  EXAMINATION: ONE XRAY VIEW OF THE CHEST 6/19/2020 11:56 pm COMPARISON: None. HISTORY: ORDERING SYSTEM PROVIDED HISTORY: trauma TECHNOLOGIST PROVIDED HISTORY: trauma Reason for Exam: fall Acuity: Acute Type of Exam: Initial FINDINGS: Chronic pulmonary changes. No consolidation, effusion, or pneumothorax. The heart is not enlarged. Degenerative changes of the skeleton. Chronic changes without focal airspace consolidation. Ct Chest Abdomen Pelvis Wo Contrast    Result Date: 6/20/2020  EXAMINATION: CT OF THE CHEST, ABDOMEN, AND PELVIS WITHOUT CONTRAST 6/20/2020 2:55 am TECHNIQUE: CT of the chest, abdomen and pelvis was performed without the administration of intravenous contrast. Multiplanar reformatted images are provided for review. Dose modulation, iterative reconstruction, and/or weight based adjustment of the mA/kV was utilized to reduce the radiation dose to as low as reasonably achievable.  COMPARISON: Chest portable June 20, 2020 at 0057 hours HISTORY: ORDERING SYSTEM PROVIDED HISTORY: fall, pain TECHNOLOGIST PROVIDED HISTORY: fall, pain Reason for Exam: fall Acuity:

## 2024-04-02 NOTE — ED NOTES
Patient presents for ARANESP injection today.  Patients Hgb 10.9 today, treatment conditions are hold injection for Hgb >11 for CKD/ESRD.     I am an RN. Does the patient have an active anti-cancer treatment plan? (oral, injection, or IV) No.    Reviewed and verified Advanced Directives: Yes: Advance Directive is in chart     Pre-Injection Information: Allergies reviewed as required for injection type., Pt states feeling well, no complaints., and Pt denies signs and symptoms of infection.    Refer to MAR (medication administration record) for type of injection and medication given.  Needle Size: 25 g. 1\"  Patient tolerated well: Stable and Follow up appointment scheduled    Dr. Rahul Gamino is supervising clinician today.     Patient refusing vital signs, Dr Burke Valera aware.      Manju , RN  06/20/20 [id number]

## (undated) DEVICE — GLOVE ORANGE PI 7 1/2   MSG9075

## (undated) DEVICE — TOTAL TRAY, 16FR 10ML SIL FOLEY, URN: Brand: MEDLINE

## (undated) DEVICE — BLADE ES ELASTOMERIC COAT INSUL DURABLE BEND UPTO 90DEG

## (undated) DEVICE — DISPOSABLE IRRIGATION BIPOLAR CORD, M1000 TYPE: Brand: KIRWAN

## (undated) DEVICE — GOWN,AURORA,NONREINFORCED,LARGE: Brand: MEDLINE

## (undated) DEVICE — CATHETER,URETHRAL,REDRUBBER,STRL,14FR: Brand: MEDLINE INDUSTRIES, INC.

## (undated) DEVICE — ACCUDRAIN® EXTERNAL CSF DRAINAGE SYSTEM WITH ANTI-REFLUX VALVE: Brand: ACCUDRAIN®

## (undated) DEVICE — ELECTRODE ES L2.75IN S STL INSUL BLDE W/ SL EDGE

## (undated) DEVICE — CODMAN® SURGICAL PATTIES 1/2" X 1/2" (1.27CM X 1.27CM): Brand: CODMAN®

## (undated) DEVICE — Device

## (undated) DEVICE — BUR SURG OD9MM M S STL CUT ACORN N FLUT FOR TPS MIDAS REX

## (undated) DEVICE — SUTURE D SPEC VCRL 2 0 D8876

## (undated) DEVICE — ZINACTIVE USE 2539609 APPLICATOR MEDICATED 10.5 CC SOLUTION HI LT ORNG CHLORAPREP

## (undated) DEVICE — BATTERY PACK 2 FOR QUIKDRIVE: Brand: UNIVERSAL NEURO 2, QUIKDRIVE

## (undated) DEVICE — SVMMC CRANI PK

## (undated) DEVICE — BLADE CLP TAPR HD WET DRY CAPABILITY GTT IN CHARGING USE

## (undated) DEVICE — CODMAN® SURGICAL PATTIES 1/2" X1 1/2" (1.27CM X 3.81CM): Brand: CODMAN®

## (undated) DEVICE — MARKER,SKIN,WI/RULER AND LABELS: Brand: MEDLINE

## (undated) DEVICE — AGENT HEMSTAT W2XL4IN OXIDIZED REGENERATED CELOS ABSRB

## (undated) DEVICE — GARMENT,MEDLINE,DVT,INT,CALF,MED, GEN2: Brand: MEDLINE

## (undated) DEVICE — GAUZE,SPONGE,FLUFF,6"X6.75",STRL,5/TRAY: Brand: MEDLINE

## (undated) DEVICE — PAD,NON-ADHERENT,3X8,STERILE,LF,1/PK: Brand: MEDLINE

## (undated) DEVICE — E-Z CLEAN, NON-STICK, PTFE COATED, ELECTROSURGICAL BLADE ELECTRODE, MODIFIED EXTENDED INSULATION, 2.5 INCH (6.35 CM): Brand: MEGADYNE

## (undated) DEVICE — CONNECTOR TBNG WHT PLAS SUCT STR 5IN1 LTWT W/ M CONN

## (undated) DEVICE — SPONGE LAP W18XL18IN WHT COT 4 PLY FLD STRUNG RADPQ DISP ST

## (undated) DEVICE — ADHESIVE SKIN CLSR 0.7ML TOP DERMBND ADV

## (undated) DEVICE — CRANIOTOMY DRAPE, STERILE: Brand: MEDLINE

## (undated) DEVICE — CODMAN® SURGICAL PATTIES 1/2" X 3" (1.27CM X 7.62CM): Brand: CODMAN®